# Patient Record
Sex: FEMALE | Race: WHITE | NOT HISPANIC OR LATINO | Employment: OTHER | URBAN - METROPOLITAN AREA
[De-identification: names, ages, dates, MRNs, and addresses within clinical notes are randomized per-mention and may not be internally consistent; named-entity substitution may affect disease eponyms.]

---

## 2017-01-24 DIAGNOSIS — E78.5 DYSLIPIDEMIA: ICD-10-CM

## 2017-01-24 DIAGNOSIS — I10 ESSENTIAL HYPERTENSION: ICD-10-CM

## 2017-01-24 RX ORDER — CLOPIDOGREL BISULFATE 75 MG/1
75 TABLET ORAL DAILY
Qty: 90 TABLET | Refills: 4 | Status: SHIPPED | OUTPATIENT
Start: 2017-01-24 | End: 2018-02-05 | Stop reason: SDUPTHER

## 2017-01-24 RX ORDER — AMLODIPINE BESYLATE 10 MG/1
10 TABLET ORAL DAILY
Qty: 90 TABLET | Refills: 4 | Status: SHIPPED | OUTPATIENT
Start: 2017-01-24 | End: 2017-03-10

## 2017-03-07 DIAGNOSIS — I73.9 PAD (PERIPHERAL ARTERY DISEASE): Primary | ICD-10-CM

## 2017-03-10 ENCOUNTER — OFFICE VISIT (OUTPATIENT)
Dept: CARDIOLOGY | Facility: CLINIC | Age: 82
End: 2017-03-10
Payer: MEDICARE

## 2017-03-10 ENCOUNTER — CLINICAL SUPPORT (OUTPATIENT)
Dept: CARDIOLOGY | Facility: CLINIC | Age: 82
End: 2017-03-10
Payer: MEDICARE

## 2017-03-10 VITALS
OXYGEN SATURATION: 94 % | HEIGHT: 63 IN | DIASTOLIC BLOOD PRESSURE: 80 MMHG | WEIGHT: 161.81 LBS | HEART RATE: 58 BPM | SYSTOLIC BLOOD PRESSURE: 142 MMHG | BODY MASS INDEX: 28.67 KG/M2

## 2017-03-10 DIAGNOSIS — I70.219 ATHEROSCLEROTIC PERIPHERAL VASCULAR DISEASE WITH INTERMITTENT CLAUDICATION: ICD-10-CM

## 2017-03-10 DIAGNOSIS — I25.10 CORONARY ARTERY DISEASE INVOLVING NATIVE CORONARY ARTERY OF NATIVE HEART WITHOUT ANGINA PECTORIS: Primary | ICD-10-CM

## 2017-03-10 DIAGNOSIS — I27.0 PRIMARY PULMONARY HYPERTENSION: ICD-10-CM

## 2017-03-10 DIAGNOSIS — N18.30 CKD (CHRONIC KIDNEY DISEASE) STAGE 3, GFR 30-59 ML/MIN: ICD-10-CM

## 2017-03-10 DIAGNOSIS — E78.5 DYSLIPIDEMIA: ICD-10-CM

## 2017-03-10 DIAGNOSIS — I73.9 PAD (PERIPHERAL ARTERY DISEASE): ICD-10-CM

## 2017-03-10 DIAGNOSIS — I10 ESSENTIAL HYPERTENSION: ICD-10-CM

## 2017-03-10 DIAGNOSIS — I21.4 NSTEMI (NON-ST ELEVATED MYOCARDIAL INFARCTION): ICD-10-CM

## 2017-03-10 DIAGNOSIS — I38 HEART VALVE DISEASE: ICD-10-CM

## 2017-03-10 DIAGNOSIS — I25.2 OLD MI (MYOCARDIAL INFARCTION): ICD-10-CM

## 2017-03-10 DIAGNOSIS — E78.5 DYSLIPIDEMIA: Primary | ICD-10-CM

## 2017-03-10 PROCEDURE — 99499 UNLISTED E&M SERVICE: CPT | Mod: S$GLB,,, | Performed by: INTERNAL MEDICINE

## 2017-03-10 PROCEDURE — 93926 LOWER EXTREMITY STUDY: CPT | Mod: S$GLB,,, | Performed by: INTERNAL MEDICINE

## 2017-03-10 PROCEDURE — 99999 PR PBB SHADOW E&M-EST. PATIENT-LVL IV: CPT | Mod: PBBFAC,,, | Performed by: INTERNAL MEDICINE

## 2017-03-10 PROCEDURE — 1126F AMNT PAIN NOTED NONE PRSNT: CPT | Mod: S$GLB,,, | Performed by: INTERNAL MEDICINE

## 2017-03-10 PROCEDURE — 99213 OFFICE O/P EST LOW 20 MIN: CPT | Mod: S$GLB,,, | Performed by: INTERNAL MEDICINE

## 2017-03-10 PROCEDURE — 1157F ADVNC CARE PLAN IN RCRD: CPT | Mod: S$GLB,,, | Performed by: INTERNAL MEDICINE

## 2017-03-10 PROCEDURE — 1160F RVW MEDS BY RX/DR IN RCRD: CPT | Mod: S$GLB,,, | Performed by: INTERNAL MEDICINE

## 2017-03-10 PROCEDURE — 1159F MED LIST DOCD IN RCRD: CPT | Mod: S$GLB,,, | Performed by: INTERNAL MEDICINE

## 2017-03-10 RX ORDER — ATORVASTATIN CALCIUM 20 MG/1
20 TABLET, FILM COATED ORAL DAILY
COMMUNITY
End: 2017-03-10 | Stop reason: CLARIF

## 2017-03-10 RX ORDER — CAPTOPRIL 50 MG/1
50 TABLET ORAL DAILY
COMMUNITY
End: 2018-04-06

## 2017-03-10 RX ORDER — ATORVASTATIN CALCIUM 20 MG/1
80 TABLET, FILM COATED ORAL DAILY
COMMUNITY
End: 2017-03-10 | Stop reason: SDUPTHER

## 2017-03-10 RX ORDER — CARVEDILOL 12.5 MG/1
12.5 TABLET ORAL 2 TIMES DAILY WITH MEALS
COMMUNITY
End: 2017-07-07 | Stop reason: DRUGHIGH

## 2017-03-10 RX ORDER — SPIRONOLACTONE 25 MG/1
25 TABLET ORAL DAILY
COMMUNITY
End: 2017-11-10

## 2017-03-10 RX ORDER — ATORVASTATIN CALCIUM 20 MG/1
80 TABLET, FILM COATED ORAL DAILY
Qty: 360 TABLET | Refills: 4 | Status: SHIPPED | OUTPATIENT
Start: 2017-03-10

## 2017-03-10 RX ORDER — ESCITALOPRAM OXALATE 10 MG/1
10 TABLET ORAL DAILY
COMMUNITY
End: 2017-06-21 | Stop reason: SDUPTHER

## 2017-03-10 NOTE — PROGRESS NOTES
Subjective:   Patient ID:  Symone Lloyd is a 87 y.o. female who presents for follow up of Coronary Artery Disease (s/p stents); Peripheral Arterial Disease (s/p L. SFA DCB); Chronic Kidney Disease; Hyperlipidemia; and Hypertension      Assessment:     1. Coronary artery disease involving native coronary artery of native heart without angina pectoris: no angina or chf symptoms.    2. Atherosclerotic peripheral vascular disease with intermittent claudication : : s/p PTA-DCB to L.SFA : nl LAURIE today with patent SFA.   3. Dyslipidemia : not taking statins.   4. CKD (chronic kidney disease) stage 3, GFR 30-59 ml/min    5. Essential hypertension : controlled at home.   6. Primary pulmonary hypertension    7. 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 NSTEMI (non-ST elevated myocardial infarction)    9. Old MI (myocardial infarction)        Plan:     Restart Lipitor: 20mg qd x 2 months, 40 mg qd x 2 months, then 80 mg qd x 2 months and recheck FLP.  Psychology consult for grief management.  RTC 6 months.          HPI: Reports no angina or chf symptoms.  No claudication symptoms.  Her  of many years passed away in December and she is grieving.  Not taking statins.           Review of Systems   Constitution: Negative for diaphoresis.   Cardiovascular: Negative for chest pain, claudication, cyanosis, dyspnea on exertion, irregular heartbeat, leg swelling, near-syncope, orthopnea, palpitations, paroxysmal nocturnal dyspnea and syncope.   Respiratory: Negative for shortness of breath and wheezing.    Neurological: Negative for brief paralysis, dizziness and focal weakness.   Psychiatric/Behavioral: Positive for depression.   All other systems reviewed and are negative.      Past Medical History:   Diagnosis Date    Aortic valve disorders     Arthritis     Benign neoplasm of breast     Chest pain, unspecified     CHF (congestive heart failure)     Coronary artery disease     Diaphragmatic hernia without  mention of obstruction or gangrene     Duodenal ulcer, unspecified as acute or chronic, without hemorrhage, perforation, or obstruction     Gastric ulcer, unspecified as acute or chronic, without mention of hemorrhage, perforation, or obstruction     GERD (gastroesophageal reflux disease)     Heart attack     HLD (hyperlipidemia)     HTN (hypertension)     Memory loss     Microscopic hematuria     Old myocardial infarction     Osteoporosis, unspecified     Personal history of colonic polyps     Primary pulmonary hypertension     Rosacea     Stricture and stenosis of esophagus     Unspecified hypothyroidism     Volvulus        Past Surgical History:   Procedure Laterality Date    ANGIOPLASTY      APPENDECTOMY      Open    CATARACT EXTRACTION, BILATERAL      COLONOSCOPY W/ POLYPECTOMY      CORONARY ANGIOPLASTY      s/p stents    ESOPHAGOGASTRODUODENOSCOPY      TONSILLECTOMY, ADENOIDECTOMY      TOTAL ABDOMINAL HYSTERECTOMY      rso, endometriosis       Social History   Substance Use Topics    Smoking status: Never Smoker    Smokeless tobacco: Never Used    Alcohol use 0.6 oz/week     1 Glasses of wine per week      Comment: wine - nightly       Family History   Problem Relation Age of Onset    Heart failure Mother      d. 81    Heart attack Father      d. 76    Hypertension Father     Heart failure Father     Heart disease Father     Cancer Sister      breast    No Known Problems Maternal Grandmother     No Known Problems Maternal Grandfather     No Known Problems Paternal Grandmother     No Known Problems Paternal Grandfather     No Known Problems Brother     No Known Problems Maternal Aunt     No Known Problems Maternal Uncle     No Known Problems Paternal Aunt     No Known Problems Paternal Uncle     Anemia Neg Hx     Arrhythmia Neg Hx     Asthma Neg Hx     Clotting disorder Neg Hx     Fainting Neg Hx     Hyperlipidemia Neg Hx     Melanoma Neg Hx     Colon cancer Neg  "Hx     Cirrhosis Neg Hx     Celiac disease Neg Hx     Esophageal cancer Neg Hx     Stomach cancer Neg Hx     Ulcerative colitis Neg Hx     Crohn's disease Neg Hx     Irritable bowel syndrome Neg Hx     Liver cancer Neg Hx     Rectal cancer Neg Hx        Current Outpatient Prescriptions   Medication Sig    acetaminophen (TYLENOL) 80 MG Chew Take 500 mg by mouth as needed.    aspirin (ECOTRIN) 81 MG EC tablet Take 81 mg by mouth once daily.      captopril (CAPOTEN) 50 MG tablet Take 50 mg by mouth once daily.    carvedilol (COREG) 12.5 MG tablet Take 12.5 mg by mouth 2 (two) times daily with meals.    clopidogrel (PLAVIX) 75 mg tablet Take 1 tablet (75 mg total) by mouth once daily.    escitalopram oxalate (LEXAPRO) 10 MG tablet Take 10 mg by mouth once daily.    estrogens, conjugated, (PREMARIN) 0.3 MG tablet Take 1 tablet (0.3 mg total) by mouth every evening.    levothyroxine (SYNTHROID) 100 MCG tablet TAKE 1 TABLET BY MOUTH EVERY DAY    omeprazole (PRILOSEC) 20 MG capsule Take 1 capsule (20 mg total) by mouth 2 (two) times daily.    spironolactone (ALDACTONE) 25 MG tablet Take 25 mg by mouth once daily.    atorvastatin (LIPITOR) 20 MG tablet Take 4 tablets (80 mg total) by mouth once daily.    nitroGLYCERIN (NITROSTAT) 0.4 MG SL tablet Place 1 tablet (0.4 mg total) under the tongue every 5 (five) minutes as needed for Chest pain.     No current facility-administered medications for this visit.        Review of patient's allergies indicates:   Allergen Reactions    Sulfa (sulfonamide antibiotics) Anaphylaxis       Objective:     BP (!) 142/80 (BP Location: Left arm, Patient Position: Sitting, BP Method: Manual)  Pulse (!) 58  Ht 5' 3" (1.6 m)  Wt 73.4 kg (161 lb 13.1 oz)  SpO2 (!) 94%  BMI 28.66 kg/m2    Physical Exam   Constitutional: She is oriented to person, place, and time. She appears well-developed and well-nourished.   HENT:   Head: Normocephalic and atraumatic.   Eyes: Pupils are " equal, round, and reactive to light.   Neck: Normal range of motion. Neck supple. No thyromegaly present.   Cardiovascular: Normal rate, regular rhythm, S1 normal, S2 normal and intact distal pulses.    Murmur heard.   Early systolic murmur is present with a grade of 2/6   Pulses:       Carotid pulses are 2+ on the right side, and 2+ on the left side.       Radial pulses are 2+ on the right side, and 2+ on the left side.        Femoral pulses are 2+ on the right side, and 2+ on the left side.       Popliteal pulses are 2+ on the right side, and 2+ on the left side.        Dorsalis pedis pulses are 2+ on the right side, and 2+ on the left side.        Posterior tibial pulses are 2+ on the right side, and 2+ on the left side.   Pulmonary/Chest: Effort normal and breath sounds normal. No respiratory distress. She has no wheezes. She has no rales.   Abdominal: Soft. Bowel sounds are normal. She exhibits no distension. There is no hepatosplenomegaly. There is no tenderness.   Musculoskeletal: Normal range of motion. She exhibits no edema or tenderness.   Neurological: She is alert and oriented to person, place, and time. No cranial nerve deficit.   Skin: Skin is warm.   Psychiatric: She has a normal mood and affect. Her behavior is normal. Judgment and thought content normal.   Nursing note and vitals reviewed.        Chemistry        Component Value Date/Time     03/10/2017 0845    K 3.9 03/10/2017 0845     03/10/2017 0845    CO2 26 03/10/2017 0845    BUN 23 03/10/2017 0845    CREATININE 1.0 03/10/2017 0845    GLU 88 03/10/2017 0845        Component Value Date/Time    CALCIUM 9.2 03/10/2017 0845    ALKPHOS 78 12/15/2015 1538    AST 20 12/15/2015 1538    ALT 17 12/15/2015 1538    BILITOT 0.3 12/15/2015 1538            Lab Results   Component Value Date    CHOL 272 (H) 03/10/2017    CHOL 245 (H) 08/06/2015    CHOL 267 (H) 01/12/2015     Lab Results   Component Value Date    HDL 57 03/10/2017    HDL 59  08/06/2015    HDL 53 01/12/2015     Lab Results   Component Value Date    LDLCALC 194.0 (H) 03/10/2017    LDLCALC 158.4 08/06/2015    LDLCALC 185.8 (H) 01/12/2015     Lab Results   Component Value Date    TRIG 105 03/10/2017    TRIG 138 08/06/2015    TRIG 141 01/12/2015     Lab Results   Component Value Date    CHOLHDL 21.0 03/10/2017    CHOLHDL 24.1 08/06/2015    CHOLHDL 19.9 (L) 01/12/2015         Lab Results   Component Value Date     03/10/2017    K 3.9 03/10/2017     03/10/2017    CO2 26 03/10/2017    BUN 23 03/10/2017    CREATININE 1.0 03/10/2017    GLU 88 03/10/2017    HGBA1C 5.6 08/07/2015    MG 1.9 10/26/2015    AST 20 12/15/2015    ALT 17 12/15/2015    ALBUMIN 3.5 12/15/2015    PROT 6.7 12/15/2015    BILITOT 0.3 12/15/2015    WBC 8.59 12/15/2015    HGB 10.1 (L) 12/15/2015    HCT 32.6 (L) 12/15/2015    MCV 79 (L) 12/15/2015     (H) 12/15/2015    INR 0.9 10/09/2015    TSH 2.611 11/09/2015    CHOL 272 (H) 03/10/2017    HDL 57 03/10/2017    LDLCALC 194.0 (H) 03/10/2017    TRIG 105 03/10/2017

## 2017-03-13 ENCOUNTER — TELEPHONE (OUTPATIENT)
Dept: PSYCHIATRY | Facility: CLINIC | Age: 82
End: 2017-03-13

## 2017-06-07 ENCOUNTER — HOSPITAL ENCOUNTER (EMERGENCY)
Facility: HOSPITAL | Age: 82
Discharge: HOME | End: 2017-06-07
Attending: EMERGENCY MEDICINE
Payer: MEDICARE

## 2017-06-07 VITALS
BODY MASS INDEX: 28.35 KG/M2 | OXYGEN SATURATION: 95 % | HEIGHT: 63 IN | RESPIRATION RATE: 16 BRPM | TEMPERATURE: 98 F | DIASTOLIC BLOOD PRESSURE: 67 MMHG | SYSTOLIC BLOOD PRESSURE: 149 MMHG | HEART RATE: 53 BPM | WEIGHT: 160 LBS

## 2017-06-07 DIAGNOSIS — H53.2 DIPLOPIA: ICD-10-CM

## 2017-06-07 DIAGNOSIS — M54.2 CERVICALGIA OF OCCIPITO-ATLANTO-AXIAL REGION: Primary | ICD-10-CM

## 2017-06-07 DIAGNOSIS — R42 DIZZINESS: ICD-10-CM

## 2017-06-07 LAB
BUN SERPL-MCNC: 28 MG/DL (ref 6–30)
CHLORIDE SERPL-SCNC: 105 MMOL/L (ref 95–110)
CREAT SERPL-MCNC: 0.9 MG/DL (ref 0.5–1.4)
GLUCOSE SERPL-MCNC: 106 MG/DL (ref 70–110)
HCT VFR BLD CALC: 40 %PCV (ref 36–54)
POC IONIZED CALCIUM: 1.16 MMOL/L (ref 1.06–1.42)
POC TCO2 (MEASURED): 25 MMOL/L (ref 23–29)
POTASSIUM BLD-SCNC: 3.7 MMOL/L (ref 3.5–5.1)
SAMPLE: NORMAL
SODIUM BLD-SCNC: 140 MMOL/L (ref 136–145)

## 2017-06-07 PROCEDURE — 99284 EMERGENCY DEPT VISIT MOD MDM: CPT | Mod: 25

## 2017-06-07 PROCEDURE — 93010 ELECTROCARDIOGRAM REPORT: CPT | Mod: S$GLB,,, | Performed by: INTERNAL MEDICINE

## 2017-06-07 PROCEDURE — 25000003 PHARM REV CODE 250: Performed by: EMERGENCY MEDICINE

## 2017-06-07 PROCEDURE — 93005 ELECTROCARDIOGRAM TRACING: CPT

## 2017-06-07 PROCEDURE — 99285 EMERGENCY DEPT VISIT HI MDM: CPT | Mod: ,,, | Performed by: EMERGENCY MEDICINE

## 2017-06-07 RX ORDER — ACETAMINOPHEN 500 MG
1000 TABLET ORAL
Status: COMPLETED | OUTPATIENT
Start: 2017-06-07 | End: 2017-06-07

## 2017-06-07 RX ORDER — PANTOPRAZOLE SODIUM 40 MG/1
40 TABLET, DELAYED RELEASE ORAL
Status: COMPLETED | OUTPATIENT
Start: 2017-06-07 | End: 2017-06-07

## 2017-06-07 RX ADMIN — ACETAMINOPHEN 1000 MG: 500 TABLET ORAL at 02:06

## 2017-06-07 RX ADMIN — PANTOPRAZOLE SODIUM 40 MG: 40 TABLET, DELAYED RELEASE ORAL at 02:06

## 2017-06-07 NOTE — ED NOTES
MRI called stating pt is scheduled to go for test at 6pm - pt informed - pt and family member requests to have MRI outpatient another day - Dr. Cash notified and states he will come talk to pt and family.

## 2017-06-07 NOTE — ED NOTES
Two patient identifiers checked and confirmed.    APPEARANCE: Resting comfortably in no acute distress. Patient has clean hair, skin and nails. Clothing is appropriate and properly fastened.  NEURO: Awake, alert, appropriate for age, and cooperative with a calm affect; pupils equal and round.  HEENT: Patient reports double vision and neck stiffness.   CARDIAC: Regular rate and rhythm. Trace edema noted to bilateral lower extremities.   RESPIRATORY: Airway is open and patent. Respirations are spontaneous on room air. Normal respiratory effort and rate noted.  GI/: Abdomen soft and non-distended. Patient is reported to void and stool appropriately for age.  NEUROVASCULAR: All extremities are warm and pink with +2 pulses and capillary refill less than 3 seconds.  MUSCULOSKELETAL: Moves all extremities well; no obvious deformities noted.  SKIN: Warm and dry, adequate turgor, mucus membranes moist and pink

## 2017-06-07 NOTE — ED TRIAGE NOTES
Patient presents to ER with complaints of double vision and neck pain that has been persistent for several months.

## 2017-06-07 NOTE — ED PROVIDER NOTES
Encounter Date: 6/7/2017    SCRIBE #1 NOTE: I, Sybil Yu, am scribing for, and in the presence of, Dr. Cash.       History     Chief Complaint   Patient presents with    Headache     neck pain x several days, radiates into head/head pain-headache began last night, dizziness (intermittent) today, denies chest pain, denies shortness of breath, alert and oriented x 3, denies weakness    Dizziness     Review of patient's allergies indicates:   Allergen Reactions    Sulfa (sulfonamide antibiotics) Anaphylaxis     Time seen by provider: 2:15 PM    This is a 87 y.o. female with a PMHx of pulmonary HTN, CAD, CHF, and MI and a PSHx of bilateral cataract extraction and angioplasty who presents with complaint of intermittent posterior neck pain radiating to the scalp, as well as diplopia when looking laterally x 1 month. Associated symptoms include neck stiffness with movement in the lateral direction. She denies difficulty ambulating, weakness in arms/hands, and fever. Symptoms have not worsened. Patient's son explains he was concerned so brought her in.       The history is provided by the patient, a relative and medical records.     Past Medical History:   Diagnosis Date    Aortic valve disorders     Arthritis     Benign neoplasm of breast     Chest pain, unspecified     CHF (congestive heart failure)     Coronary artery disease     Diaphragmatic hernia without mention of obstruction or gangrene     Duodenal ulcer, unspecified as acute or chronic, without hemorrhage, perforation, or obstruction     Gastric ulcer, unspecified as acute or chronic, without mention of hemorrhage, perforation, or obstruction     GERD (gastroesophageal reflux disease)     Heart attack     HLD (hyperlipidemia)     HTN (hypertension)     Memory loss     Microscopic hematuria     Old myocardial infarction     Osteoporosis, unspecified     Personal history of colonic polyps     Primary pulmonary hypertension     Rosacea      Stricture and stenosis of esophagus     Unspecified hypothyroidism     Volvulus      Past Surgical History:   Procedure Laterality Date    ANGIOPLASTY      APPENDECTOMY      Open    CATARACT EXTRACTION, BILATERAL      COLONOSCOPY W/ POLYPECTOMY      CORONARY ANGIOPLASTY      s/p stents    ESOPHAGOGASTRODUODENOSCOPY      TONSILLECTOMY, ADENOIDECTOMY      TOTAL ABDOMINAL HYSTERECTOMY      rso, endometriosis     Family History   Problem Relation Age of Onset    Heart failure Mother      d. 81    Heart attack Father      d. 76    Hypertension Father     Heart failure Father     Heart disease Father     Cancer Sister      breast    No Known Problems Maternal Grandmother     No Known Problems Maternal Grandfather     No Known Problems Paternal Grandmother     No Known Problems Paternal Grandfather     No Known Problems Brother     No Known Problems Maternal Aunt     No Known Problems Maternal Uncle     No Known Problems Paternal Aunt     No Known Problems Paternal Uncle     Anemia Neg Hx     Arrhythmia Neg Hx     Asthma Neg Hx     Clotting disorder Neg Hx     Fainting Neg Hx     Hyperlipidemia Neg Hx     Melanoma Neg Hx     Colon cancer Neg Hx     Cirrhosis Neg Hx     Celiac disease Neg Hx     Esophageal cancer Neg Hx     Stomach cancer Neg Hx     Ulcerative colitis Neg Hx     Crohn's disease Neg Hx     Irritable bowel syndrome Neg Hx     Liver cancer Neg Hx     Rectal cancer Neg Hx      Social History   Substance Use Topics    Smoking status: Never Smoker    Smokeless tobacco: Never Used    Alcohol use 0.6 oz/week     1 Glasses of wine per week      Comment: wine - nightly     Review of Systems   Constitutional: Negative for fever.   HENT: Negative for nosebleeds.    Eyes: Positive for visual disturbance (diplopia).   Respiratory: Negative for shortness of breath.    Cardiovascular: Negative for chest pain.   Gastrointestinal: Negative for vomiting.   Genitourinary:  Negative for hematuria.   Musculoskeletal: Positive for neck pain and neck stiffness.   Skin: Negative for rash.   Neurological: Negative for speech difficulty and weakness.       Physical Exam     Initial Vitals [06/07/17 1328]   BP Pulse Resp Temp SpO2   (!) 128/98 (!) 56 17 98.2 °F (36.8 °C) 98 %     Physical Exam    Nursing note and vitals reviewed.  Constitutional: She appears well-developed and well-nourished. No distress.   HENT:   Head: Normocephalic and atraumatic.   Mouth/Throat: Oropharynx is clear and moist.   Eyes:   Diplopia with leftward gaze   Neck:   Bilateral occipital and upper cervical muscular tenderness. No bony tenderness. Pain when I move her head passively to the left. No pain with flexion and extension of the neck.   Cardiovascular: Normal rate, regular rhythm and normal heart sounds.   No murmur heard.  Pulmonary/Chest: Breath sounds normal. She has no wheezes. She has no rhonchi. She has no rales.   Neurological: She is alert and oriented to person, place, and time. She has normal strength. Coordination normal.   Skin: Skin is warm and dry.         ED Course   Procedures  Labs Reviewed   ISTAT PROCEDURE     EKG Readings: (Independently Interpreted)   Sinus bradycardia with PAC's. No acute findings. Similar to 2015          Medical Decision Making:   History:   Old Medical Records: I decided to obtain old medical records.  Initial Assessment:   Patient is a 87 year old female with neck pain with history of same, going on for a few weeks. She describes also some dizziness, by which she means looking to the left and seeing two things. She denies spinning sensation or feeling unsteady. On exam, there is diplopia with leftward gaze. I am concerned for possible small stroke near the 6th nerve nucleus. I will MRI the brain to definitively answer.  Independently Interpreted Test(s):   I have ordered and independently interpreted EKG Reading(s) - see prior notes  Clinical Tests:   Lab Tests:  Ordered and Reviewed  Medical Tests: Ordered and Reviewed  ED Management:  Updates:  4:53 PM - Patient does not want to wait for her MRI. Diplopia has been present at least a month and has not worsened, so if it is a small stroke, no emergent/urgent intervention is required. She can follow this with her PCP. Her neck pain can be treated with OTC medication. Patient is discharged.            Scribe Attestation:   Scribe #1: I performed the above scribed service and the documentation accurately describes the services I performed. I attest to the accuracy of the note.    Attending Attestation:           Physician Attestation for Scribe:  Physician Attestation Statement for Scribe #1: I, Dr. Cash, reviewed documentation, as scribed by Sybil Yu in my presence, and it is both accurate and complete.                 ED Course     Clinical Impression:   The primary encounter diagnosis was Cervicalgia of [id number] region. Diagnoses of Dizziness and Diplopia were also pertinent to this visit.    Disposition:   Disposition: Discharged  Condition: Stable       Juan Jose Cash MD  06/11/17 [id number]

## 2017-06-07 NOTE — PROVIDER PROGRESS NOTES - EMERGENCY DEPT.
Encounter Date: 6/7/2017    ED Physician Progress Notes       SCRIBE NOTE: I, Karen Montes, am scribing for, and in the presence of,  Dr. Olvera .  Physician Statement: I, Dr. Olvera , personally performed the services described in this documentation as scribed by Karen Montes in my presence, and it is both accurate and complete.      EKG - STEMI Decision  Initial Reading: No STEMI present.

## 2017-06-21 ENCOUNTER — OFFICE VISIT (OUTPATIENT)
Dept: PSYCHIATRY | Facility: CLINIC | Age: 82
End: 2017-06-21
Payer: COMMERCIAL

## 2017-06-21 VITALS
SYSTOLIC BLOOD PRESSURE: 182 MMHG | HEIGHT: 63 IN | WEIGHT: 161 LBS | DIASTOLIC BLOOD PRESSURE: 85 MMHG | BODY MASS INDEX: 28.53 KG/M2 | HEART RATE: 63 BPM

## 2017-06-21 DIAGNOSIS — F43.21 GRIEF: ICD-10-CM

## 2017-06-21 DIAGNOSIS — F33.2 MAJOR DEPRESSIVE DISORDER, RECURRENT, SEVERE WITHOUT PSYCHOTIC FEATURES: Primary | ICD-10-CM

## 2017-06-21 PROCEDURE — 1157F ADVNC CARE PLAN IN RCRD: CPT | Mod: S$GLB,,, | Performed by: PSYCHIATRY & NEUROLOGY

## 2017-06-21 PROCEDURE — 1159F MED LIST DOCD IN RCRD: CPT | Mod: S$GLB,,, | Performed by: PSYCHIATRY & NEUROLOGY

## 2017-06-21 PROCEDURE — 99205 OFFICE O/P NEW HI 60 MIN: CPT | Mod: S$GLB,,, | Performed by: PSYCHIATRY & NEUROLOGY

## 2017-06-21 PROCEDURE — 99999 PR PBB SHADOW E&M-EST. PATIENT-LVL III: CPT | Mod: PBBFAC,,, | Performed by: PSYCHIATRY & NEUROLOGY

## 2017-06-21 PROCEDURE — 99499 UNLISTED E&M SERVICE: CPT | Mod: S$GLB,,, | Performed by: PSYCHIATRY & NEUROLOGY

## 2017-06-21 RX ORDER — ESCITALOPRAM OXALATE 10 MG/1
10 TABLET ORAL DAILY
Qty: 90 TABLET | Refills: 0 | Status: SHIPPED | OUTPATIENT
Start: 2017-06-21 | End: 2017-08-07 | Stop reason: SDUPTHER

## 2017-07-07 ENCOUNTER — LAB VISIT (OUTPATIENT)
Dept: LAB | Facility: HOSPITAL | Age: 82
End: 2017-07-07
Attending: INTERNAL MEDICINE
Payer: MEDICARE

## 2017-07-07 ENCOUNTER — OFFICE VISIT (OUTPATIENT)
Dept: CARDIOLOGY | Facility: CLINIC | Age: 82
End: 2017-07-07
Payer: MEDICARE

## 2017-07-07 VITALS
SYSTOLIC BLOOD PRESSURE: 158 MMHG | HEART RATE: 61 BPM | BODY MASS INDEX: 26.8 KG/M2 | WEIGHT: 151.25 LBS | OXYGEN SATURATION: 93 % | DIASTOLIC BLOOD PRESSURE: 60 MMHG | HEIGHT: 63 IN

## 2017-07-07 DIAGNOSIS — I10 ESSENTIAL HYPERTENSION: Primary | ICD-10-CM

## 2017-07-07 DIAGNOSIS — I27.0 PRIMARY PULMONARY HYPERTENSION: ICD-10-CM

## 2017-07-07 DIAGNOSIS — E78.5 DYSLIPIDEMIA: ICD-10-CM

## 2017-07-07 DIAGNOSIS — E78.5 DYSLIPIDEMIA: Primary | ICD-10-CM

## 2017-07-07 DIAGNOSIS — I70.219 ATHEROSCLEROTIC PERIPHERAL VASCULAR DISEASE WITH INTERMITTENT CLAUDICATION: ICD-10-CM

## 2017-07-07 DIAGNOSIS — I25.10 CORONARY ARTERY DISEASE INVOLVING NATIVE CORONARY ARTERY OF NATIVE HEART WITHOUT ANGINA PECTORIS: Primary | ICD-10-CM

## 2017-07-07 DIAGNOSIS — F32.A DEPRESSION, UNSPECIFIED DEPRESSION TYPE: ICD-10-CM

## 2017-07-07 DIAGNOSIS — G31.84 MCI (MILD COGNITIVE IMPAIRMENT): ICD-10-CM

## 2017-07-07 DIAGNOSIS — N18.30 CKD (CHRONIC KIDNEY DISEASE) STAGE 3, GFR 30-59 ML/MIN: ICD-10-CM

## 2017-07-07 DIAGNOSIS — I10 ESSENTIAL HYPERTENSION: ICD-10-CM

## 2017-07-07 DIAGNOSIS — I38 HEART VALVE DISEASE: ICD-10-CM

## 2017-07-07 LAB
ANION GAP SERPL CALC-SCNC: 10 MMOL/L
BUN SERPL-MCNC: 30 MG/DL
CALCIUM SERPL-MCNC: 9.5 MG/DL
CHLORIDE SERPL-SCNC: 108 MMOL/L
CHOLEST/HDLC SERPL: 3.3 {RATIO}
CO2 SERPL-SCNC: 23 MMOL/L
CREAT SERPL-MCNC: 1 MG/DL
EST. GFR  (AFRICAN AMERICAN): 58.1 ML/MIN/1.73 M^2
EST. GFR  (NON AFRICAN AMERICAN): 50.4 ML/MIN/1.73 M^2
GLUCOSE SERPL-MCNC: 99 MG/DL
HDL/CHOLESTEROL RATIO: 30.7 %
HDLC SERPL-MCNC: 192 MG/DL
HDLC SERPL-MCNC: 59 MG/DL
LDLC SERPL CALC-MCNC: 110 MG/DL
NONHDLC SERPL-MCNC: 133 MG/DL
POTASSIUM SERPL-SCNC: 4.4 MMOL/L
SODIUM SERPL-SCNC: 141 MMOL/L
TRIGL SERPL-MCNC: 115 MG/DL

## 2017-07-07 PROCEDURE — 1159F MED LIST DOCD IN RCRD: CPT | Mod: S$GLB,,, | Performed by: INTERNAL MEDICINE

## 2017-07-07 PROCEDURE — 99213 OFFICE O/P EST LOW 20 MIN: CPT | Mod: S$GLB,,, | Performed by: INTERNAL MEDICINE

## 2017-07-07 PROCEDURE — 1157F ADVNC CARE PLAN IN RCRD: CPT | Mod: S$GLB,,, | Performed by: INTERNAL MEDICINE

## 2017-07-07 PROCEDURE — 99999 PR PBB SHADOW E&M-EST. PATIENT-LVL V: CPT | Mod: PBBFAC,,, | Performed by: INTERNAL MEDICINE

## 2017-07-07 PROCEDURE — 1126F AMNT PAIN NOTED NONE PRSNT: CPT | Mod: S$GLB,,, | Performed by: INTERNAL MEDICINE

## 2017-07-07 PROCEDURE — 99499 UNLISTED E&M SERVICE: CPT | Mod: S$GLB,,, | Performed by: INTERNAL MEDICINE

## 2017-07-07 RX ORDER — CARVEDILOL 25 MG/1
25 TABLET ORAL 2 TIMES DAILY WITH MEALS
Status: ON HOLD | COMMUNITY
End: 2018-02-25

## 2017-07-07 RX ORDER — CARVEDILOL 25 MG/1
25 TABLET ORAL DAILY
COMMUNITY
End: 2017-07-07 | Stop reason: SDUPTHER

## 2017-07-07 RX ORDER — CARVEDILOL 25 MG/1
25 TABLET ORAL DAILY
Qty: 90 TABLET | Refills: 4 | Status: SHIPPED | OUTPATIENT
Start: 2017-07-07 | End: 2017-07-07 | Stop reason: ALTCHOICE

## 2017-07-07 NOTE — PROGRESS NOTES
Subjective:   Patient ID:  Symone Lloyd is a 88 y.o. female who presents for follow up of Coronary Artery Disease; Hyperlipidemia; Hypertension; and Peripheral Arterial Disease      Assessment:     1. Coronary artery disease involving native coronary artery of native heart without angina pectoris : no angina   2. Depression, unspecified depression type    3. MCI (mild cognitive impairment)    4. Primary pulmonary hypertension    5. Atherosclerotic peripheral vascular disease with intermittent claudication : : s/p PTA-DCB to L.SFA    6. Essential hypertension: poor control    7. 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 CKD (chronic kidney disease) stage 3, GFR 30-59 ml/min    9. Dyslipidemia : marked improvement in LDL with statin       Plan:   Increase carvedilol to 25 mg bid for htn control  Discussed using SL NTG for severe htn.  Discussed increasing activity.  Continue diet and lipitor for LDL.  RTC 6 months with FLP        HPI: Has noted increase in BP episodically (> 200's).  She denies angina or heart failure and has no claudication.       Review of Systems   Constitution: Negative for diaphoresis.   Cardiovascular: Negative for chest pain, claudication, cyanosis, dyspnea on exertion, irregular heartbeat, leg swelling, near-syncope, orthopnea and palpitations.   Respiratory: Negative for shortness of breath and wheezing.    Psychiatric/Behavioral: Positive for depression.   All other systems reviewed and are negative.      Past Medical History:   Diagnosis Date    Aortic valve disorders     Arthritis     Benign neoplasm of breast     Chest pain, unspecified     CHF (congestive heart failure)     Coronary artery disease     Depression     Diaphragmatic hernia without mention of obstruction or gangrene     Duodenal ulcer, unspecified as acute or chronic, without hemorrhage, perforation, or obstruction     Gastric ulcer, unspecified as acute or chronic, without mention of hemorrhage,  perforation, or obstruction     GERD (gastroesophageal reflux disease)     Headache     Heart attack     History of migraine     HLD (hyperlipidemia)     HTN (hypertension)     Memory loss     Microscopic hematuria     Old myocardial infarction     Osteoporosis, unspecified     Personal history of colonic polyps     Primary pulmonary hypertension     Psychiatric problem     PTSD (post-traumatic stress disorder)     Rosacea     Stricture and stenosis of esophagus     Therapy     Unspecified hypothyroidism     Volvulus        Past Surgical History:   Procedure Laterality Date    ANGIOPLASTY      APPENDECTOMY      Open    CATARACT EXTRACTION, BILATERAL      COLONOSCOPY W/ POLYPECTOMY      CORONARY ANGIOPLASTY      s/p stents    ESOPHAGOGASTRODUODENOSCOPY      TONSILLECTOMY, ADENOIDECTOMY      TOTAL ABDOMINAL HYSTERECTOMY      rso, endometriosis       Social History   Substance Use Topics    Smoking status: Never Smoker    Smokeless tobacco: Never Used    Alcohol use 0.6 oz/week     1 Glasses of wine per week      Comment: wine - nightly       Family History   Problem Relation Age of Onset    Heart failure Mother      d. 81    Heart attack Father      d. 76    Hypertension Father     Heart failure Father     Heart disease Father     Cancer Sister      breast    No Known Problems Maternal Grandmother     No Known Problems Maternal Grandfather     No Known Problems Paternal Grandmother     No Known Problems Paternal Grandfather     No Known Problems Brother     No Known Problems Maternal Aunt     No Known Problems Maternal Uncle     No Known Problems Paternal Aunt     No Known Problems Paternal Uncle     Anemia Neg Hx     Arrhythmia Neg Hx     Asthma Neg Hx     Clotting disorder Neg Hx     Fainting Neg Hx     Hyperlipidemia Neg Hx     Melanoma Neg Hx     Colon cancer Neg Hx     Cirrhosis Neg Hx     Celiac disease Neg Hx     Esophageal cancer Neg Hx     Stomach  "cancer Neg Hx     Ulcerative colitis Neg Hx     Crohn's disease Neg Hx     Irritable bowel syndrome Neg Hx     Liver cancer Neg Hx     Rectal cancer Neg Hx     Dementia Neg Hx     Depression Neg Hx     Schizophrenia Neg Hx     Alcohol abuse Neg Hx     Drug abuse Neg Hx     Suicide Neg Hx        Current Outpatient Prescriptions   Medication Sig    atorvastatin (LIPITOR) 20 MG tablet Take 4 tablets (80 mg total) by mouth once daily.    captopril (CAPOTEN) 50 MG tablet Take 50 mg by mouth once daily.    clopidogrel (PLAVIX) 75 mg tablet Take 1 tablet (75 mg total) by mouth once daily.    escitalopram oxalate (LEXAPRO) 10 MG tablet Take 1 tablet (10 mg total) by mouth once daily.    estrogens, conjugated, (PREMARIN) 0.3 MG tablet Take 1 tablet (0.3 mg total) by mouth every evening.    folic acid-vit B6-vit B12 2.5-25-2 mg (FOLBIC OR EQUIV) 2.5-25-2 mg Tab Take 1 tablet by mouth once daily.    levothyroxine (SYNTHROID) 100 MCG tablet TAKE 1 TABLET BY MOUTH EVERY DAY    omeprazole (PRILOSEC) 20 MG capsule Take 1 capsule (20 mg total) by mouth 2 (two) times daily.    spironolactone (ALDACTONE) 25 MG tablet Take 25 mg by mouth once daily.    acetaminophen (TYLENOL) 80 MG Chew Take 500 mg by mouth as needed.    aspirin (ECOTRIN) 81 MG EC tablet Take 81 mg by mouth once daily.      carvedilol (COREG) 25 MG tablet Take 1 tablet (25 mg total) by mouth once daily at 6am.    nitroGLYCERIN (NITROSTAT) 0.4 MG SL tablet Place 1 tablet (0.4 mg total) under the tongue every 5 (five) minutes as needed for Chest pain.     No current facility-administered medications for this visit.        Review of patient's allergies indicates:   Allergen Reactions    Sulfa (sulfonamide antibiotics) Anaphylaxis       Objective:     BP (!) 158/60 (BP Location: Right arm, Patient Position: Sitting, BP Method: Automatic)   Pulse 61   Ht 5' 3" (1.6 m)   Wt 68.6 kg (151 lb 3.8 oz)   SpO2 (!) 93%   BMI 26.79 kg/m²     Physical " Exam   Constitutional: She is oriented to person, place, and time. She appears well-developed and well-nourished.   HENT:   Head: Normocephalic and atraumatic.   Eyes: Pupils are equal, round, and reactive to light.   Neck: Normal range of motion. Neck supple. No thyromegaly present.   Cardiovascular: Normal rate, regular rhythm, S1 normal, S2 normal, normal heart sounds and intact distal pulses.    No murmur heard.  Pulses:       Carotid pulses are 2+ on the right side, and 2+ on the left side.       Radial pulses are 2+ on the right side, and 2+ on the left side.        Femoral pulses are 2+ on the right side, and 2+ on the left side.       Popliteal pulses are 2+ on the right side, and 2+ on the left side.        Dorsalis pedis pulses are 2+ on the right side, and 2+ on the left side.        Posterior tibial pulses are 2+ on the right side, and 2+ on the left side.   Pulmonary/Chest: Effort normal and breath sounds normal. No respiratory distress. She has no wheezes. She has no rales.   Abdominal: Soft. Bowel sounds are normal. She exhibits no distension. There is no hepatosplenomegaly. There is no tenderness.   Musculoskeletal: Normal range of motion. She exhibits no edema or tenderness.   Neurological: She is alert and oriented to person, place, and time. No cranial nerve deficit.   Skin: Skin is warm.   Psychiatric: She has a normal mood and affect. Her behavior is normal. Judgment and thought content normal.   Nursing note and vitals reviewed.        Chemistry        Component Value Date/Time     07/07/2017 0753    K 4.4 07/07/2017 0753     07/07/2017 0753    CO2 23 07/07/2017 0753    BUN 30 (H) 07/07/2017 0753    CREATININE 1.0 07/07/2017 0753    GLU 99 07/07/2017 0753        Component Value Date/Time    CALCIUM 9.5 07/07/2017 0753    ALKPHOS 78 12/15/2015 1538    AST 20 12/15/2015 1538    ALT 17 12/15/2015 1538    BILITOT 0.3 12/15/2015 1538    ESTGFRAFRICA 58.1 (A) 07/07/2017 0753    EGFRNONAA  50.4 (A) 07/07/2017 0753            Lab Results   Component Value Date    CHOL 192 07/07/2017    CHOL 272 (H) 03/10/2017    CHOL 245 (H) 08/06/2015     Lab Results   Component Value Date    HDL 59 07/07/2017    HDL 57 03/10/2017    HDL 59 08/06/2015     Lab Results   Component Value Date    LDLCALC 110.0 07/07/2017    LDLCALC 194.0 (H) 03/10/2017    LDLCALC 158.4 08/06/2015     Lab Results   Component Value Date    TRIG 115 07/07/2017    TRIG 105 03/10/2017    TRIG 138 08/06/2015     Lab Results   Component Value Date    CHOLHDL 30.7 07/07/2017    CHOLHDL 21.0 03/10/2017    CHOLHDL 24.1 08/06/2015         Lab Results   Component Value Date     07/07/2017    K 4.4 07/07/2017     07/07/2017    CO2 23 07/07/2017    BUN 30 (H) 07/07/2017    CREATININE 1.0 07/07/2017    GLU 99 07/07/2017    HGBA1C 5.6 08/07/2015    MG 1.9 10/26/2015    AST 20 12/15/2015    ALT 17 12/15/2015    ALBUMIN 3.5 12/15/2015    PROT 6.7 12/15/2015    BILITOT 0.3 12/15/2015    WBC 8.59 12/15/2015    HGB 10.1 (L) 12/15/2015    HCT 40 06/07/2017    MCV 79 (L) 12/15/2015     (H) 12/15/2015    INR 0.9 10/09/2015    TSH 2.611 11/09/2015    CHOL 192 07/07/2017    HDL 59 07/07/2017    LDLCALC 110.0 07/07/2017    TRIG 115 07/07/2017

## 2017-08-07 ENCOUNTER — OFFICE VISIT (OUTPATIENT)
Dept: PSYCHIATRY | Facility: CLINIC | Age: 82
End: 2017-08-07
Payer: MEDICARE

## 2017-08-07 VITALS
DIASTOLIC BLOOD PRESSURE: 75 MMHG | HEART RATE: 65 BPM | SYSTOLIC BLOOD PRESSURE: 185 MMHG | BODY MASS INDEX: 28.53 KG/M2 | WEIGHT: 161 LBS | HEIGHT: 63 IN

## 2017-08-07 DIAGNOSIS — F33.41 MAJOR DEPRESSIVE DISORDER, RECURRENT EPISODE, IN PARTIAL REMISSION: ICD-10-CM

## 2017-08-07 DIAGNOSIS — F43.21 GRIEF: Primary | ICD-10-CM

## 2017-08-07 PROCEDURE — 99213 OFFICE O/P EST LOW 20 MIN: CPT | Mod: S$GLB,,, | Performed by: PSYCHIATRY & NEUROLOGY

## 2017-08-07 PROCEDURE — 1157F ADVNC CARE PLAN IN RCRD: CPT | Mod: S$GLB,,, | Performed by: PSYCHIATRY & NEUROLOGY

## 2017-08-07 PROCEDURE — 90833 PSYTX W PT W E/M 30 MIN: CPT | Mod: S$GLB,,, | Performed by: PSYCHIATRY & NEUROLOGY

## 2017-08-07 PROCEDURE — 99499 UNLISTED E&M SERVICE: CPT | Mod: S$GLB,,, | Performed by: PSYCHIATRY & NEUROLOGY

## 2017-08-07 PROCEDURE — 3008F BODY MASS INDEX DOCD: CPT | Mod: S$GLB,,, | Performed by: PSYCHIATRY & NEUROLOGY

## 2017-08-07 PROCEDURE — 1159F MED LIST DOCD IN RCRD: CPT | Mod: S$GLB,,, | Performed by: PSYCHIATRY & NEUROLOGY

## 2017-08-07 PROCEDURE — 99999 PR PBB SHADOW E&M-EST. PATIENT-LVL III: CPT | Mod: PBBFAC,,, | Performed by: PSYCHIATRY & NEUROLOGY

## 2017-08-07 RX ORDER — ESCITALOPRAM OXALATE 20 MG/1
20 TABLET ORAL DAILY
Qty: 90 TABLET | Refills: 1 | Status: SHIPPED | OUTPATIENT
Start: 2017-08-07 | End: 2017-11-10 | Stop reason: DRUGHIGH

## 2017-08-08 DIAGNOSIS — E03.9 HYPOTHYROIDISM: ICD-10-CM

## 2017-08-08 RX ORDER — LEVOTHYROXINE SODIUM 100 UG/1
100 TABLET ORAL DAILY
Qty: 90 TABLET | Refills: 0 | Status: SHIPPED | OUTPATIENT
Start: 2017-08-08 | End: 2017-10-30 | Stop reason: SDUPTHER
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
## 2017-08-21 ENCOUNTER — TELEPHONE (OUTPATIENT)
Dept: CARDIOLOGY | Facility: CLINIC | Age: 82
End: 2017-08-21

## 2017-08-21 DIAGNOSIS — I10 ESSENTIAL HYPERTENSION: Primary | ICD-10-CM

## 2017-08-21 RX ORDER — AMLODIPINE BESYLATE 5 MG/1
5 TABLET ORAL DAILY
Qty: 30 TABLET | Refills: 12 | Status: SHIPPED | OUTPATIENT
Start: 2017-08-21 | End: 2018-04-06

## 2017-08-21 RX ORDER — AMLODIPINE BESYLATE 5 MG/1
5 TABLET ORAL DAILY
COMMUNITY
End: 2017-08-21 | Stop reason: SDUPTHER

## 2017-08-21 NOTE — TELEPHONE ENCOUNTER
Patient's son Dr. Lloyd called stating that his mother's blood pressure remains 170's/70's with HR 49-55. He started his mother on Lasix 20mg PO Qday. Dr. Sung reviewed and orders given for patient to start Amlodipine 5mg PO Qday. I called the son and gave him these instructions. Also told him to keep a blood pressure log and to call me next week with the readings. Verbalized understanding.

## 2017-10-30 DIAGNOSIS — E03.9 HYPOTHYROIDISM: ICD-10-CM

## 2017-10-30 RX ORDER — LEVOTHYROXINE SODIUM 100 UG/1
TABLET ORAL
Qty: 90 TABLET | Refills: 0 | Status: SHIPPED | OUTPATIENT
Start: 2017-10-30 | End: 2017-10-31 | Stop reason: SDUPTHER

## 2017-10-31 ENCOUNTER — LAB VISIT (OUTPATIENT)
Dept: LAB | Facility: HOSPITAL | Age: 82
End: 2017-10-31
Attending: INTERNAL MEDICINE
Payer: MEDICARE

## 2017-10-31 ENCOUNTER — OFFICE VISIT (OUTPATIENT)
Dept: INTERNAL MEDICINE | Facility: CLINIC | Age: 82
End: 2017-10-31
Payer: MEDICARE

## 2017-10-31 VITALS
HEIGHT: 61 IN | BODY MASS INDEX: 31.72 KG/M2 | RESPIRATION RATE: 16 BRPM | OXYGEN SATURATION: 90 % | TEMPERATURE: 98 F | HEART RATE: 67 BPM | SYSTOLIC BLOOD PRESSURE: 144 MMHG | DIASTOLIC BLOOD PRESSURE: 70 MMHG | WEIGHT: 168 LBS

## 2017-10-31 DIAGNOSIS — Z12.31 ENCOUNTER FOR SCREENING MAMMOGRAM FOR BREAST CANCER: ICD-10-CM

## 2017-10-31 DIAGNOSIS — I10 ESSENTIAL HYPERTENSION: ICD-10-CM

## 2017-10-31 DIAGNOSIS — Z00.00 ANNUAL PHYSICAL EXAM: ICD-10-CM

## 2017-10-31 DIAGNOSIS — Z00.00 ANNUAL PHYSICAL EXAM: Primary | ICD-10-CM

## 2017-10-31 DIAGNOSIS — E78.5 DYSLIPIDEMIA: ICD-10-CM

## 2017-10-31 DIAGNOSIS — N18.30 CKD (CHRONIC KIDNEY DISEASE) STAGE 3, GFR 30-59 ML/MIN: ICD-10-CM

## 2017-10-31 DIAGNOSIS — Z23 NEED FOR 23-POLYVALENT PNEUMOCOCCAL POLYSACCHARIDE VACCINE: ICD-10-CM

## 2017-10-31 DIAGNOSIS — E03.9 HYPOTHYROIDISM, UNSPECIFIED TYPE: ICD-10-CM

## 2017-10-31 DIAGNOSIS — I70.219 ATHEROSCLEROTIC PERIPHERAL VASCULAR DISEASE WITH INTERMITTENT CLAUDICATION: ICD-10-CM

## 2017-10-31 DIAGNOSIS — I27.0 PRIMARY PULMONARY HYPERTENSION: ICD-10-CM

## 2017-10-31 DIAGNOSIS — Z78.0 POSTMENOPAUSAL ESTROGEN DEFICIENCY: ICD-10-CM

## 2017-10-31 LAB
BASOPHILS # BLD AUTO: 0.05 K/UL
BASOPHILS NFR BLD: 0.4 %
DIFFERENTIAL METHOD: ABNORMAL
EOSINOPHIL # BLD AUTO: 0.2 K/UL
EOSINOPHIL NFR BLD: 1.3 %
ERYTHROCYTE [DISTWIDTH] IN BLOOD BY AUTOMATED COUNT: 17.2 %
ESTIMATED AVG GLUCOSE: 111 MG/DL
HBA1C MFR BLD HPLC: 5.5 %
HCT VFR BLD AUTO: 38.9 %
HGB BLD-MCNC: 12.6 G/DL
IMM GRANULOCYTES # BLD AUTO: 0.05 K/UL
IMM GRANULOCYTES NFR BLD AUTO: 0.4 %
IRON SERPL-MCNC: 56 UG/DL
LYMPHOCYTES # BLD AUTO: 6.2 K/UL
LYMPHOCYTES NFR BLD: 43.7 %
MCH RBC QN AUTO: 27.6 PG
MCHC RBC AUTO-ENTMCNC: 32.4 G/DL
MCV RBC AUTO: 85 FL
MONOCYTES # BLD AUTO: 0.9 K/UL
MONOCYTES NFR BLD: 6.7 %
NEUTROPHILS # BLD AUTO: 6.7 K/UL
NEUTROPHILS NFR BLD: 47.5 %
NRBC BLD-RTO: 0 /100 WBC
PLATELET # BLD AUTO: 345 K/UL
PMV BLD AUTO: 10.2 FL
RBC # BLD AUTO: 4.56 M/UL
SATURATED IRON: 12 %
TOTAL IRON BINDING CAPACITY: 485 UG/DL
TRANSFERRIN SERPL-MCNC: 328 MG/DL
TSH SERPL DL<=0.005 MIU/L-ACNC: 2.89 UIU/ML
WBC # BLD AUTO: 14.11 K/UL

## 2017-10-31 PROCEDURE — 36415 COLL VENOUS BLD VENIPUNCTURE: CPT | Mod: PO

## 2017-10-31 PROCEDURE — 84443 ASSAY THYROID STIM HORMONE: CPT

## 2017-10-31 PROCEDURE — 99397 PER PM REEVAL EST PAT 65+ YR: CPT | Mod: S$GLB,,, | Performed by: INTERNAL MEDICINE

## 2017-10-31 PROCEDURE — 99999 PR PBB SHADOW E&M-EST. PATIENT-LVL IV: CPT | Mod: PBBFAC,,, | Performed by: INTERNAL MEDICINE

## 2017-10-31 PROCEDURE — 83036 HEMOGLOBIN GLYCOSYLATED A1C: CPT

## 2017-10-31 PROCEDURE — 85025 COMPLETE CBC W/AUTO DIFF WBC: CPT

## 2017-10-31 PROCEDURE — 99499 UNLISTED E&M SERVICE: CPT | Mod: S$GLB,,, | Performed by: INTERNAL MEDICINE

## 2017-10-31 PROCEDURE — 90732 PPSV23 VACC 2 YRS+ SUBQ/IM: CPT | Mod: S$GLB,,, | Performed by: INTERNAL MEDICINE

## 2017-10-31 PROCEDURE — 83540 ASSAY OF IRON: CPT

## 2017-10-31 PROCEDURE — G0009 ADMIN PNEUMOCOCCAL VACCINE: HCPCS | Mod: S$GLB,,, | Performed by: INTERNAL MEDICINE

## 2017-10-31 RX ORDER — LEVOTHYROXINE SODIUM 100 UG/1
TABLET ORAL
Qty: 90 TABLET | Refills: 2 | Status: SHIPPED | OUTPATIENT
Start: 2017-10-31 | End: 2018-11-05 | Stop reason: SDUPTHER

## 2017-10-31 RX ORDER — ESCITALOPRAM OXALATE 10 MG/1
TABLET ORAL
Refills: 0 | COMMUNITY
Start: 2017-09-09

## 2017-10-31 NOTE — PROGRESS NOTES
"CC: Annual PE    89 yo female presents for PE  Nonsmoker  Social ETOH , wine only  FHX: + CAD and HTN    MEDS:  reviewed.     SCREENING TESTS:   Chol/lab:pending  C-scope:12/2012, Dr. Mcgarry, + hemorrhoids   denies blood in stool  EGD:4/2013, Dr. Toney    Benign-appearing esophageal stricture. Dilated.                            - Normal stomach.                                                            - Normal examined duodenum.   WWE: TAHRSO, has gyn  Mammo: pending  DEXA: 7/2014======UPDATE PENDING  Osteopenia of the femoral neck. Adult scoliosis associated with increased lumbar spine BMD.  FRAX calculation does not support treatment for osteoporosis.  Recommendations:  1) Adequate calcium and Vitamin D therapy  2) Appropriate exercise  3) Consider repeat BMD in 2- 4 years    Eye exam: UTD  DDS exam: UTD    VACCINATIONS:   FLU, yearly  Tetanus 06/2006.   Pneumovax 11/2007., 2017   Prevnar, 1/2015  Zostavax, 1/2014    REVIEW OF SYSTEMS:   Constitutional: No fever, chills, or night sweats.   Very, very tired; ++ cold intolerance- improved  Hx insomnia- sleeping very well  EYES: No double vision or d/c.   ENT: No ear pain or pressure, no sinus pain or pressure.    CV: No chest pain, shortness of breath, PND, orthopnea.  + claudication left leg w/ walking, several blocks  RESP: No cough or wheezing.   GI: no nausea, vomiting, or diarrhea    no constipation   : Not having any difficulty urinating, dysuria, or hematuria.   + nocturia x 2-3   MS: no stiffness, or weakness; joint swelling hands    + hip pain s/p left hip injection w/ relief- last o/v, thinks hip doing better, maybe 2/2 to decreased activity  NEURO: No HA or dizziness    No focal deficits or stroke like sx.   SKIN: No rashes or lesions.   ADL"S: no longer drives,   does all cooking, has maid, washes clothes and cleans her house   pays own bills w/ son and takes care of meds  Memory: Mild loss, delayed recall  Mental Health: spouse d. prefers to live " in  instead of [place]  AD's: + will, + living will, and + POA  Nutrition: Good  Gait: no falls  Safety: Intact  Urinary incontinence: +wears a pad, day and night, night worse  ROR negative except as previously noted      PAST MEDICAL HISTORY:   Hypertension of 30+ years,   Pulmonary heart disease,   Thyroid disease 20+ years,   MI in 2000, status post 2 stents   Visual disturbance, cataracts.   Osteoporosis,   Joint pain, s/p inj July 2009, injected w/ Supartz.   Breast lesion that was benign    PHYSICAL EXAM:    VSS:   GENERAL: She is alert and oriented, in no acute distress. She is well   developed, well nourished, conversant, and cooperative.   Pleasant as always  EYES: Conjunctivae and lids unremarkable. Sclerae anicteric.  Rims of the lid pink. Pupils reactive.   ENT: Hearing intact. Canals with slight cerumen.   TM visualized unremarkable.   Nasal mucosa, turbinates, and oropharynx   Unremarkable. Frenulum pink. Sinuses nontender.   NECK: Supple. No thyromegaly or lymphadenopathy noted.   RESPIRATORY: Efforts unlabored.   LUNGS: Clear to auscultation.   HEART: Regular rate and rhythm. No carotid bruits noted,   1+ pedal pulse. No edema.   ABDOMEN: Bowel sounds present, soft, nontender.  No hepatosplenomegaly noted.   MUSCULOSKELETAL: Gait normal. No clubbing, cyanosis, or edema.   NEURO: JAIME. No tremor noted.   SKIN:Warm and dry    IMPRESSION:   Annual PE  FHx: MI  FHx: Breast cancer  Old MI, asx  HTN, mildly elevated- pt son MD will check BP @ home  Primary pulmonary HTN, asx  HLD, asx  CKD III, asx  Hypothyroidism, stable by hx  Heart valve disorders-mod TR and mild MR and mild AR, stable by hx  PVD, s/p stent  Anemia, asx    PLAN:  Lab-\Urine  Pneumovax  Mammo  DEXA  Continue present meds  Call prn  RTC 3 mos BP check

## 2017-11-03 ENCOUNTER — HOSPITAL ENCOUNTER (OUTPATIENT)
Dept: RADIOLOGY | Facility: HOSPITAL | Age: 82
Discharge: HOME | End: 2017-11-03
Attending: INTERNAL MEDICINE
Payer: MEDICARE

## 2017-11-03 DIAGNOSIS — Z12.31 ENCOUNTER FOR SCREENING MAMMOGRAM FOR BREAST CANCER: ICD-10-CM

## 2017-11-03 PROCEDURE — 77067 SCR MAMMO BI INCL CAD: CPT | Mod: 26,,, | Performed by: RADIOLOGY

## 2017-11-03 PROCEDURE — 77067 SCR MAMMO BI INCL CAD: CPT | Mod: TC

## 2017-11-05 DIAGNOSIS — F33.41 MAJOR DEPRESSIVE DISORDER, RECURRENT EPISODE, IN PARTIAL REMISSION: ICD-10-CM

## 2017-11-05 DIAGNOSIS — F43.21 GRIEF: ICD-10-CM

## 2017-11-06 ENCOUNTER — TELEPHONE (OUTPATIENT)
Dept: INTERNAL MEDICINE | Facility: CLINIC | Age: 82
End: 2017-11-06

## 2017-11-06 NOTE — TELEPHONE ENCOUNTER
----- Message from Shadia Mcdonald MD sent at 11/4/2017  4:51 PM CDT -----  Ms. Lloyd, please note that your thyroid test was in the normal range, as was your 3 month average blood sugar test.  Your anemia has improved, although your iron studies still reveal the need to keep iron rich foods and supplements in your daily diet. Your white blood cell count was elevated, with an increased count of lymphocytes that can be seen with colds or viruses.  Your urine reported some blood on the chemical dip stick but when counted the RBC's ( red blood cells) were in the normal range.  Please let me know if you develop a fever or chills, burning when you urinate, chest congestion, or any new infectious symptom.  Shadia Woodard

## 2017-11-07 DIAGNOSIS — R06.02 SOB (SHORTNESS OF BREATH): Primary | ICD-10-CM

## 2017-11-09 NOTE — PROGRESS NOTES
Subjective:   Patient ID:  Symone Lloyd is a 88 y.o. female who presents for follow up of Coronary Artery Disease; Peripheral Arterial Disease (L SFA DCB); Hyperlipidemia; and Hypertension      Assessment:     1. Coronary artery disease involving native coronary artery of native heart without angina pectoris: s/p BMS to D1 and LCx 2001; 2.25 SKYLAR to mid LAD 2015. Nl EF by echo today   2. MCI (mild cognitive impairment)    3. Primary pulmonary hypertension : 2015 PA systolic 48mmHg, now 66 mmHg with TR   4. Atherosclerotic peripheral vascular disease with intermittent claudication : : s/p PTA-DCB to L.SFA: patent L. SFA by US     5. Dyslipidemia : nl HDL, LDL 94 on 40mg Lipitor   6. Essential hypertension : well controlled   7. 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 CKD (chronic kidney disease) stage 3, GFR 30-59 ml/min : stable       Plan:   Cont meds.  Reassurance.  RTC 1 yr.         HPI: Feels well.  Denies chest discomfort or claudication.  Is able to perform her normal daily activities without LOYOLA.  Denies ankle swelling.  She feels well and enjoys her independence.      2D ECHO: CONCLUSIONS     1 - Normal left ventricular systolic function (EF 55-60%). The inferior septum is mildly hypokinetic    2 - Impaired LV relaxation, elevated LAP (grade 2 diastolic dysfunction).     3 - Normal right ventricular systolic function .     4 - Pulmonary hypertension. The estimated PA systolic pressure is 66 mmHg.     5 - Mild mitral regurgitation.     6 - Moderate to severe tricuspid regurgitation. Tricuspid annulus is dilated.     7 - Biatrial enlargement.       Review of Systems   Constitution: Negative for diaphoresis.   Cardiovascular: Negative for chest pain, claudication, cyanosis, dyspnea on exertion, irregular heartbeat, leg swelling, near-syncope, orthopnea and palpitations.   Respiratory: Negative for shortness of breath and wheezing.    Musculoskeletal: Positive for arthritis and  stiffness.   Psychiatric/Behavioral: Positive for depression.   All other systems reviewed and are negative.      Past Medical History:   Diagnosis Date    Aortic valve disorders     Arthritis     Benign neoplasm of breast     Chest pain, unspecified     CHF (congestive heart failure)     Coronary artery disease     Depression     Diaphragmatic hernia without mention of obstruction or gangrene     Duodenal ulcer, unspecified as acute or chronic, without hemorrhage, perforation, or obstruction     Gastric ulcer, unspecified as acute or chronic, without mention of hemorrhage, perforation, or obstruction     GERD (gastroesophageal reflux disease)     Headache     Heart attack     History of migraine     HLD (hyperlipidemia)     HTN (hypertension)     Memory loss     Microscopic hematuria     Old myocardial infarction     Osteoporosis, unspecified     Personal history of colonic polyps     Primary pulmonary hypertension     Psychiatric problem     PTSD (post-traumatic stress disorder)     Rosacea     Stricture and stenosis of esophagus     Therapy     Unspecified hypothyroidism     Volvulus        Past Surgical History:   Procedure Laterality Date    ANGIOPLASTY      APPENDECTOMY      Open    BREAST BIOPSY      CATARACT EXTRACTION, BILATERAL      COLONOSCOPY W/ POLYPECTOMY      CORONARY ANGIOPLASTY      s/p stents    ESOPHAGOGASTRODUODENOSCOPY      TONSILLECTOMY, ADENOIDECTOMY      TOTAL ABDOMINAL HYSTERECTOMY      rso, endometriosis       Social History   Substance Use Topics    Smoking status: Never Smoker    Smokeless tobacco: Never Used    Alcohol use 0.6 oz/week     1 Glasses of wine per week      Comment: wine - nightly       Family History   Problem Relation Age of Onset    Heart failure Mother      d. 81    Heart attack Father      d. 76    Hypertension Father     Heart failure Father     Heart disease Father     No Known Problems Maternal Grandfather     No Known  "Problems Paternal Grandmother     No Known Problems Paternal Grandfather     No Known Problems Maternal Aunt     No Known Problems Maternal Uncle     No Known Problems Paternal Aunt     No Known Problems Paternal Uncle     Breast cancer Sister        Current Outpatient Prescriptions   Medication Sig    amlodipine (NORVASC) 5 MG tablet Take 1 tablet (5 mg total) by mouth once daily.    atorvastatin (LIPITOR) 20 MG tablet Take 4 tablets (80 mg total) by mouth once daily.    captopril (CAPOTEN) 50 MG tablet Take 50 mg by mouth once daily.    carvedilol (COREG) 25 MG tablet Take 25 mg by mouth 2 (two) times daily with meals.    clopidogrel (PLAVIX) 75 mg tablet Take 1 tablet (75 mg total) by mouth once daily.    escitalopram oxalate (LEXAPRO) 10 MG tablet     estrogens, conjugated, (PREMARIN) 0.3 MG tablet Take 1 tablet (0.3 mg total) by mouth every evening.    FLUZONE HIGH-DOSE 2017-18, PF, 180 mcg/0.5 mL vaccine ADMINISTER 0.5ML IN THE MUSCLE AS DIRECTED    levothyroxine (SYNTHROID) 100 MCG tablet TAKE 1 TABLET(100 MCG) BY MOUTH EVERY DAY    omeprazole (PRILOSEC) 20 MG capsule Take 1 capsule (20 mg total) by mouth 2 (two) times daily.    VIRT-MICHAEL FORTE 2.5-25-2 mg Tab TAKE 1 TABLET BY MOUTH EVERY DAY    acetaminophen (TYLENOL) 80 MG Chew Take 500 mg by mouth as needed.    nitroGLYCERIN (NITROSTAT) 0.4 MG SL tablet Place 1 tablet (0.4 mg total) under the tongue every 5 (five) minutes as needed for Chest pain.     No current facility-administered medications for this visit.        Review of patient's allergies indicates:   Allergen Reactions    Sulfa (sulfonamide antibiotics) Anaphylaxis       Objective:     BP (!) 108/53 (BP Location: Right arm, Patient Position: Sitting, BP Method: Large (Automatic))   Pulse (!) 58   Ht 5' 2" (1.575 m)   Wt 75.5 kg (166 lb 7.2 oz)   SpO2 95%   BMI 30.44 kg/m²     Physical Exam   Constitutional: She is oriented to person, place, and time. She appears " well-developed and well-nourished.   HENT:   Head: Normocephalic and atraumatic.   Eyes: Pupils are equal, round, and reactive to light.   Neck: Normal range of motion. Neck supple. No thyromegaly present.   Cardiovascular: Normal rate, regular rhythm, S1 normal, S2 normal, normal heart sounds and intact distal pulses.    No murmur heard.  Pulses:       Carotid pulses are 2+ on the right side, and 2+ on the left side.       Radial pulses are 2+ on the right side, and 2+ on the left side.        Femoral pulses are 2+ on the right side, and 2+ on the left side.       Popliteal pulses are 2+ on the right side, and 2+ on the left side.        Dorsalis pedis pulses are 2+ on the right side, and 2+ on the left side.        Posterior tibial pulses are 2+ on the right side, and 2+ on the left side.   Pulmonary/Chest: Effort normal and breath sounds normal. No respiratory distress. She has no wheezes. She has no rales.   Abdominal: Soft. Bowel sounds are normal. She exhibits no distension. There is no hepatosplenomegaly. There is no tenderness.   Musculoskeletal: Normal range of motion. She exhibits no edema or tenderness.   Neurological: She is alert and oriented to person, place, and time. No cranial nerve deficit.   Skin: Skin is warm.   Psychiatric: She has a normal mood and affect. Her behavior is normal. Judgment and thought content normal.   Nursing note and vitals reviewed.        Chemistry        Component Value Date/Time     11/10/2017 0740    K 4.4 11/10/2017 0740     11/10/2017 0740    CO2 24 11/10/2017 0740    BUN 37 (H) 11/10/2017 0740    CREATININE 1.1 11/10/2017 0740     11/10/2017 0740        Component Value Date/Time    CALCIUM 9.3 11/10/2017 0740    ALKPHOS 78 12/15/2015 1538    AST 20 12/15/2015 1538    ALT 17 12/15/2015 1538    BILITOT 0.3 12/15/2015 1538    ESTGFRAFRICA 51.8 (A) 11/10/2017 0740    EGFRNONAA 44.9 (A) 11/10/2017 0740            Lab Results   Component Value Date    CHOL  183 11/10/2017    CHOL 192 07/07/2017    CHOL 272 (H) 03/10/2017     Lab Results   Component Value Date    HDL 72 11/10/2017    HDL 59 07/07/2017    HDL 57 03/10/2017     Lab Results   Component Value Date    LDLCALC 94.2 11/10/2017    LDLCALC 110.0 07/07/2017    LDLCALC 194.0 (H) 03/10/2017     Lab Results   Component Value Date    TRIG 84 11/10/2017    TRIG 115 07/07/2017    TRIG 105 03/10/2017     Lab Results   Component Value Date    CHOLHDL 39.3 11/10/2017    CHOLHDL 30.7 07/07/2017    CHOLHDL 21.0 03/10/2017         Lab Results   Component Value Date     11/10/2017    K 4.4 11/10/2017     11/10/2017    CO2 24 11/10/2017    BUN 37 (H) 11/10/2017    CREATININE 1.1 11/10/2017     11/10/2017    HGBA1C 5.5 10/31/2017    MG 1.9 10/26/2015    AST 20 12/15/2015    ALT 17 12/15/2015    ALBUMIN 3.5 12/15/2015    PROT 6.7 12/15/2015    BILITOT 0.3 12/15/2015    WBC 12.46 11/10/2017    HGB 11.9 (L) 11/10/2017    HCT 36.4 (L) 11/10/2017    HCT 40 06/07/2017    MCV 83 11/10/2017     11/10/2017    INR 0.9 10/09/2015    TSH 2.886 10/31/2017    CHOL 183 11/10/2017    HDL 72 11/10/2017    LDLCALC 94.2 11/10/2017    TRIG 84 11/10/2017

## 2017-11-10 ENCOUNTER — HOSPITAL ENCOUNTER (OUTPATIENT)
Dept: CARDIOLOGY | Facility: CLINIC | Age: 82
Discharge: HOME | End: 2017-11-10
Attending: INTERNAL MEDICINE
Payer: MEDICARE

## 2017-11-10 ENCOUNTER — OFFICE VISIT (OUTPATIENT)
Dept: CARDIOLOGY | Facility: CLINIC | Age: 82
End: 2017-11-10
Payer: MEDICARE

## 2017-11-10 VITALS
OXYGEN SATURATION: 95 % | BODY MASS INDEX: 30.63 KG/M2 | DIASTOLIC BLOOD PRESSURE: 53 MMHG | SYSTOLIC BLOOD PRESSURE: 108 MMHG | WEIGHT: 166.44 LBS | HEIGHT: 62 IN | HEART RATE: 58 BPM

## 2017-11-10 DIAGNOSIS — N18.30 CKD (CHRONIC KIDNEY DISEASE) STAGE 3, GFR 30-59 ML/MIN: ICD-10-CM

## 2017-11-10 DIAGNOSIS — E78.5 DYSLIPIDEMIA: Primary | ICD-10-CM

## 2017-11-10 DIAGNOSIS — I10 ESSENTIAL HYPERTENSION: ICD-10-CM

## 2017-11-10 DIAGNOSIS — I70.219 ATHEROSCLEROTIC PERIPHERAL VASCULAR DISEASE WITH INTERMITTENT CLAUDICATION: ICD-10-CM

## 2017-11-10 DIAGNOSIS — I25.10 CORONARY ARTERY DISEASE, ANGINA PRESENCE UNSPECIFIED, UNSPECIFIED VESSEL OR LESION TYPE, UNSPECIFIED WHETHER NATIVE OR TRANSPLANTED HEART: ICD-10-CM

## 2017-11-10 DIAGNOSIS — E78.5 DYSLIPIDEMIA: ICD-10-CM

## 2017-11-10 DIAGNOSIS — I25.10 CORONARY ARTERY DISEASE INVOLVING NATIVE CORONARY ARTERY OF NATIVE HEART WITHOUT ANGINA PECTORIS: Primary | ICD-10-CM

## 2017-11-10 DIAGNOSIS — I38 HEART VALVE DISEASE: ICD-10-CM

## 2017-11-10 DIAGNOSIS — G31.84 MCI (MILD COGNITIVE IMPAIRMENT): ICD-10-CM

## 2017-11-10 DIAGNOSIS — R06.02 SOB (SHORTNESS OF BREATH): ICD-10-CM

## 2017-11-10 DIAGNOSIS — I27.0 PRIMARY PULMONARY HYPERTENSION: ICD-10-CM

## 2017-11-10 LAB
DIASTOLIC DYSFUNCTION: YES
ESTIMATED PA SYSTOLIC PRESSURE: 66.04
MITRAL VALVE REGURGITATION: ABNORMAL
RETIRED EF AND QEF - SEE NOTES: 55 (ref 55–65)
TRICUSPID VALVE REGURGITATION: ABNORMAL

## 2017-11-10 PROCEDURE — 93306 TTE W/DOPPLER COMPLETE: CPT | Mod: S$GLB,,, | Performed by: INTERNAL MEDICINE

## 2017-11-10 PROCEDURE — 99213 OFFICE O/P EST LOW 20 MIN: CPT | Mod: S$GLB,,, | Performed by: INTERNAL MEDICINE

## 2017-11-10 PROCEDURE — 99999 PR PBB SHADOW E&M-EST. PATIENT-LVL V: CPT | Mod: PBBFAC,,, | Performed by: INTERNAL MEDICINE

## 2017-11-10 PROCEDURE — 99499 UNLISTED E&M SERVICE: CPT | Mod: S$GLB,,, | Performed by: INTERNAL MEDICINE

## 2017-11-14 ENCOUNTER — TELEPHONE (OUTPATIENT)
Dept: INTERNAL MEDICINE | Facility: CLINIC | Age: 82
End: 2017-11-14

## 2017-11-14 NOTE — TELEPHONE ENCOUNTER
----- Message from Shadia Mcdonald MD sent at 11/13/2017  7:53 PM CST -----  Please note that your bone density revealed thinning of your bone, but not to a degree that prescriptive medication is recommended. If you are interested in a session with the physical therapist to review the best exercises to keep your bones  Strong, please let me know.  Shadia Woodard

## 2017-12-18 RX ORDER — ESCITALOPRAM OXALATE 10 MG/1
TABLET ORAL
Qty: 90 TABLET | Refills: 0 | OUTPATIENT
Start: 2017-12-18

## 2018-01-26 ENCOUNTER — OFFICE VISIT (OUTPATIENT)
Dept: CARDIOLOGY | Facility: CLINIC | Age: 83
End: 2018-01-26
Payer: MEDICARE

## 2018-01-26 VITALS
HEIGHT: 62 IN | OXYGEN SATURATION: 93 % | HEART RATE: 57 BPM | BODY MASS INDEX: 31.11 KG/M2 | SYSTOLIC BLOOD PRESSURE: 173 MMHG | DIASTOLIC BLOOD PRESSURE: 73 MMHG | WEIGHT: 169.06 LBS

## 2018-01-26 DIAGNOSIS — G31.84 MCI (MILD COGNITIVE IMPAIRMENT): ICD-10-CM

## 2018-01-26 DIAGNOSIS — R06.82 TACHYPNEA: ICD-10-CM

## 2018-01-26 DIAGNOSIS — N18.30 CKD (CHRONIC KIDNEY DISEASE) STAGE 3, GFR 30-59 ML/MIN: ICD-10-CM

## 2018-01-26 DIAGNOSIS — E78.5 DYSLIPIDEMIA: ICD-10-CM

## 2018-01-26 DIAGNOSIS — I70.219 ATHEROSCLEROTIC PERIPHERAL VASCULAR DISEASE WITH INTERMITTENT CLAUDICATION: ICD-10-CM

## 2018-01-26 DIAGNOSIS — I27.0 PRIMARY PULMONARY HYPERTENSION: Primary | ICD-10-CM

## 2018-01-26 DIAGNOSIS — I25.10 CORONARY ARTERY DISEASE INVOLVING NATIVE CORONARY ARTERY OF NATIVE HEART WITHOUT ANGINA PECTORIS: ICD-10-CM

## 2018-01-26 DIAGNOSIS — I27.9 CHRONIC PULMONARY HEART DISEASE: ICD-10-CM

## 2018-01-26 DIAGNOSIS — Z79.899 POLYPHARMACY: Primary | ICD-10-CM

## 2018-01-26 DIAGNOSIS — D50.0 IRON DEFICIENCY ANEMIA DUE TO CHRONIC BLOOD LOSS: ICD-10-CM

## 2018-01-26 DIAGNOSIS — I38 HEART VALVE DISEASE: ICD-10-CM

## 2018-01-26 DIAGNOSIS — I10 ESSENTIAL HYPERTENSION: ICD-10-CM

## 2018-01-26 PROCEDURE — 99999 PR PBB SHADOW E&M-EST. PATIENT-LVL V: CPT | Mod: PBBFAC,,, | Performed by: INTERNAL MEDICINE

## 2018-01-26 PROCEDURE — 99213 OFFICE O/P EST LOW 20 MIN: CPT | Mod: S$GLB,,, | Performed by: INTERNAL MEDICINE

## 2018-01-26 RX ORDER — SPIRONOLACTONE 25 MG/1
25 TABLET ORAL DAILY
COMMUNITY
Start: 2017-12-18

## 2018-01-26 NOTE — PROGRESS NOTES
Subjective:   Patient ID:  Symone Lloyd is a 88 y.o. female who presents for follow up of Coronary Artery Disease (PCI 2001 and 2015); Peripheral Arterial Disease (s/p PTA DCB 2015); Chronic Kidney Disease (CKD 3); Hypertension; Hyperlipidemia; and Pulmonary Hypertension      Assessment:     1. Primary pulmonary hypertension    2. Coronary artery disease involving native coronary artery of native heart without angina pectoris    3. Atherosclerotic peripheral vascular disease with intermittent claudication : : s/p PTA-DCB to L.SFA    4. Dyslipidemia    5. Essential hypertension    6. Heart valve disease: mod TR, Mild AR and MR    7. MCI (mild cognitive impairment)    8. Iron deficiency anemia due to chronic blood loss    9. CKD (chronic kidney disease) stage 3, GFR 30-59 ml/min        Plan:     Referral to Dr. Christianson for Pulmonary Htn management and assessment of increased LOYOLA.  Cont to monitor BP at home.  Cont meds for now.  Encourage activity.           HPI: Mrs. Lloyd reports increasing LOYOLA so that now she gets shortness of breath at home walking from the couch to the bathroom and back.  She feels it is increasing gradually.  No sudden onset.  She notes she has a longer expiratory phase when she is winded. She does not exercise and gets no sustained activity.      11/2018 2D ECHO:CONCLUSIONS     1 - Normal left ventricular systolic function (EF 55-60%). The inferior septum is mildly hypokinetic    2 - Impaired LV relaxation, elevated LAP (grade 2 diastolic dysfunction).     3 - Normal right ventricular systolic function .     4 - Pulmonary hypertension. The estimated PA systolic pressure is 66 mmHg.     5 - Mild mitral regurgitation.     6 - Moderate to severe tricuspid regurgitation. Tricuspid annulus is dilated.     7 - Biatrial enlargement.           Review of Systems   Constitution: Negative for diaphoresis.   Cardiovascular: Negative for chest pain, claudication, cyanosis, dyspnea on exertion,  irregular heartbeat, leg swelling, near-syncope, orthopnea and palpitations.   Respiratory: Positive for shortness of breath. Negative for wheezing.    Psychiatric/Behavioral: Positive for depression.   All other systems reviewed and are negative.      Past Medical History:   Diagnosis Date    Aortic valve disorders     Arthritis     Benign neoplasm of breast     Chest pain, unspecified     CHF (congestive heart failure)     Coronary artery disease     Depression     Diaphragmatic hernia without mention of obstruction or gangrene     Duodenal ulcer, unspecified as acute or chronic, without hemorrhage, perforation, or obstruction     Gastric ulcer, unspecified as acute or chronic, without mention of hemorrhage, perforation, or obstruction     GERD (gastroesophageal reflux disease)     Headache     Heart attack     History of migraine     HLD (hyperlipidemia)     HTN (hypertension)     Memory loss     Microscopic hematuria     Old myocardial infarction     Osteoporosis, unspecified     Personal history of colonic polyps     Primary pulmonary hypertension     Psychiatric problem     PTSD (post-traumatic stress disorder)     Rosacea     Stricture and stenosis of esophagus     Therapy     Unspecified hypothyroidism     Volvulus        Past Surgical History:   Procedure Laterality Date    ANGIOPLASTY      APPENDECTOMY      Open    BREAST BIOPSY      CATARACT EXTRACTION, BILATERAL      COLONOSCOPY W/ POLYPECTOMY      CORONARY ANGIOPLASTY      s/p stents    ESOPHAGOGASTRODUODENOSCOPY      TONSILLECTOMY, ADENOIDECTOMY      TOTAL ABDOMINAL HYSTERECTOMY      rso, endometriosis       Social History   Substance Use Topics    Smoking status: Never Smoker    Smokeless tobacco: Never Used    Alcohol use 0.6 oz/week     1 Glasses of wine per week      Comment: wine - nightly       Family History   Problem Relation Age of Onset    Heart failure Mother      d. 81    Heart attack Father       d. 76    Hypertension Father     Heart failure Father     Heart disease Father     No Known Problems Maternal Grandfather     No Known Problems Paternal Grandmother     No Known Problems Paternal Grandfather     No Known Problems Maternal Aunt     No Known Problems Maternal Uncle     No Known Problems Paternal Aunt     No Known Problems Paternal Uncle     Breast cancer Sister        Current Outpatient Prescriptions   Medication Sig    acetaminophen (TYLENOL) 80 MG Chew Take 500 mg by mouth as needed.    amlodipine (NORVASC) 5 MG tablet Take 1 tablet (5 mg total) by mouth once daily.    atorvastatin (LIPITOR) 20 MG tablet Take 4 tablets (80 mg total) by mouth once daily.    captopril (CAPOTEN) 50 MG tablet Take 50 mg by mouth once daily.    carvedilol (COREG) 25 MG tablet Take 25 mg by mouth 2 (two) times daily with meals.    clopidogrel (PLAVIX) 75 mg tablet Take 1 tablet (75 mg total) by mouth once daily.    escitalopram oxalate (LEXAPRO) 10 MG tablet     estrogens, conjugated, (PREMARIN) 0.3 MG tablet Take 1 tablet (0.3 mg total) by mouth every evening.    FLUZONE HIGH-DOSE 2017-18, PF, 180 mcg/0.5 mL vaccine ADMINISTER 0.5ML IN THE MUSCLE AS DIRECTED    levothyroxine (SYNTHROID) 100 MCG tablet TAKE 1 TABLET(100 MCG) BY MOUTH EVERY DAY    omeprazole (PRILOSEC) 20 MG capsule Take 1 capsule (20 mg total) by mouth 2 (two) times daily. (Patient taking differently: Take 20 mg by mouth once daily. )    spironolactone (ALDACTONE) 25 MG tablet     VIRT-MICHAEL FORTE 2.5-25-2 mg Tab TAKE 1 TABLET BY MOUTH EVERY DAY    nitroGLYCERIN (NITROSTAT) 0.4 MG SL tablet Place 1 tablet (0.4 mg total) under the tongue every 5 (five) minutes as needed for Chest pain.     No current facility-administered medications for this visit.        Review of patient's allergies indicates:   Allergen Reactions    Sulfa (sulfonamide antibiotics) Anaphylaxis       Objective:     BP (!) 173/73 (BP Location: Right arm, Patient  "Position: Sitting, BP Method: Large (Automatic))   Pulse (!) 57   Ht 5' 2" (1.575 m)   Wt 76.7 kg (169 lb 1.5 oz)   SpO2 (!) 93%   BMI 30.93 kg/m²     Physical Exam   Constitutional: She is oriented to person, place, and time. She appears well-developed and well-nourished.   HENT:   Head: Normocephalic and atraumatic.   Eyes: Pupils are equal, round, and reactive to light.   Neck: Normal range of motion. Neck supple. No thyromegaly present.   Cardiovascular: Normal rate, regular rhythm, S1 normal, S2 normal, normal heart sounds and intact distal pulses.    No murmur heard.  Pulses:       Carotid pulses are 2+ on the right side, and 2+ on the left side.       Radial pulses are 2+ on the right side, and 2+ on the left side.        Femoral pulses are 2+ on the right side, and 2+ on the left side.       Popliteal pulses are 2+ on the right side, and 2+ on the left side.        Dorsalis pedis pulses are 2+ on the right side, and 2+ on the left side.        Posterior tibial pulses are 2+ on the right side, and 2+ on the left side.   Pulmonary/Chest: Effort normal and breath sounds normal. No respiratory distress. She has no wheezes. She has no rales.   Abdominal: Soft. Bowel sounds are normal. She exhibits no distension. There is no hepatosplenomegaly. There is no tenderness.   Musculoskeletal: Normal range of motion. She exhibits no edema or tenderness.   Neurological: She is alert and oriented to person, place, and time. No cranial nerve deficit.   Skin: Skin is warm.   Psychiatric: She has a normal mood and affect. Her behavior is normal. Judgment and thought content normal.   Nursing note and vitals reviewed.        Chemistry        Component Value Date/Time     11/10/2017 0740    K 4.4 11/10/2017 0740     11/10/2017 0740    CO2 24 11/10/2017 0740    BUN 37 (H) 11/10/2017 0740    CREATININE 1.1 11/10/2017 0740     11/10/2017 0740        Component Value Date/Time    CALCIUM 9.3 11/10/2017 0740 "    ALKPHOS 78 12/15/2015 1538    AST 20 12/15/2015 1538    ALT 17 12/15/2015 1538    BILITOT 0.3 12/15/2015 1538    ESTGFRAFRICA 51.8 (A) 11/10/2017 0740    EGFRNONAA 44.9 (A) 11/10/2017 0740            Lab Results   Component Value Date    CHOL 183 11/10/2017    CHOL 192 07/07/2017    CHOL 272 (H) 03/10/2017     Lab Results   Component Value Date    HDL 72 11/10/2017    HDL 59 07/07/2017    HDL 57 03/10/2017     Lab Results   Component Value Date    LDLCALC 94.2 11/10/2017    LDLCALC 110.0 07/07/2017    LDLCALC 194.0 (H) 03/10/2017     Lab Results   Component Value Date    TRIG 84 11/10/2017    TRIG 115 07/07/2017    TRIG 105 03/10/2017     Lab Results   Component Value Date    CHOLHDL 39.3 11/10/2017    CHOLHDL 30.7 07/07/2017    CHOLHDL 21.0 03/10/2017         Lab Results   Component Value Date     11/10/2017    K 4.4 11/10/2017     11/10/2017    CO2 24 11/10/2017    BUN 37 (H) 11/10/2017    CREATININE 1.1 11/10/2017     11/10/2017    HGBA1C 5.5 10/31/2017    MG 1.9 10/26/2015    AST 20 12/15/2015    ALT 17 12/15/2015    ALBUMIN 3.5 12/15/2015    PROT 6.7 12/15/2015    BILITOT 0.3 12/15/2015    WBC 12.46 11/10/2017    HGB 11.9 (L) 11/10/2017    HCT 36.4 (L) 11/10/2017    HCT 40 06/07/2017    MCV 83 11/10/2017     11/10/2017    INR 0.9 10/09/2015    TSH 2.886 10/31/2017    CHOL 183 11/10/2017    HDL 72 11/10/2017    LDLCALC 94.2 11/10/2017    TRIG 84 11/10/2017

## 2018-01-29 ENCOUNTER — INITIAL CONSULT (OUTPATIENT)
Dept: TRANSPLANT | Facility: CLINIC | Age: 83
End: 2018-01-29
Payer: MEDICARE

## 2018-01-29 VITALS
DIASTOLIC BLOOD PRESSURE: 69 MMHG | OXYGEN SATURATION: 94 % | HEART RATE: 46 BPM | SYSTOLIC BLOOD PRESSURE: 156 MMHG | BODY MASS INDEX: 30.59 KG/M2 | WEIGHT: 166.25 LBS | HEIGHT: 62 IN

## 2018-01-29 DIAGNOSIS — I38 HEART VALVE DISEASE: ICD-10-CM

## 2018-01-29 DIAGNOSIS — E78.5 DYSLIPIDEMIA: ICD-10-CM

## 2018-01-29 DIAGNOSIS — I10 ESSENTIAL HYPERTENSION: ICD-10-CM

## 2018-01-29 DIAGNOSIS — I27.0 PRIMARY PULMONARY HYPERTENSION: Primary | ICD-10-CM

## 2018-01-29 DIAGNOSIS — I25.10 CORONARY ARTERY DISEASE INVOLVING NATIVE CORONARY ARTERY OF NATIVE HEART WITHOUT ANGINA PECTORIS: ICD-10-CM

## 2018-01-29 DIAGNOSIS — N18.30 CKD (CHRONIC KIDNEY DISEASE) STAGE 3, GFR 30-59 ML/MIN: ICD-10-CM

## 2018-01-29 DIAGNOSIS — G31.84 MCI (MILD COGNITIVE IMPAIRMENT): ICD-10-CM

## 2018-01-29 PROCEDURE — 99204 OFFICE O/P NEW MOD 45 MIN: CPT | Mod: S$GLB,,, | Performed by: INTERNAL MEDICINE

## 2018-01-29 PROCEDURE — 99999 PR PBB SHADOW E&M-EST. PATIENT-LVL III: CPT | Mod: PBBFAC,,, | Performed by: INTERNAL MEDICINE

## 2018-01-29 NOTE — PROGRESS NOTES
Subjective:   Patient ID:  Symone Lloyd is a 88 y.o. female who presents for follow up of Pulmonary Hypertension (New Consult visit from Dr. Charan Sung)      Assessment:     1. Primary pulmonary hypertension    2. MCI (mild cognitive impairment)    3. Coronary artery disease involving native coronary artery of native heart without angina pectoris    4. Heart valve disease: mod TR, Mild AR and MR    5. Essential hypertension    6. CKD (chronic kidney disease) stage 3, GFR 30-59 ml/min    7. Dyslipidemia      Several reasons for shortness of breath including diastolic dysfunction and HTN as well as PHTN. Agree with aldactone (for diastolic dysfunction). Her heart rate is low on carvedilol (exercise intolerance of the drug).    I will recommend decrease coreg to 12.5 mg to twice a day  Amlodipine 10 mg daily     Saleem will call me and see if this helps    Plan:     HPI: She is here for dyspnea on exertion and pulmonary hypertension. LOYOLA is not knew but it has been worse for the last 6 months. She has diastolic dysfunction    11/2018 2D ECHO:CONCLUSIONS     1 - Normal left ventricular systolic function (EF 55-60%). The inferior septum is mildly hypokinetic    2 - Impaired LV relaxation, elevated LAP (grade 2 diastolic dysfunction).     3 - Normal right ventricular systolic function .     4 - Pulmonary hypertension. The estimated PA systolic pressure is 66 mmHg.     5 - Mild mitral regurgitation.     6 - Moderate to severe tricuspid regurgitation. Tricuspid annulus is dilated.     7 - Biatrial enlargement.       Review of Systems   Constitution: Negative for diaphoresis.   Cardiovascular: Negative for chest pain, claudication, cyanosis, dyspnea on exertion, irregular heartbeat, leg swelling, near-syncope, orthopnea and palpitations.   Respiratory: Positive for shortness of breath. Negative for wheezing.    Psychiatric/Behavioral: Negative for depression.   All other systems reviewed and are negative.      Past  Medical History:   Diagnosis Date    Aortic valve disorders     Arthritis     Benign neoplasm of breast     Chest pain, unspecified     CHF (congestive heart failure)     Coronary artery disease     Depression     Diaphragmatic hernia without mention of obstruction or gangrene     Duodenal ulcer, unspecified as acute or chronic, without hemorrhage, perforation, or obstruction     Gastric ulcer, unspecified as acute or chronic, without mention of hemorrhage, perforation, or obstruction     GERD (gastroesophageal reflux disease)     Headache     Heart attack     History of migraine     HLD (hyperlipidemia)     HTN (hypertension)     Memory loss     Microscopic hematuria     Old myocardial infarction     Osteoporosis, unspecified     Personal history of colonic polyps     Primary pulmonary hypertension     Psychiatric problem     PTSD (post-traumatic stress disorder)     Rosacea     Stricture and stenosis of esophagus     Therapy     Unspecified hypothyroidism     Volvulus        Past Surgical History:   Procedure Laterality Date    ANGIOPLASTY      APPENDECTOMY      Open    BREAST BIOPSY      CATARACT EXTRACTION, BILATERAL      COLONOSCOPY W/ POLYPECTOMY      CORONARY ANGIOPLASTY      s/p stents    ESOPHAGOGASTRODUODENOSCOPY      TONSILLECTOMY, ADENOIDECTOMY      TOTAL ABDOMINAL HYSTERECTOMY      rso, endometriosis       Social History   Substance Use Topics    Smoking status: Never Smoker    Smokeless tobacco: Never Used    Alcohol use 0.6 oz/week     1 Glasses of wine per week      Comment: wine - nightly       Family History   Problem Relation Age of Onset    Heart failure Mother      d. 81    Heart attack Father      d. 76    Hypertension Father     Heart failure Father     Heart disease Father     No Known Problems Maternal Grandfather     No Known Problems Paternal Grandmother     No Known Problems Paternal Grandfather     No Known Problems Maternal Aunt     No  "Known Problems Maternal Uncle     No Known Problems Paternal Aunt     No Known Problems Paternal Uncle     Breast cancer Sister        Current Outpatient Prescriptions   Medication Sig    acetaminophen (TYLENOL) 80 MG Chew Take 500 mg by mouth as needed.    amlodipine (NORVASC) 5 MG tablet Take 1 tablet (5 mg total) by mouth once daily.    atorvastatin (LIPITOR) 20 MG tablet Take 4 tablets (80 mg total) by mouth once daily.    captopril (CAPOTEN) 50 MG tablet Take 50 mg by mouth once daily.    carvedilol (COREG) 25 MG tablet Take 25 mg by mouth 2 (two) times daily with meals.    clopidogrel (PLAVIX) 75 mg tablet Take 1 tablet (75 mg total) by mouth once daily.    escitalopram oxalate (LEXAPRO) 10 MG tablet     estrogens, conjugated, (PREMARIN) 0.3 MG tablet Take 1 tablet (0.3 mg total) by mouth every evening.    FLUZONE HIGH-DOSE 2017-18, PF, 180 mcg/0.5 mL vaccine ADMINISTER 0.5ML IN THE MUSCLE AS DIRECTED    levothyroxine (SYNTHROID) 100 MCG tablet TAKE 1 TABLET(100 MCG) BY MOUTH EVERY DAY    omeprazole (PRILOSEC) 20 MG capsule Take 1 capsule (20 mg total) by mouth 2 (two) times daily. (Patient taking differently: Take 20 mg by mouth once daily. )    spironolactone (ALDACTONE) 25 MG tablet     VIRT-MICHAEL FORTE 2.5-25-2 mg Tab TAKE 1 TABLET BY MOUTH EVERY DAY    nitroGLYCERIN (NITROSTAT) 0.4 MG SL tablet Place 1 tablet (0.4 mg total) under the tongue every 5 (five) minutes as needed for Chest pain.     No current facility-administered medications for this visit.        Review of patient's allergies indicates:   Allergen Reactions    Sulfa (sulfonamide antibiotics) Anaphylaxis       Objective:     BP (!) 156/69   Pulse (!) 46   Ht 5' 2" (1.575 m)   Wt 75.4 kg (166 lb 3.6 oz)   SpO2 (!) 94%   BMI 30.40 kg/m²     Physical Exam   Constitutional: She is oriented to person, place, and time. She appears well-developed and well-nourished.   HENT:   Head: Normocephalic and atraumatic.   Eyes: Pupils " are equal, round, and reactive to light.   Neck: Normal range of motion. Neck supple. No thyromegaly present.   Cardiovascular: Normal rate, regular rhythm, S1 normal, S2 normal, normal heart sounds and intact distal pulses.    No murmur heard.  Pulses:       Carotid pulses are 2+ on the right side, and 2+ on the left side.       Radial pulses are 2+ on the right side, and 2+ on the left side.        Femoral pulses are 2+ on the right side, and 2+ on the left side.       Popliteal pulses are 2+ on the right side, and 2+ on the left side.        Dorsalis pedis pulses are 2+ on the right side, and 2+ on the left side.        Posterior tibial pulses are 2+ on the right side, and 2+ on the left side.   Pulmonary/Chest: Effort normal and breath sounds normal. No respiratory distress. She has no wheezes. She has no rales.   Abdominal: Soft. Bowel sounds are normal. She exhibits no distension. There is no hepatosplenomegaly. There is no tenderness.   Musculoskeletal: Normal range of motion. She exhibits no edema or tenderness.   Neurological: She is alert and oriented to person, place, and time. No cranial nerve deficit.   Skin: Skin is warm.   Psychiatric: She has a normal mood and affect. Her behavior is normal. Judgment and thought content normal.   Nursing note and vitals reviewed.        Chemistry        Component Value Date/Time     11/10/2017 0740    K 4.4 11/10/2017 0740     11/10/2017 0740    CO2 24 11/10/2017 0740    BUN 37 (H) 11/10/2017 0740    CREATININE 1.1 11/10/2017 0740     11/10/2017 0740        Component Value Date/Time    CALCIUM 9.3 11/10/2017 0740    ALKPHOS 78 12/15/2015 1538    AST 20 12/15/2015 1538    ALT 17 12/15/2015 1538    BILITOT 0.3 12/15/2015 1538    ESTGFRAFRICA 51.8 (A) 11/10/2017 0740    EGFRNONAA 44.9 (A) 11/10/2017 0740            Lab Results   Component Value Date    CHOL 183 11/10/2017    CHOL 192 07/07/2017    CHOL 272 (H) 03/10/2017     Lab Results   Component  Value Date    HDL 72 11/10/2017    HDL 59 07/07/2017    HDL 57 03/10/2017     Lab Results   Component Value Date    LDLCALC 94.2 11/10/2017    LDLCALC 110.0 07/07/2017    LDLCALC 194.0 (H) 03/10/2017     Lab Results   Component Value Date    TRIG 84 11/10/2017    TRIG 115 07/07/2017    TRIG 105 03/10/2017     Lab Results   Component Value Date    CHOLHDL 39.3 11/10/2017    CHOLHDL 30.7 07/07/2017    CHOLHDL 21.0 03/10/2017         Lab Results   Component Value Date     11/10/2017    K 4.4 11/10/2017     11/10/2017    CO2 24 11/10/2017    BUN 37 (H) 11/10/2017    CREATININE 1.1 11/10/2017     11/10/2017    HGBA1C 5.5 10/31/2017    MG 1.9 10/26/2015    AST 20 12/15/2015    ALT 17 12/15/2015    ALBUMIN 3.5 12/15/2015    PROT 6.7 12/15/2015    BILITOT 0.3 12/15/2015    WBC 12.46 11/10/2017    HGB 11.9 (L) 11/10/2017    HCT 36.4 (L) 11/10/2017    HCT 40 06/07/2017    MCV 83 11/10/2017     11/10/2017    INR 0.9 10/09/2015    TSH 2.886 10/31/2017    CHOL 183 11/10/2017    HDL 72 11/10/2017    LDLCALC 94.2 11/10/2017    TRIG 84 11/10/2017

## 2018-01-30 ENCOUNTER — TELEPHONE (OUTPATIENT)
Dept: TRANSPLANT | Facility: CLINIC | Age: 83
End: 2018-01-30

## 2018-01-30 NOTE — TELEPHONE ENCOUNTER
"1000 am: Noted message below.    ----- Message from Zee Kramer RN sent at 1/30/2018  9:18 AM CST -----  Hello,    Per Dr. Christianson, this patient can move to HF, "but she won't come back."   He did start her on new meds. He did not want lab follow up, but his note does say the son is supposed to call back.    Thanks,  Taylor    "

## 2018-02-03 DIAGNOSIS — F43.21 GRIEF: ICD-10-CM

## 2018-02-03 DIAGNOSIS — F33.41 MAJOR DEPRESSIVE DISORDER, RECURRENT EPISODE, IN PARTIAL REMISSION: ICD-10-CM

## 2018-02-05 DIAGNOSIS — I10 ESSENTIAL HYPERTENSION: ICD-10-CM

## 2018-02-05 DIAGNOSIS — E78.5 DYSLIPIDEMIA: ICD-10-CM

## 2018-02-05 RX ORDER — CLOPIDOGREL BISULFATE 75 MG/1
75 TABLET ORAL DAILY
Qty: 90 TABLET | Refills: 4 | Status: SHIPPED | OUTPATIENT
Start: 2018-02-05

## 2018-02-20 DIAGNOSIS — R60.9 SWELLING: Primary | ICD-10-CM

## 2018-02-22 ENCOUNTER — HOSPITAL ENCOUNTER (INPATIENT)
Facility: HOSPITAL | Age: 83
LOS: 3 days | Discharge: HOME | DRG: 308 | End: 2018-02-25
Attending: EMERGENCY MEDICINE | Admitting: HOSPITALIST
Payer: MEDICARE

## 2018-02-22 ENCOUNTER — TELEPHONE (OUTPATIENT)
Dept: CARDIOLOGY | Facility: HOSPITAL | Age: 83
End: 2018-02-22

## 2018-02-22 DIAGNOSIS — I48.91 NEW ONSET ATRIAL FIBRILLATION: Primary | ICD-10-CM

## 2018-02-22 DIAGNOSIS — E87.70 HYPERVOLEMIA, UNSPECIFIED HYPERVOLEMIA TYPE: ICD-10-CM

## 2018-02-22 DIAGNOSIS — I48.91 ATRIAL FIBRILLATION: ICD-10-CM

## 2018-02-22 DIAGNOSIS — R00.1 SINUS BRADYCARDIA: ICD-10-CM

## 2018-02-22 PROBLEM — I50.33 ACUTE ON CHRONIC DIASTOLIC (CONGESTIVE) HEART FAILURE: Status: ACTIVE | Noted: 2018-02-22

## 2018-02-22 LAB
ALBUMIN SERPL BCP-MCNC: 3.2 G/DL
ALP SERPL-CCNC: 118 U/L
ALT SERPL W/O P-5'-P-CCNC: 63 U/L
ANION GAP SERPL CALC-SCNC: 10 MMOL/L
AST SERPL-CCNC: 52 U/L
BACTERIA #/AREA URNS AUTO: ABNORMAL /HPF
BASOPHILS # BLD AUTO: 0.04 K/UL
BASOPHILS NFR BLD: 0.4 %
BILIRUB SERPL-MCNC: 0.6 MG/DL
BILIRUB UR QL STRIP: NEGATIVE
BNP SERPL-MCNC: 850 PG/ML
BUN SERPL-MCNC: 33 MG/DL
CALCIUM SERPL-MCNC: 8.9 MG/DL
CHLORIDE SERPL-SCNC: 107 MMOL/L
CLARITY UR REFRACT.AUTO: CLEAR
CO2 SERPL-SCNC: 18 MMOL/L
COLOR UR AUTO: ABNORMAL
CREAT SERPL-MCNC: 1 MG/DL
DIFFERENTIAL METHOD: ABNORMAL
EOSINOPHIL # BLD AUTO: 0.1 K/UL
EOSINOPHIL NFR BLD: 0.7 %
ERYTHROCYTE [DISTWIDTH] IN BLOOD BY AUTOMATED COUNT: 17.6 %
EST. GFR  (AFRICAN AMERICAN): 58.1 ML/MIN/1.73 M^2
EST. GFR  (NON AFRICAN AMERICAN): 50.4 ML/MIN/1.73 M^2
FLUAV AG SPEC QL IA: NEGATIVE
FLUBV AG SPEC QL IA: NEGATIVE
GLUCOSE SERPL-MCNC: 121 MG/DL
GLUCOSE UR QL STRIP: NEGATIVE
HCT VFR BLD AUTO: 34.5 %
HGB BLD-MCNC: 11.2 G/DL
HGB UR QL STRIP: ABNORMAL
HYALINE CASTS UR QL AUTO: 18 /LPF
IMM GRANULOCYTES # BLD AUTO: 0.06 K/UL
IMM GRANULOCYTES NFR BLD AUTO: 0.5 %
INR PPP: 1.1
KETONES UR QL STRIP: NEGATIVE
LEUKOCYTE ESTERASE UR QL STRIP: NEGATIVE
LYMPHOCYTES # BLD AUTO: 3.8 K/UL
LYMPHOCYTES NFR BLD: 33.6 %
MCH RBC QN AUTO: 27.1 PG
MCHC RBC AUTO-ENTMCNC: 32.5 G/DL
MCV RBC AUTO: 83 FL
MICROSCOPIC COMMENT: ABNORMAL
MONOCYTES # BLD AUTO: 0.8 K/UL
MONOCYTES NFR BLD: 6.7 %
NEUTROPHILS # BLD AUTO: 6.6 K/UL
NEUTROPHILS NFR BLD: 58.1 %
NITRITE UR QL STRIP: NEGATIVE
NRBC BLD-RTO: 0 /100 WBC
PH UR STRIP: 5 [PH] (ref 5–8)
PLATELET # BLD AUTO: 320 K/UL
PMV BLD AUTO: 9.5 FL
POTASSIUM SERPL-SCNC: 4 MMOL/L
PROT SERPL-MCNC: 6.4 G/DL
PROT UR QL STRIP: NEGATIVE
PROTHROMBIN TIME: 11.1 SEC
RBC # BLD AUTO: 4.14 M/UL
RBC #/AREA URNS AUTO: 1 /HPF (ref 0–4)
SODIUM SERPL-SCNC: 135 MMOL/L
SP GR UR STRIP: 1 (ref 1–1.03)
SPECIMEN SOURCE: NORMAL
SQUAMOUS #/AREA URNS AUTO: 0 /HPF
TROPONIN I SERPL DL<=0.01 NG/ML-MCNC: 0.01 NG/ML
TSH SERPL DL<=0.005 MIU/L-ACNC: 3.94 UIU/ML
URN SPEC COLLECT METH UR: ABNORMAL
UROBILINOGEN UR STRIP-ACNC: NEGATIVE EU/DL
WBC # BLD AUTO: 11.3 K/UL
WBC #/AREA URNS AUTO: 0 /HPF (ref 0–5)

## 2018-02-22 PROCEDURE — 93010 ELECTROCARDIOGRAM REPORT: CPT | Mod: ,,, | Performed by: INTERNAL MEDICINE

## 2018-02-22 PROCEDURE — 84484 ASSAY OF TROPONIN QUANT: CPT

## 2018-02-22 PROCEDURE — 25000242 PHARM REV CODE 250 ALT 637 W/ HCPCS: Performed by: EMERGENCY MEDICINE

## 2018-02-22 PROCEDURE — 11000001 HC ACUTE MED/SURG PRIVATE ROOM

## 2018-02-22 PROCEDURE — 63600175 PHARM REV CODE 636 W HCPCS: Performed by: EMERGENCY MEDICINE

## 2018-02-22 PROCEDURE — 96374 THER/PROPH/DIAG INJ IV PUSH: CPT

## 2018-02-22 PROCEDURE — 93005 ELECTROCARDIOGRAM TRACING: CPT

## 2018-02-22 PROCEDURE — 99233 SBSQ HOSP IP/OBS HIGH 50: CPT | Mod: GC,,, | Performed by: INTERNAL MEDICINE

## 2018-02-22 PROCEDURE — 80053 COMPREHEN METABOLIC PANEL: CPT

## 2018-02-22 PROCEDURE — 85610 PROTHROMBIN TIME: CPT

## 2018-02-22 PROCEDURE — 25000003 PHARM REV CODE 250: Performed by: HOSPITALIST

## 2018-02-22 PROCEDURE — 99222 1ST HOSP IP/OBS MODERATE 55: CPT | Mod: AI,,, | Performed by: HOSPITALIST

## 2018-02-22 PROCEDURE — 85025 COMPLETE CBC W/AUTO DIFF WBC: CPT

## 2018-02-22 PROCEDURE — 84443 ASSAY THYROID STIM HORMONE: CPT

## 2018-02-22 PROCEDURE — 99285 EMERGENCY DEPT VISIT HI MDM: CPT | Mod: ,,, | Performed by: EMERGENCY MEDICINE

## 2018-02-22 PROCEDURE — 81001 URINALYSIS AUTO W/SCOPE: CPT

## 2018-02-22 PROCEDURE — 25000242 PHARM REV CODE 250 ALT 637 W/ HCPCS: Performed by: HOSPITALIST

## 2018-02-22 PROCEDURE — 94640 AIRWAY INHALATION TREATMENT: CPT

## 2018-02-22 PROCEDURE — 27000221 HC OXYGEN, UP TO 24 HOURS

## 2018-02-22 PROCEDURE — 99285 EMERGENCY DEPT VISIT HI MDM: CPT | Mod: 25

## 2018-02-22 PROCEDURE — 87400 INFLUENZA A/B EACH AG IA: CPT

## 2018-02-22 PROCEDURE — 94761 N-INVAS EAR/PLS OXIMETRY MLT: CPT

## 2018-02-22 PROCEDURE — 63600175 PHARM REV CODE 636 W HCPCS: Performed by: HOSPITALIST

## 2018-02-22 PROCEDURE — 83880 ASSAY OF NATRIURETIC PEPTIDE: CPT

## 2018-02-22 RX ORDER — IPRATROPIUM BROMIDE AND ALBUTEROL SULFATE 2.5; .5 MG/3ML; MG/3ML
3 SOLUTION RESPIRATORY (INHALATION)
Status: COMPLETED | OUTPATIENT
Start: 2018-02-22 | End: 2018-02-22

## 2018-02-22 RX ORDER — AMLODIPINE BESYLATE 5 MG/1
5 TABLET ORAL DAILY
Status: DISCONTINUED | OUTPATIENT
Start: 2018-02-23 | End: 2018-02-25 | Stop reason: HOSPADM

## 2018-02-22 RX ORDER — CAPTOPRIL 50 MG/1
50 TABLET ORAL DAILY
Status: DISCONTINUED | OUTPATIENT
Start: 2018-02-23 | End: 2018-02-25 | Stop reason: HOSPADM

## 2018-02-22 RX ORDER — IPRATROPIUM BROMIDE AND ALBUTEROL SULFATE 2.5; .5 MG/3ML; MG/3ML
3 SOLUTION RESPIRATORY (INHALATION)
Status: DISCONTINUED | OUTPATIENT
Start: 2018-02-22 | End: 2018-02-25 | Stop reason: HOSPADM

## 2018-02-22 RX ORDER — IBUPROFEN 200 MG
24 TABLET ORAL
Status: DISCONTINUED | OUTPATIENT
Start: 2018-02-22 | End: 2018-02-25 | Stop reason: HOSPADM

## 2018-02-22 RX ORDER — CLOPIDOGREL BISULFATE 75 MG/1
75 TABLET ORAL DAILY
Status: DISCONTINUED | OUTPATIENT
Start: 2018-02-23 | End: 2018-02-25 | Stop reason: HOSPADM

## 2018-02-22 RX ORDER — SPIRONOLACTONE 25 MG/1
25 TABLET ORAL DAILY
Status: DISCONTINUED | OUTPATIENT
Start: 2018-02-23 | End: 2018-02-25 | Stop reason: HOSPADM

## 2018-02-22 RX ORDER — LEVOTHYROXINE SODIUM 100 UG/1
100 TABLET ORAL
Status: DISCONTINUED | OUTPATIENT
Start: 2018-02-23 | End: 2018-02-25 | Stop reason: HOSPADM

## 2018-02-22 RX ORDER — GLUCAGON 1 MG
1 KIT INJECTION
Status: DISCONTINUED | OUTPATIENT
Start: 2018-02-22 | End: 2018-02-25 | Stop reason: HOSPADM

## 2018-02-22 RX ORDER — CARVEDILOL 25 MG/1
25 TABLET ORAL 2 TIMES DAILY WITH MEALS
Status: DISCONTINUED | OUTPATIENT
Start: 2018-02-22 | End: 2018-02-24

## 2018-02-22 RX ORDER — SODIUM CHLORIDE 0.9 % (FLUSH) 0.9 %
5 SYRINGE (ML) INJECTION
Status: DISCONTINUED | OUTPATIENT
Start: 2018-02-22 | End: 2018-02-25 | Stop reason: HOSPADM

## 2018-02-22 RX ORDER — PANTOPRAZOLE SODIUM 40 MG/1
40 TABLET, DELAYED RELEASE ORAL DAILY
Status: DISCONTINUED | OUTPATIENT
Start: 2018-02-23 | End: 2018-02-25 | Stop reason: HOSPADM

## 2018-02-22 RX ORDER — FUROSEMIDE 10 MG/ML
20 INJECTION INTRAMUSCULAR; INTRAVENOUS 2 TIMES DAILY
Status: DISCONTINUED | OUTPATIENT
Start: 2018-02-22 | End: 2018-02-24

## 2018-02-22 RX ORDER — ESCITALOPRAM OXALATE 5 MG/1
10 TABLET ORAL DAILY
Status: DISCONTINUED | OUTPATIENT
Start: 2018-02-23 | End: 2018-02-25 | Stop reason: HOSPADM

## 2018-02-22 RX ORDER — RAMELTEON 8 MG/1
8 TABLET ORAL NIGHTLY PRN
Status: DISCONTINUED | OUTPATIENT
Start: 2018-02-22 | End: 2018-02-25 | Stop reason: HOSPADM

## 2018-02-22 RX ORDER — AMOXICILLIN 250 MG
1 CAPSULE ORAL 2 TIMES DAILY
Status: DISCONTINUED | OUTPATIENT
Start: 2018-02-22 | End: 2018-02-25 | Stop reason: HOSPADM

## 2018-02-22 RX ORDER — FUROSEMIDE 10 MG/ML
20 INJECTION INTRAMUSCULAR; INTRAVENOUS
Status: COMPLETED | OUTPATIENT
Start: 2018-02-22 | End: 2018-02-22

## 2018-02-22 RX ORDER — NITROGLYCERIN 0.4 MG/1
0.4 TABLET SUBLINGUAL EVERY 5 MIN PRN
Status: DISCONTINUED | OUTPATIENT
Start: 2018-02-22 | End: 2018-02-25 | Stop reason: HOSPADM

## 2018-02-22 RX ORDER — ACETAMINOPHEN 500 MG
1000 TABLET ORAL EVERY 8 HOURS PRN
Status: DISCONTINUED | OUTPATIENT
Start: 2018-02-22 | End: 2018-02-25 | Stop reason: HOSPADM

## 2018-02-22 RX ORDER — ATORVASTATIN CALCIUM 20 MG/1
80 TABLET, FILM COATED ORAL DAILY
Status: DISCONTINUED | OUTPATIENT
Start: 2018-02-23 | End: 2018-02-23

## 2018-02-22 RX ORDER — IBUPROFEN 200 MG
16 TABLET ORAL
Status: DISCONTINUED | OUTPATIENT
Start: 2018-02-22 | End: 2018-02-25 | Stop reason: HOSPADM

## 2018-02-22 RX ADMIN — APIXABAN 5 MG: 5 TABLET, FILM COATED ORAL at 08:02

## 2018-02-22 RX ADMIN — CARVEDILOL 25 MG: 25 TABLET, FILM COATED ORAL at 08:02

## 2018-02-22 RX ADMIN — FUROSEMIDE 20 MG: 10 INJECTION, SOLUTION INTRAMUSCULAR; INTRAVENOUS at 02:02

## 2018-02-22 RX ADMIN — STANDARDIZED SENNA CONCENTRATE AND DOCUSATE SODIUM 1 TABLET: 8.6; 5 TABLET, FILM COATED ORAL at 08:02

## 2018-02-22 RX ADMIN — FUROSEMIDE 20 MG: 10 INJECTION, SOLUTION INTRAMUSCULAR; INTRAVENOUS at 08:02

## 2018-02-22 RX ADMIN — IPRATROPIUM BROMIDE AND ALBUTEROL SULFATE 3 ML: .5; 3 SOLUTION RESPIRATORY (INHALATION) at 12:02

## 2018-02-22 RX ADMIN — IPRATROPIUM BROMIDE AND ALBUTEROL SULFATE 3 ML: .5; 3 SOLUTION RESPIRATORY (INHALATION) at 07:02

## 2018-02-22 NOTE — PROVIDER PROGRESS NOTES - EMERGENCY DEPT.
Encounter Date: 2/22/2018    ED Physician Progress Notes         EKG - STEMI Decision  Initial Reading: No STEMI present.

## 2018-02-22 NOTE — ASSESSMENT & PLAN NOTE
- Not previously on any lasix and has respodned well to just 20mg IV in ED  - Would cont Lasix 20mg IV BID and follow response as she is signigianlty overloaded and requirng supplemental O2  - Likely precipitated by dietary indiscretions, recent URI, along with new onset Afib  - Cont Aldactone and Norvasc for control of BP  - Educate on salt and fluid restricion

## 2018-02-22 NOTE — HPI
89 y/o Female with hx of HTN, HLD, HFpEF, CAD s/p PCI with SKYLAR to mLAD 8/2015, PCI LCX and D1 2001, PAD s/p PTA with DCB L SFA 2015 here for worsening SOB over last 2 days. Pt notes SOB with just getting dressed today. Over last few months has noticed more LOYOLA with walking around house and ADL's. She lives alone and has aids that help with ADL's. She was found to be in Afib with HR in 50-60's today. Cardiology consults would like to perform a NISHI/DCCV. She is currently on eliquis for A/C.    Dysphagia or odynophagia:  NO  Liver Disease, esophageal disease, or known varices:  YES  +1. (History of esophageal stricture about 30cm from the incisors that was last dilated 2015. No issues swallowing since. Reported tortuous lower 1/3 of the esophagus. Her son states that she has had her stomach stapled to the anterior abdominal wall(?) although no records support this.)  Upper GI Bleeding: NO (history of gastric ulcer that was clipped in 2015. No bleeding recently)  Snoring:  NO  Sleep Apnea:  NO  Prior neck surgery or radiation:  NO  Able to move neck in all directions:  YES  +1  History of anesthetic difficulties:  NO  Family history of anesthetic difficulties:  NO  Last oral intake:  02/23/2018 at 00:01    Mallampati Class:  3  ASA Score:  2

## 2018-02-22 NOTE — ED TRIAGE NOTES
Pt presents with c/o np cough over the last 4-5 days. Shortness of breath and audible wheezing noted by son this am. Pt started on tamiflu 2 days ago and z-lisa yesterday. Pt denies pain.

## 2018-02-22 NOTE — SUBJECTIVE & OBJECTIVE
Past Medical History:   Diagnosis Date    Aortic valve disorders     Arthritis     Benign neoplasm of breast     Chest pain, unspecified     CHF (congestive heart failure)     Coronary artery disease     Depression     Diaphragmatic hernia without mention of obstruction or gangrene     Duodenal ulcer, unspecified as acute or chronic, without hemorrhage, perforation, or obstruction     Gastric ulcer, unspecified as acute or chronic, without mention of hemorrhage, perforation, or obstruction     GERD (gastroesophageal reflux disease)     Headache     Heart attack     History of migraine     HLD (hyperlipidemia)     HTN (hypertension)     Memory loss     Microscopic hematuria     Old myocardial infarction     Osteoporosis, unspecified     Personal history of colonic polyps     Primary pulmonary hypertension     Psychiatric problem     PTSD (post-traumatic stress disorder)     Rosacea     Stricture and stenosis of esophagus     Therapy     Unspecified hypothyroidism     Volvulus        Past Surgical History:   Procedure Laterality Date    ANGIOPLASTY      APPENDECTOMY      Open    BREAST BIOPSY      CATARACT EXTRACTION, BILATERAL      COLONOSCOPY W/ POLYPECTOMY      CORONARY ANGIOPLASTY      s/p stents    ESOPHAGOGASTRODUODENOSCOPY      TONSILLECTOMY, ADENOIDECTOMY      TOTAL ABDOMINAL HYSTERECTOMY      rso, endometriosis       Review of patient's allergies indicates:   Allergen Reactions    Sulfa (sulfonamide antibiotics) Anaphylaxis       No current facility-administered medications on file prior to encounter.      Current Outpatient Prescriptions on File Prior to Encounter   Medication Sig    acetaminophen (TYLENOL) 80 MG Chew Take 500 mg by mouth as needed.    amlodipine (NORVASC) 5 MG tablet Take 1 tablet (5 mg total) by mouth once daily.    atorvastatin (LIPITOR) 20 MG tablet Take 4 tablets (80 mg total) by mouth once daily.    captopril (CAPOTEN) 50 MG tablet Take 50 mg  by mouth once daily.    carvedilol (COREG) 25 MG tablet Take 25 mg by mouth 2 (two) times daily with meals.    clopidogrel (PLAVIX) 75 mg tablet Take 1 tablet (75 mg total) by mouth once daily.    escitalopram oxalate (LEXAPRO) 10 MG tablet     estrogens, conjugated, (PREMARIN) 0.3 MG tablet Take 1 tablet (0.3 mg total) by mouth every evening.    levothyroxine (SYNTHROID) 100 MCG tablet TAKE 1 TABLET(100 MCG) BY MOUTH EVERY DAY    nitroGLYCERIN (NITROSTAT) 0.4 MG SL tablet Place 1 tablet (0.4 mg total) under the tongue every 5 (five) minutes as needed for Chest pain.    spironolactone (ALDACTONE) 25 MG tablet     VIRT-MICHAEL FORTE 2.5-25-2 mg Tab TAKE 1 TABLET BY MOUTH EVERY DAY    FLUZONE HIGH-DOSE 2017-18, PF, 180 mcg/0.5 mL vaccine ADMINISTER 0.5ML IN THE MUSCLE AS DIRECTED    omeprazole (PRILOSEC) 20 MG capsule Take 1 capsule (20 mg total) by mouth 2 (two) times daily. (Patient taking differently: Take 20 mg by mouth once daily. )     Family History     Problem Relation (Age of Onset)    Breast cancer Sister    Heart attack Father    Heart disease Father    Heart failure Mother, Father    Hypertension Father    No Known Problems Maternal Grandfather, Paternal Grandmother, Paternal Grandfather, Maternal Aunt, Maternal Uncle, Paternal Aunt, Paternal Uncle        Social History Main Topics    Smoking status: Never Smoker    Smokeless tobacco: Never Used    Alcohol use 0.6 oz/week     1 Glasses of wine per week      Comment: wine - nightly    Drug use: No    Sexual activity: Not Currently     Review of Systems   Constitution: Negative for chills and fever.   HENT: Negative for hoarse voice and sore throat.    Eyes: Negative for pain and redness.   Cardiovascular: Positive for dyspnea on exertion and leg swelling. Negative for chest pain and orthopnea.   Respiratory: Positive for shortness of breath. Negative for cough.    Endocrine: Negative for polydipsia and polyuria.   Hematologic/Lymphatic: Negative  for bleeding problem. Does not bruise/bleed easily.   Skin: Negative for flushing and itching.   Musculoskeletal: Negative for joint pain and joint swelling.   Gastrointestinal: Negative for hematemesis, hematochezia and melena.   Genitourinary: Negative for dysuria and hematuria.   Neurological: Negative for light-headedness and loss of balance.     Objective:     Vital Signs (Most Recent):  Temp: 96.8 °F (36 °C) (02/22/18 1438)  Pulse: 60 (02/22/18 1438)  Resp: 18 (02/22/18 1438)  BP: (!) 154/66 (02/22/18 1438)  SpO2: (!) 92 % (02/22/18 1438) Vital Signs (24h Range):  Temp:  [96.8 °F (36 °C)-97.7 °F (36.5 °C)] 96.8 °F (36 °C)  Pulse:  [56-68] 60  Resp:  [18-20] 18  SpO2:  [90 %-95 %] 92 %  BP: (100-154)/(57-66) 154/66     Weight: 72 kg (158 lb 11.7 oz)  Body mass index is 27.25 kg/m².    SpO2: (!) 92 %  O2 Device (Oxygen Therapy): nasal cannula      Intake/Output Summary (Last 24 hours) at 02/22/18 1508  Last data filed at 02/22/18 1502   Gross per 24 hour   Intake                0 ml   Output              125 ml   Net             -125 ml       Lines/Drains/Airways     Peripheral Intravenous Line                 Peripheral IV - Single Lumen 02/22/18 1245 Forearm less than 1 day                Physical Exam   Constitutional: She is oriented to person, place, and time. She appears well-developed and well-nourished.   HENT:   Head: Normocephalic and atraumatic.   Mouth/Throat: No oropharyngeal exudate.   Eyes: EOM are normal. Pupils are equal, round, and reactive to light.   Neck: Normal range of motion. Neck supple. JVD present.   Cardiovascular: Normal rate and regular rhythm.  Exam reveals no friction rub.    Murmur heard.   Midsystolic murmur is present with a grade of 2/6  at the lower left sternal border  Pulmonary/Chest: Effort normal and breath sounds normal. No respiratory distress.   Abdominal: Soft. Bowel sounds are normal. She exhibits no distension.   Musculoskeletal: Normal range of motion. She exhibits  edema.   Neurological: She is alert and oriented to person, place, and time.   Skin: Skin is warm. No rash noted.       Significant Labs: All pertinent lab results from the last 24 hours have been reviewed.    Significant Imaging: EKG: Afib with slow ventricular response and RAD with IVCD

## 2018-02-22 NOTE — CONSULTS
[facility]  Cardiology  Consult Note    Patient Name: Symone Lloyd  MRN: [id number]  Admission Date: 2/22/2018  Hospital Length of Stay: 0 days  Code Status: Prior   Attending Provider: Tita Fisher MD   Consulting Provider: Satnam Mijares MD  Primary Care Physician: Amanda Mcdonald MD  Principal Problem:<principal problem not specified>    Patient information was obtained from patient and ER records.     Inpatient consult to Cardiology  Consult performed by: SATNAM MIJARES.  Consult ordered by: TITA FISHER        Subjective:     Chief Complaint:  SOB     HPI:   87 y/o Female with hx of HTN, HLD, HFpEF, CAD s/p PCI with SKYLAR to mLAD 8/2015, PCI LCX and D1 2001, PAD s/p PTA with DCB L SFA 2015 here for worsening SOB over last 2 days. Pt notes SOB with just getting dressed today. Over last few months has noticed more LOYOLA with walking around house and ADL's. She lives alone and has aids that help with ADL's. She was found to be in Afib with HR in 50-60's today. spO2 92% on 2L. BNP elevated up to 850 and AST/ALT mildly elevated. Pt with LE and JVP on exam. CXR with pulmonary edema. Not on diuretic at home and has responded well to 20mg IV lasix in ED. She is followed by Dr. Sung and was referred to Dr. Christianson on 1/29 for worsening SOB, HFpEF and elevated PAP. He increased Norvasc and decreased coreg as pt had HR in 50's and 's. Son is a physician and he monitored symptoms but did not note improvement with changes and mild increase in HR but significant rise in BP so went back to original regimen. She has no hx of Afib. She is not feeling palps, dizziness or syncope. No bleeding issues and just taking Plavix not ASA.    Past Medical History:   Diagnosis Date    Aortic valve disorders     Arthritis     Benign neoplasm of breast     Chest pain, unspecified     CHF (congestive heart failure)     Coronary artery disease     Depression     Diaphragmatic hernia without mention of  obstruction or gangrene     Duodenal ulcer, unspecified as acute or chronic, without hemorrhage, perforation, or obstruction     Gastric ulcer, unspecified as acute or chronic, without mention of hemorrhage, perforation, or obstruction     GERD (gastroesophageal reflux disease)     Headache     Heart attack     History of migraine     HLD (hyperlipidemia)     HTN (hypertension)     Memory loss     Microscopic hematuria     Old myocardial infarction     Osteoporosis, unspecified     Personal history of colonic polyps     Primary pulmonary hypertension     Psychiatric problem     PTSD (post-traumatic stress disorder)     Rosacea     Stricture and stenosis of esophagus     Therapy     Unspecified hypothyroidism     Volvulus        Past Surgical History:   Procedure Laterality Date    ANGIOPLASTY      APPENDECTOMY      Open    BREAST BIOPSY      CATARACT EXTRACTION, BILATERAL      COLONOSCOPY W/ POLYPECTOMY      CORONARY ANGIOPLASTY      s/p stents    ESOPHAGOGASTRODUODENOSCOPY      TONSILLECTOMY, ADENOIDECTOMY      TOTAL ABDOMINAL HYSTERECTOMY      rso, endometriosis       Review of patient's allergies indicates:   Allergen Reactions    Sulfa (sulfonamide antibiotics) Anaphylaxis       No current facility-administered medications on file prior to encounter.      Current Outpatient Prescriptions on File Prior to Encounter   Medication Sig    acetaminophen (TYLENOL) 80 MG Chew Take 500 mg by mouth as needed.    amlodipine (NORVASC) 5 MG tablet Take 1 tablet (5 mg total) by mouth once daily.    atorvastatin (LIPITOR) 20 MG tablet Take 4 tablets (80 mg total) by mouth once daily.    captopril (CAPOTEN) 50 MG tablet Take 50 mg by mouth once daily.    carvedilol (COREG) 25 MG tablet Take 25 mg by mouth 2 (two) times daily with meals.    clopidogrel (PLAVIX) 75 mg tablet Take 1 tablet (75 mg total) by mouth once daily.    escitalopram oxalate (LEXAPRO) 10 MG tablet     estrogens,  conjugated, (PREMARIN) 0.3 MG tablet Take 1 tablet (0.3 mg total) by mouth every evening.    levothyroxine (SYNTHROID) 100 MCG tablet TAKE 1 TABLET(100 MCG) BY MOUTH EVERY DAY    nitroGLYCERIN (NITROSTAT) 0.4 MG SL tablet Place 1 tablet (0.4 mg total) under the tongue every 5 (five) minutes as needed for Chest pain.    spironolactone (ALDACTONE) 25 MG tablet     VIRT-MICHAEL FORTE 2.5-25-2 mg Tab TAKE 1 TABLET BY MOUTH EVERY DAY    FLUZONE HIGH-DOSE 2017-18, PF, 180 mcg/0.5 mL vaccine ADMINISTER 0.5ML IN THE MUSCLE AS DIRECTED    omeprazole (PRILOSEC) 20 MG capsule Take 1 capsule (20 mg total) by mouth 2 (two) times daily. (Patient taking differently: Take 20 mg by mouth once daily. )     Family History     Problem Relation (Age of Onset)    Breast cancer Sister    Heart attack Father    Heart disease Father    Heart failure Mother, Father    Hypertension Father    No Known Problems Maternal Grandfather, Paternal Grandmother, Paternal Grandfather, Maternal Aunt, Maternal Uncle, Paternal Aunt, Paternal Uncle        Social History Main Topics    Smoking status: Never Smoker    Smokeless tobacco: Never Used    Alcohol use 0.6 oz/week     1 Glasses of wine per week      Comment: wine - nightly    Drug use: No    Sexual activity: Not Currently     Review of Systems   Constitution: Negative for chills and fever.   HENT: Negative for hoarse voice and sore throat.    Eyes: Negative for pain and redness.   Cardiovascular: Positive for dyspnea on exertion and leg swelling. Negative for chest pain and orthopnea.   Respiratory: Positive for shortness of breath. Negative for cough.    Endocrine: Negative for polydipsia and polyuria.   Hematologic/Lymphatic: Negative for bleeding problem. Does not bruise/bleed easily.   Skin: Negative for flushing and itching.   Musculoskeletal: Negative for joint pain and joint swelling.   Gastrointestinal: Negative for hematemesis, hematochezia and melena.   Genitourinary: Negative for  dysuria and hematuria.   Neurological: Negative for light-headedness and loss of balance.     Objective:     Vital Signs (Most Recent):  Temp: 96.8 °F (36 °C) (02/22/18 1438)  Pulse: 60 (02/22/18 1438)  Resp: 18 (02/22/18 1438)  BP: (!) 154/66 (02/22/18 1438)  SpO2: (!) 92 % (02/22/18 1438) Vital Signs (24h Range):  Temp:  [96.8 °F (36 °C)-97.7 °F (36.5 °C)] 96.8 °F (36 °C)  Pulse:  [56-68] 60  Resp:  [18-20] 18  SpO2:  [90 %-95 %] 92 %  BP: (100-154)/(57-66) 154/66     Weight: 72 kg (158 lb 11.7 oz)  Body mass index is 27.25 kg/m².    SpO2: (!) 92 %  O2 Device (Oxygen Therapy): nasal cannula      Intake/Output Summary (Last 24 hours) at 02/22/18 1508  Last data filed at 02/22/18 1502   Gross per 24 hour   Intake                0 ml   Output              125 ml   Net             -125 ml       Lines/Drains/Airways     Peripheral Intravenous Line                 Peripheral IV - Single Lumen 02/22/18 1245 Forearm less than 1 day                Physical Exam   Constitutional: She is oriented to person, place, and time. She appears well-developed and well-nourished.   HENT:   Head: Normocephalic and atraumatic.   Mouth/Throat: No oropharyngeal exudate.   Eyes: EOM are normal. Pupils are equal, round, and reactive to light.   Neck: Normal range of motion. Neck supple. JVD present.   Cardiovascular: Normal rate and regular rhythm.  Exam reveals no friction rub.    Murmur heard.   Midsystolic murmur is present with a grade of 2/6  at the lower left sternal border  Pulmonary/Chest: Effort normal and breath sounds normal. No respiratory distress.   Abdominal: Soft. Bowel sounds are normal. She exhibits no distension.   Musculoskeletal: Normal range of motion. She exhibits edema.   Neurological: She is alert and oriented to person, place, and time.   Skin: Skin is warm. No rash noted.       Significant Labs: All pertinent lab results from the last 24 hours have been reviewed.    Significant Imaging: EKG: Afib with slow  ventricular response and RAD with IVCD    Assessment and Plan:     A-fib    - New Onset per review of EKG's and pt denies any previous hx of Afib  - She is well rate controlled on current dose of Coreg  - Discussed rate vs rhythm control strategy with pt and son and pt would prefer rate control at this time  - If she doesn't self convert with IV diuresis or is still symptomatic after she is euvolemic and remains in Afib can discuss further rhythm control strategies  - Her [id number]. Discussed risk vs benefits of stroke prevention with pt and would start eliquis 5mg BID. Would cont Plavix 75mg QD given hx of CAD and multiple stents. She is not on ASA and this is fine as we do not want her on triple therapy given bleeding risk.        Acute on chronic diastolic (congestive) heart failure    - Not previously on any lasix and has respodned well to just 20mg IV in ED  - Would cont Lasix 20mg IV BID and follow response as she is signigianlty overloaded and requirng supplemental O2  - Likely precipitated by dietary indiscretions, recent URI, along with new onset Afib. She also has mod-sev TR  - Cont Aldactone and Norvasc for control of BP  - Educate on salt and fluid restricion          Admit to Internal Medicine and Cardiology Consult Service will follow along.    Thank you for your consult.     Satnam Mijares MD  Cardiology   [facility]

## 2018-02-22 NOTE — ASSESSMENT & PLAN NOTE
- New Onset per review of EKG's and pt denies any previous hx of Afib  - She is well rate controlled on current dose of Coreg  - Discussed rate vs rhythm control strategy with pt and son and pt would prefer rate control at this time  - If she doesn't self convert with IV diuresis or is still symptomatic after she is euvolemic and in Afib can discuss further rhythm control strategies  - Her [id number]. Discussed risk vs benefits of stroke prevention with pt and will start eliquis 5mg BID. Would cont Plavix 75mg QD given hx of CAD and multiple stents. She is not on ASA and this is fine as we do not want her on triple therapy given bleeding risk

## 2018-02-22 NOTE — ED NOTES
.  Patient identifiers for Symone Lloyd 88 y.o. female checked and correct.  Chief Complaint   Patient presents with    Shortness of Breath     Pt c/o SOB & dizziness.  Onset last night.  Pt denies chest pain.      Past Medical History:   Diagnosis Date    Aortic valve disorders     Arthritis     Benign neoplasm of breast     Chest pain, unspecified     CHF (congestive heart failure)     Coronary artery disease     Depression     Diaphragmatic hernia without mention of obstruction or gangrene     Duodenal ulcer, unspecified as acute or chronic, without hemorrhage, perforation, or obstruction     Gastric ulcer, unspecified as acute or chronic, without mention of hemorrhage, perforation, or obstruction     GERD (gastroesophageal reflux disease)     Headache     Heart attack     History of migraine     HLD (hyperlipidemia)     HTN (hypertension)     Memory loss     Microscopic hematuria     Old myocardial infarction     Osteoporosis, unspecified     Personal history of colonic polyps     Primary pulmonary hypertension     Psychiatric problem     PTSD (post-traumatic stress disorder)     Rosacea     Stricture and stenosis of esophagus     Therapy     Unspecified hypothyroidism     Volvulus      Allergies reported:   Review of patient's allergies indicates:   Allergen Reactions    Sulfa (sulfonamide antibiotics) Anaphylaxis         LOC: Patient is awake, alert, and aware of environment with an appropriate affect. Patient is oriented x 3 and speaking appropriately.  APPEARANCE: Patient resting comfortably and in no acute distress. Patient is clean and well groomed, patient's clothing is properly fastened.  SKIN: The skin is warm and dry. Patient has normal skin turgor and moist mucus membranes. Skin is intact; no bruising or breakdown noted.  MUSKULOSKELETAL: Patient is moving all extremities well, no obvious deformities noted. Pulses intact.   RESPIRATORY: Airway is open and patent.  Respirations are spontaneous and non-labored with normal effort and rate, BBS=clear  CARDIAC: Patient has a normal rate and rhythm. ** on cardiac monitor,No peripheral edema noted.   ABDOMEN: No distention noted. Bowel sounds active in all 4 quadrants. Soft and non-tender upon palpation.  NEUROLOGICAL: pupils **mm, PERRL. Facial expression is symmetrical. Hand grasps are equal bilaterally. Normal sensation in all extremities when touched with finger.

## 2018-02-23 ENCOUNTER — ANESTHESIA EVENT (OUTPATIENT)
Dept: MEDSURG UNIT | Facility: HOSPITAL | Age: 83
DRG: 308 | End: 2018-02-23
Payer: MEDICARE

## 2018-02-23 ENCOUNTER — ANESTHESIA (OUTPATIENT)
Dept: MEDSURG UNIT | Facility: HOSPITAL | Age: 83
End: 2018-02-23

## 2018-02-23 ENCOUNTER — ANESTHESIA EVENT (OUTPATIENT)
Dept: MEDSURG UNIT | Facility: HOSPITAL | Age: 83
End: 2018-02-23

## 2018-02-23 ENCOUNTER — ANESTHESIA (OUTPATIENT)
Dept: MEDSURG UNIT | Facility: HOSPITAL | Age: 83
DRG: 308 | End: 2018-02-23
Payer: MEDICARE

## 2018-02-23 LAB
ALBUMIN SERPL BCP-MCNC: 2.8 G/DL
ALP SERPL-CCNC: 99 U/L
ALT SERPL W/O P-5'-P-CCNC: 56 U/L
ANION GAP SERPL CALC-SCNC: 10 MMOL/L
ANION GAP SERPL CALC-SCNC: 11 MMOL/L
AST SERPL-CCNC: 40 U/L
BASOPHILS # BLD AUTO: 0.03 K/UL
BASOPHILS NFR BLD: 0.3 %
BILIRUB SERPL-MCNC: 0.7 MG/DL
BUN SERPL-MCNC: 29 MG/DL
BUN SERPL-MCNC: 32 MG/DL
CALCIUM SERPL-MCNC: 8.6 MG/DL
CALCIUM SERPL-MCNC: 9.1 MG/DL
CHLORIDE SERPL-SCNC: 103 MMOL/L
CHLORIDE SERPL-SCNC: 104 MMOL/L
CO2 SERPL-SCNC: 23 MMOL/L
CO2 SERPL-SCNC: 24 MMOL/L
CREAT SERPL-MCNC: 1.1 MG/DL
CREAT SERPL-MCNC: 1.1 MG/DL
DIFFERENTIAL METHOD: ABNORMAL
EOSINOPHIL # BLD AUTO: 0.2 K/UL
EOSINOPHIL NFR BLD: 1.7 %
ERYTHROCYTE [DISTWIDTH] IN BLOOD BY AUTOMATED COUNT: 17.6 %
EST. GFR  (AFRICAN AMERICAN): 51.8 ML/MIN/1.73 M^2
EST. GFR  (AFRICAN AMERICAN): 51.8 ML/MIN/1.73 M^2
EST. GFR  (NON AFRICAN AMERICAN): 44.9 ML/MIN/1.73 M^2
EST. GFR  (NON AFRICAN AMERICAN): 44.9 ML/MIN/1.73 M^2
GLUCOSE SERPL-MCNC: 100 MG/DL
GLUCOSE SERPL-MCNC: 112 MG/DL
HCT VFR BLD AUTO: 31.2 %
HGB BLD-MCNC: 10.3 G/DL
IMM GRANULOCYTES # BLD AUTO: 0.03 K/UL
IMM GRANULOCYTES NFR BLD AUTO: 0.3 %
LYMPHOCYTES # BLD AUTO: 2.7 K/UL
LYMPHOCYTES NFR BLD: 31.6 %
MAGNESIUM SERPL-MCNC: 1.4 MG/DL
MCH RBC QN AUTO: 27.2 PG
MCHC RBC AUTO-ENTMCNC: 33 G/DL
MCV RBC AUTO: 83 FL
MITRAL VALVE MOBILITY: NORMAL
MITRAL VALVE REGURGITATION: ABNORMAL
MONOCYTES # BLD AUTO: 0.8 K/UL
MONOCYTES NFR BLD: 9.6 %
NEUTROPHILS # BLD AUTO: 4.9 K/UL
NEUTROPHILS NFR BLD: 56.5 %
NRBC BLD-RTO: 0 /100 WBC
PLATELET # BLD AUTO: 275 K/UL
PMV BLD AUTO: 10 FL
POCT GLUCOSE: 112 MG/DL (ref 70–110)
POTASSIUM SERPL-SCNC: 3.6 MMOL/L
POTASSIUM SERPL-SCNC: 3.8 MMOL/L
PROT SERPL-MCNC: 5.6 G/DL
RBC # BLD AUTO: 3.78 M/UL
RETIRED EF AND QEF - SEE NOTES: 55 (ref 55–65)
SODIUM SERPL-SCNC: 137 MMOL/L
SODIUM SERPL-SCNC: 138 MMOL/L
TRICUSPID VALVE REGURGITATION: ABNORMAL
WBC # BLD AUTO: 8.63 K/UL

## 2018-02-23 PROCEDURE — D9220A PRA ANESTHESIA: Mod: CRNA,,, | Performed by: NURSE ANESTHETIST, CERTIFIED REGISTERED

## 2018-02-23 PROCEDURE — 25000003 PHARM REV CODE 250: Performed by: HOSPITALIST

## 2018-02-23 PROCEDURE — 92960 CARDIOVERSION ELECTRIC EXT: CPT | Mod: ,,, | Performed by: INTERNAL MEDICINE

## 2018-02-23 PROCEDURE — 80048 BASIC METABOLIC PNL TOTAL CA: CPT

## 2018-02-23 PROCEDURE — 36415 COLL VENOUS BLD VENIPUNCTURE: CPT

## 2018-02-23 PROCEDURE — 94640 AIRWAY INHALATION TREATMENT: CPT

## 2018-02-23 PROCEDURE — 5A2204Z RESTORATION OF CARDIAC RHYTHM, SINGLE: ICD-10-PCS | Performed by: INTERNAL MEDICINE

## 2018-02-23 PROCEDURE — 93005 ELECTROCARDIOGRAM TRACING: CPT

## 2018-02-23 PROCEDURE — 25000003 PHARM REV CODE 250: Performed by: NURSE ANESTHETIST, CERTIFIED REGISTERED

## 2018-02-23 PROCEDURE — 93312 ECHO TRANSESOPHAGEAL: CPT | Mod: 26,,, | Performed by: INTERNAL MEDICINE

## 2018-02-23 PROCEDURE — 63600175 PHARM REV CODE 636 W HCPCS: Performed by: HOSPITALIST

## 2018-02-23 PROCEDURE — 80053 COMPREHEN METABOLIC PANEL: CPT

## 2018-02-23 PROCEDURE — 93325 DOPPLER ECHO COLOR FLOW MAPG: CPT | Mod: 26,,, | Performed by: INTERNAL MEDICINE

## 2018-02-23 PROCEDURE — 82962 GLUCOSE BLOOD TEST: CPT | Performed by: INTERNAL MEDICINE

## 2018-02-23 PROCEDURE — 93010 ELECTROCARDIOGRAM REPORT: CPT | Mod: ,,, | Performed by: INTERNAL MEDICINE

## 2018-02-23 PROCEDURE — 11000001 HC ACUTE MED/SURG PRIVATE ROOM

## 2018-02-23 PROCEDURE — 25000242 PHARM REV CODE 250 ALT 637 W/ HCPCS: Performed by: HOSPITALIST

## 2018-02-23 PROCEDURE — 82962 GLUCOSE BLOOD TEST: CPT

## 2018-02-23 PROCEDURE — 93325 DOPPLER ECHO COLOR FLOW MAPG: CPT

## 2018-02-23 PROCEDURE — 83735 ASSAY OF MAGNESIUM: CPT

## 2018-02-23 PROCEDURE — 27100006 CARDIOVERSION (DCCV)

## 2018-02-23 PROCEDURE — D9220A PRA ANESTHESIA: Mod: ANES,,, | Performed by: ANESTHESIOLOGY

## 2018-02-23 PROCEDURE — 37000009 HC ANESTHESIA EA ADD 15 MINS

## 2018-02-23 PROCEDURE — 25000003 PHARM REV CODE 250: Performed by: STUDENT IN AN ORGANIZED HEALTH CARE EDUCATION/TRAINING PROGRAM

## 2018-02-23 PROCEDURE — 93320 DOPPLER ECHO COMPLETE: CPT | Mod: 26,,, | Performed by: INTERNAL MEDICINE

## 2018-02-23 PROCEDURE — 63600175 PHARM REV CODE 636 W HCPCS: Performed by: STUDENT IN AN ORGANIZED HEALTH CARE EDUCATION/TRAINING PROGRAM

## 2018-02-23 PROCEDURE — 99232 SBSQ HOSP IP/OBS MODERATE 35: CPT | Mod: ,,, | Performed by: HOSPITALIST

## 2018-02-23 PROCEDURE — 63600175 PHARM REV CODE 636 W HCPCS: Performed by: NURSE ANESTHETIST, CERTIFIED REGISTERED

## 2018-02-23 PROCEDURE — 85025 COMPLETE CBC W/AUTO DIFF WBC: CPT

## 2018-02-23 PROCEDURE — 37000008 HC ANESTHESIA 1ST 15 MINUTES

## 2018-02-23 RX ORDER — SODIUM CHLORIDE 0.9 % (FLUSH) 0.9 %
3 SYRINGE (ML) INJECTION
Status: DISCONTINUED | OUTPATIENT
Start: 2018-02-23 | End: 2018-02-25 | Stop reason: HOSPADM

## 2018-02-23 RX ORDER — PROPOFOL 10 MG/ML
VIAL (ML) INTRAVENOUS CONTINUOUS PRN
Status: DISCONTINUED | OUTPATIENT
Start: 2018-02-23 | End: 2018-02-23

## 2018-02-23 RX ORDER — ATROPINE SULFATE 0.4 MG/ML
INJECTION, SOLUTION ENDOTRACHEAL; INTRAMEDULLARY; INTRAMUSCULAR; INTRAVENOUS; SUBCUTANEOUS
Status: DISCONTINUED | OUTPATIENT
Start: 2018-02-23 | End: 2018-02-23

## 2018-02-23 RX ORDER — POTASSIUM CHLORIDE 20 MEQ/1
40 TABLET, EXTENDED RELEASE ORAL ONCE
Status: COMPLETED | OUTPATIENT
Start: 2018-02-23 | End: 2018-02-23

## 2018-02-23 RX ORDER — LANOLIN ALCOHOL/MO/W.PET/CERES
400 CREAM (GRAM) TOPICAL ONCE
Status: COMPLETED | OUTPATIENT
Start: 2018-02-23 | End: 2018-02-23

## 2018-02-23 RX ORDER — ATORVASTATIN CALCIUM 20 MG/1
20 TABLET, FILM COATED ORAL DAILY
Status: DISCONTINUED | OUTPATIENT
Start: 2018-02-23 | End: 2018-02-25 | Stop reason: HOSPADM

## 2018-02-23 RX ORDER — SODIUM CHLORIDE 9 MG/ML
INJECTION, SOLUTION INTRAVENOUS CONTINUOUS PRN
Status: DISCONTINUED | OUTPATIENT
Start: 2018-02-23 | End: 2018-02-23

## 2018-02-23 RX ORDER — LIDOCAINE HYDROCHLORIDE 20 MG/ML
SOLUTION OROPHARYNGEAL
Status: DISCONTINUED | OUTPATIENT
Start: 2018-02-23 | End: 2018-02-23

## 2018-02-23 RX ORDER — FENTANYL CITRATE 50 UG/ML
INJECTION, SOLUTION INTRAMUSCULAR; INTRAVENOUS
Status: DISCONTINUED | OUTPATIENT
Start: 2018-02-23 | End: 2018-02-23

## 2018-02-23 RX ORDER — FUROSEMIDE 10 MG/ML
20 INJECTION INTRAMUSCULAR; INTRAVENOUS ONCE
Status: COMPLETED | OUTPATIENT
Start: 2018-02-23 | End: 2018-02-23

## 2018-02-23 RX ORDER — PROPOFOL 10 MG/ML
VIAL (ML) INTRAVENOUS
Status: DISCONTINUED | OUTPATIENT
Start: 2018-02-23 | End: 2018-02-23

## 2018-02-23 RX ADMIN — SPIRONOLACTONE 25 MG: 25 TABLET, FILM COATED ORAL at 09:02

## 2018-02-23 RX ADMIN — POTASSIUM CHLORIDE 40 MEQ: 1500 TABLET, EXTENDED RELEASE ORAL at 08:02

## 2018-02-23 RX ADMIN — ATROPINE SULFATE 0.2 MG: 0.4 INJECTION, SOLUTION INTRAMUSCULAR; INTRAVENOUS; SUBCUTANEOUS at 03:02

## 2018-02-23 RX ADMIN — CARVEDILOL 25 MG: 25 TABLET, FILM COATED ORAL at 09:02

## 2018-02-23 RX ADMIN — ESCITALOPRAM 10 MG: 5 TABLET, FILM COATED ORAL at 09:02

## 2018-02-23 RX ADMIN — IPRATROPIUM BROMIDE AND ALBUTEROL SULFATE 3 ML: .5; 3 SOLUTION RESPIRATORY (INHALATION) at 09:02

## 2018-02-23 RX ADMIN — ACETAMINOPHEN 1000 MG: 500 TABLET ORAL at 04:02

## 2018-02-23 RX ADMIN — PROPOFOL 50 MG/KG/MIN: 10 INJECTION, EMULSION INTRAVENOUS at 03:02

## 2018-02-23 RX ADMIN — ATORVASTATIN CALCIUM 20 MG: 20 TABLET, FILM COATED ORAL at 09:02

## 2018-02-23 RX ADMIN — FUROSEMIDE 20 MG: 10 INJECTION, SOLUTION INTRAMUSCULAR; INTRAVENOUS at 09:02

## 2018-02-23 RX ADMIN — SODIUM CHLORIDE: 0.9 INJECTION, SOLUTION INTRAVENOUS at 03:02

## 2018-02-23 RX ADMIN — PANTOPRAZOLE SODIUM 40 MG: 40 TABLET, DELAYED RELEASE ORAL at 09:02

## 2018-02-23 RX ADMIN — CAPTOPRIL 50 MG: 50 TABLET ORAL at 10:02

## 2018-02-23 RX ADMIN — AMLODIPINE BESYLATE 5 MG: 5 TABLET ORAL at 09:02

## 2018-02-23 RX ADMIN — PROPOFOL 30 MG: 10 INJECTION, EMULSION INTRAVENOUS at 03:02

## 2018-02-23 RX ADMIN — CLOPIDOGREL 75 MG: 75 TABLET, FILM COATED ORAL at 09:02

## 2018-02-23 RX ADMIN — FUROSEMIDE 20 MG: 10 INJECTION, SOLUTION INTRAMUSCULAR; INTRAVENOUS at 10:02

## 2018-02-23 RX ADMIN — FENTANYL CITRATE 25 MCG: 50 INJECTION, SOLUTION INTRAMUSCULAR; INTRAVENOUS at 03:02

## 2018-02-23 RX ADMIN — APIXABAN 5 MG: 5 TABLET, FILM COATED ORAL at 09:02

## 2018-02-23 RX ADMIN — APIXABAN 5 MG: 5 TABLET, FILM COATED ORAL at 08:02

## 2018-02-23 RX ADMIN — LIDOCAINE HYDROCHLORIDE 1 ML: 20 SOLUTION OROPHARYNGEAL at 03:02

## 2018-02-23 RX ADMIN — MAGNESIUM OXIDE TAB 400 MG (241.3 MG ELEMENTAL MG) 400 MG: 400 (241.3 MG) TAB at 08:02

## 2018-02-23 RX ADMIN — LEVOTHYROXINE SODIUM 100 MCG: 100 TABLET ORAL at 06:02

## 2018-02-23 NOTE — ASSESSMENT & PLAN NOTE
[facility] of [facility]. Cardiology following. Possible cardioversion once euvolemic   - Will make NPO overnight just in case of cardioversion  - eloquis 5mg BID   - contiue coreg 25 BID   - telemetry

## 2018-02-23 NOTE — ASSESSMENT & PLAN NOTE
Pt with mild volume overload on exam. Echo from 11/2017 shows EF 55%, PAP 66, diastolic dysfunction.     Continue lasix 20 IV BID   Continue Coreg 25 BID   Cont aldactone 25mg   Continue captopril 50mg   Strict I/O   Daily weights

## 2018-02-23 NOTE — PROGRESS NOTES
Notified MD that patient's family was concerned about taking 80mg of Lipitor when she had been taking 20 at home. MD changed order to home dosage.

## 2018-02-23 NOTE — SUBJECTIVE & OBJECTIVE
Past Medical History:   Diagnosis Date    A-fib 2/22/2018    Anticoagulant long-term use     Aortic valve disorders     Arthritis     Benign neoplasm of breast     Chest pain, unspecified     CHF (congestive heart failure)     Coronary artery disease     Depression     Diaphragmatic hernia without mention of obstruction or gangrene     Duodenal ulcer, unspecified as acute or chronic, without hemorrhage, perforation, or obstruction     Gastric ulcer, unspecified as acute or chronic, without mention of hemorrhage, perforation, or obstruction     GERD (gastroesophageal reflux disease)     Headache     Heart attack     History of migraine     HLD (hyperlipidemia)     HTN (hypertension)     Memory loss     Microscopic hematuria     Old myocardial infarction     Osteoporosis, unspecified     Personal history of colonic polyps     Primary pulmonary hypertension     Psychiatric problem     PTSD (post-traumatic stress disorder)     Rosacea     Stricture and stenosis of esophagus     Therapy     Unspecified hypothyroidism     Volvulus        Past Surgical History:   Procedure Laterality Date    ANGIOPLASTY      APPENDECTOMY      Open    BREAST BIOPSY      CATARACT EXTRACTION, BILATERAL      COLONOSCOPY W/ POLYPECTOMY      CORONARY ANGIOPLASTY      s/p stents    ESOPHAGOGASTRODUODENOSCOPY      EYE SURGERY      TONSILLECTOMY, ADENOIDECTOMY      TOTAL ABDOMINAL HYSTERECTOMY      rso, endometriosis       Review of patient's allergies indicates:   Allergen Reactions    Sulfa (sulfonamide antibiotics) Anaphylaxis       No current facility-administered medications on file prior to encounter.      Current Outpatient Prescriptions on File Prior to Encounter   Medication Sig    acetaminophen (TYLENOL) 80 MG Chew Take 500 mg by mouth as needed.    amlodipine (NORVASC) 5 MG tablet Take 1 tablet (5 mg total) by mouth once daily.    atorvastatin (LIPITOR) 20 MG tablet Take 4 tablets (80 mg  total) by mouth once daily.    captopril (CAPOTEN) 50 MG tablet Take 50 mg by mouth once daily.    carvedilol (COREG) 25 MG tablet Take 25 mg by mouth 2 (two) times daily with meals.    clopidogrel (PLAVIX) 75 mg tablet Take 1 tablet (75 mg total) by mouth once daily.    escitalopram oxalate (LEXAPRO) 10 MG tablet     estrogens, conjugated, (PREMARIN) 0.3 MG tablet Take 1 tablet (0.3 mg total) by mouth every evening.    levothyroxine (SYNTHROID) 100 MCG tablet TAKE 1 TABLET(100 MCG) BY MOUTH EVERY DAY    omeprazole (PRILOSEC) 20 MG capsule Take 1 capsule (20 mg total) by mouth 2 (two) times daily. (Patient taking differently: Take 20 mg by mouth once daily. )    spironolactone (ALDACTONE) 25 MG tablet     VIRT-MICHAEL FORTE 2.5-25-2 mg Tab TAKE 1 TABLET BY MOUTH EVERY DAY    FLUZONE HIGH-DOSE 2017-18, PF, 180 mcg/0.5 mL vaccine ADMINISTER 0.5ML IN THE MUSCLE AS DIRECTED    nitroGLYCERIN (NITROSTAT) 0.4 MG SL tablet Place 1 tablet (0.4 mg total) under the tongue every 5 (five) minutes as needed for Chest pain.     Family History     Problem Relation (Age of Onset)    Breast cancer Sister    Heart attack Father    Heart disease Father    Heart failure Mother, Father    Hypertension Father    No Known Problems Maternal Grandfather, Paternal Grandmother, Paternal Grandfather, Maternal Aunt, Maternal Uncle, Paternal Aunt, Paternal Uncle        Social History Main Topics    Smoking status: Never Smoker    Smokeless tobacco: Never Used    Alcohol use 0.6 oz/week     1 Glasses of wine per week      Comment: wine - nightly    Drug use: No    Sexual activity: Not Currently     Review of Systems   Constitution: Negative for chills, fever and malaise/fatigue.   HENT: Negative.    Cardiovascular: Positive for dyspnea on exertion. Negative for chest pain, irregular heartbeat, leg swelling, orthopnea, palpitations and paroxysmal nocturnal dyspnea.   Respiratory: Negative for shortness of breath and wheezing.    Skin:  Negative for color change and rash.   Musculoskeletal: Negative for arthritis, back pain and myalgias.   Gastrointestinal: Negative for abdominal pain, constipation, diarrhea and nausea.   Neurological: Negative for dizziness, numbness and paresthesias.   Psychiatric/Behavioral: Negative.      Objective:     Vital Signs (Most Recent):  Temp: 97.9 °F (36.6 °C) (02/23/18 1100)  Pulse: 67 (02/23/18 1200)  Resp: 16 (02/23/18 1100)  BP: 136/63 (02/23/18 1100)  SpO2: (!) 90 % (02/23/18 1100) Vital Signs (24h Range):  Temp:  [96.8 °F (36 °C)-97.9 °F (36.6 °C)] 97.9 °F (36.6 °C)  Pulse:  [56-86] 67  Resp:  [15-19] 16  SpO2:  [90 %-96 %] 90 %  BP: (115-154)/(56-67) 136/63     Weight: 76.7 kg (169 lb 1.5 oz)  Body mass index is 30.93 kg/m².    SpO2: (!) 90 %  O2 Device (Oxygen Therapy): room air      Intake/Output Summary (Last 24 hours) at 02/23/18 1423  Last data filed at 02/23/18 1400   Gross per 24 hour   Intake              358 ml   Output             1525 ml   Net            -1167 ml       Lines/Drains/Airways     Peripheral Intravenous Line                 Peripheral IV - Single Lumen 02/23/18 1352 Left Forearm less than 1 day                Physical Exam   Constitutional: Vital signs are normal. She appears well-developed and well-nourished. She is cooperative.  Non-toxic appearance. No distress.   HENT:   Head: Normocephalic and atraumatic.   Neck: No hepatojugular reflux and no JVD present. Carotid bruit is not present.   Cardiovascular: Normal rate, S1 normal, S2 normal and normal heart sounds.  An irregularly irregular rhythm present. Exam reveals no gallop.    No murmur heard.  Pulses:       Carotid pulses are 2+ on the right side, and 2+ on the left side.       Radial pulses are 2+ on the right side, and 2+ on the left side.        Dorsalis pedis pulses are 2+ on the right side, and 2+ on the left side.        Posterior tibial pulses are 2+ on the right side, and 2+ on the left side.   No lower extremity edema    Pulmonary/Chest: Effort normal and breath sounds normal. No respiratory distress. She has no decreased breath sounds. She has no wheezes. She has no rhonchi. She has no rales.   Abdominal: Soft. Normal appearance and bowel sounds are normal. She exhibits no distension and no mass. There is no tenderness.   Neurological: She is alert.   Skin: Skin is warm, dry and intact.       Significant Labs:   CMP   Recent Labs  Lab 02/22/18  1237 02/23/18  0403   * 138   K 4.0 3.8    104   CO2 18* 23   * 100   BUN 33* 32*   CREATININE 1.0 1.1   CALCIUM 8.9 8.6*   PROT 6.4 5.6*   ALBUMIN 3.2* 2.8*   BILITOT 0.6 0.7   ALKPHOS 118 99   AST 52* 40   ALT 63* 56*   ANIONGAP 10 11   ESTGFRAFRICA 58.1* 51.8*   EGFRNONAA 50.4* 44.9*   , CBC   Recent Labs  Lab 02/22/18  1237 02/23/18  0403   WBC 11.30 8.63   HGB 11.2* 10.3*   HCT 34.5* 31.2*    275    and INR   Recent Labs  Lab 02/22/18  1237   INR 1.1       Significant Imaging: Echocardiogram:   2D echo with color flow doppler:   Results for orders placed or performed during the hospital encounter of 11/10/17   2D Echo w/ Color Flow Doppler   Result Value Ref Range    EF 55 55 - 65    Mitral Valve Regurgitation MILD     Diastolic Dysfunction Yes (A)     Est. PA Systolic Pressure 66.04 (A)     Tricuspid Valve Regurgitation MODERATE TO SEVERE (A)        EKG: AFib with HR of 60. Far right axis with intraventricular conduction delay.

## 2018-02-23 NOTE — ASSESSMENT & PLAN NOTE
NISHI for evaluation of EMIGDIO prior to DCCV    -The risks, benefits & alternatives of the procedure were explained to the patient.    -The risks of transesophageal echo include but are not limited to:  Dental trauma, esophageal trauma/perforation, bleeding, laryngospasm/brochospasm, aspiration, sore throat/hoarseness, & dislodgement of the endotracheal tube/nasogastric tube (where applicable).    -The risks of moderate sedation include hypotension, respiratory depression, arrhythmias, bronchospasm, & death.    -Informed consent was obtained & the patient is agreeable to proceed with the procedure.

## 2018-02-23 NOTE — SUBJECTIVE & OBJECTIVE
Past Medical History:   Diagnosis Date    A-fib 2/22/2018    Anticoagulant long-term use     Aortic valve disorders     Arthritis     Benign neoplasm of breast     Chest pain, unspecified     CHF (congestive heart failure)     Coronary artery disease     Depression     Diaphragmatic hernia without mention of obstruction or gangrene     Duodenal ulcer, unspecified as acute or chronic, without hemorrhage, perforation, or obstruction     Gastric ulcer, unspecified as acute or chronic, without mention of hemorrhage, perforation, or obstruction     GERD (gastroesophageal reflux disease)     Headache     Heart attack     History of migraine     HLD (hyperlipidemia)     HTN (hypertension)     Memory loss     Microscopic hematuria     Old myocardial infarction     Osteoporosis, unspecified     Personal history of colonic polyps     Primary pulmonary hypertension     Psychiatric problem     PTSD (post-traumatic stress disorder)     Rosacea     Stricture and stenosis of esophagus     Therapy     Unspecified hypothyroidism     Volvulus        Past Surgical History:   Procedure Laterality Date    ANGIOPLASTY      APPENDECTOMY      Open    BREAST BIOPSY      CATARACT EXTRACTION, BILATERAL      COLONOSCOPY W/ POLYPECTOMY      CORONARY ANGIOPLASTY      s/p stents    ESOPHAGOGASTRODUODENOSCOPY      EYE SURGERY      TONSILLECTOMY, ADENOIDECTOMY      TOTAL ABDOMINAL HYSTERECTOMY      rso, endometriosis       Review of patient's allergies indicates:   Allergen Reactions    Sulfa (sulfonamide antibiotics) Anaphylaxis       No current facility-administered medications on file prior to encounter.      Current Outpatient Prescriptions on File Prior to Encounter   Medication Sig    acetaminophen (TYLENOL) 80 MG Chew Take 500 mg by mouth as needed.    amlodipine (NORVASC) 5 MG tablet Take 1 tablet (5 mg total) by mouth once daily.    atorvastatin (LIPITOR) 20 MG tablet Take 4 tablets (80 mg  total) by mouth once daily.    captopril (CAPOTEN) 50 MG tablet Take 50 mg by mouth once daily.    carvedilol (COREG) 25 MG tablet Take 25 mg by mouth 2 (two) times daily with meals.    clopidogrel (PLAVIX) 75 mg tablet Take 1 tablet (75 mg total) by mouth once daily.    escitalopram oxalate (LEXAPRO) 10 MG tablet     estrogens, conjugated, (PREMARIN) 0.3 MG tablet Take 1 tablet (0.3 mg total) by mouth every evening.    levothyroxine (SYNTHROID) 100 MCG tablet TAKE 1 TABLET(100 MCG) BY MOUTH EVERY DAY    omeprazole (PRILOSEC) 20 MG capsule Take 1 capsule (20 mg total) by mouth 2 (two) times daily. (Patient taking differently: Take 20 mg by mouth once daily. )    spironolactone (ALDACTONE) 25 MG tablet     VIRT-MICHAEL FORTE 2.5-25-2 mg Tab TAKE 1 TABLET BY MOUTH EVERY DAY    FLUZONE HIGH-DOSE 2017-18, PF, 180 mcg/0.5 mL vaccine ADMINISTER 0.5ML IN THE MUSCLE AS DIRECTED    nitroGLYCERIN (NITROSTAT) 0.4 MG SL tablet Place 1 tablet (0.4 mg total) under the tongue every 5 (five) minutes as needed for Chest pain.     Family History     Problem Relation (Age of Onset)    Breast cancer Sister    Heart attack Father    Heart disease Father    Heart failure Mother, Father    Hypertension Father    No Known Problems Maternal Grandfather, Paternal Grandmother, Paternal Grandfather, Maternal Aunt, Maternal Uncle, Paternal Aunt, Paternal Uncle        Social History Main Topics    Smoking status: Never Smoker    Smokeless tobacco: Never Used    Alcohol use 0.6 oz/week     1 Glasses of wine per week      Comment: wine - nightly    Drug use: No    Sexual activity: Not Currently     Review of Systems   Constitutional: Negative for activity change, appetite change, chills, fever and unexpected weight change.   HENT: Negative for rhinorrhea and sore throat.    Eyes: Negative for pain and visual disturbance.   Respiratory: Positive for shortness of breath. Negative for cough.    Cardiovascular: Negative for chest pain and  palpitations.   Gastrointestinal: Negative for abdominal pain, diarrhea and nausea.   Genitourinary: Negative for dysuria, flank pain, hematuria, urgency, vaginal discharge and vaginal pain.   Musculoskeletal: Negative for gait problem and neck stiffness.   Skin: Negative for pallor and rash.   Neurological: Negative for syncope, light-headedness and headaches.     Objective:     Vital Signs (Most Recent):  Temp: 97.1 °F (36.2 °C) (02/22/18 1908)  Pulse: 67 (02/22/18 1957)  Resp: 18 (02/22/18 1957)  BP: (!) 144/65 (02/22/18 1908)  SpO2: (!) 93 % (02/22/18 1957) Vital Signs (24h Range):  Temp:  [96.8 °F (36 °C)-97.7 °F (36.5 °C)] 97.1 °F (36.2 °C)  Pulse:  [56-72] 67  Resp:  [18-20] 18  SpO2:  [90 %-96 %] 93 %  BP: (100-154)/(57-67) 144/65     Weight: 76.7 kg (169 lb 1.5 oz)  Body mass index is 30.93 kg/m².    Physical Exam   Constitutional: She is oriented to person, place, and time. She appears well-developed and well-nourished.   Cardiovascular: Normal rate, regular rhythm and normal heart sounds.  Exam reveals no gallop and no friction rub.    No murmur heard.  Pulmonary/Chest: Effort normal and breath sounds normal. No respiratory distress. She has no wheezes. She has no rales.   Abdominal: Soft. Bowel sounds are normal. She exhibits no distension. There is no tenderness.   Musculoskeletal: Normal range of motion. She exhibits edema (trace ).   Neurological: She is alert and oriented to person, place, and time. She displays normal reflexes. No cranial nerve deficit. Coordination normal.   Skin: Skin is warm and dry.           Significant Labs:   CBC:   Recent Labs  Lab 02/22/18  1237   WBC 11.30   HGB 11.2*   HCT 34.5*        CMP:   Recent Labs  Lab 02/22/18  1237   *   K 4.0      CO2 18*   *   BUN 33*   CREATININE 1.0   CALCIUM 8.9   PROT 6.4   ALBUMIN 3.2*   BILITOT 0.6   ALKPHOS 118   AST 52*   ALT 63*   ANIONGAP 10   EGFRNONAA 50.4*       Significant Imaging: I have reviewed and  interpreted all pertinent imaging results/findings within the past 24 hours.   EKG: Afib , HR 59

## 2018-02-23 NOTE — DISCHARGE INSTRUCTIONS
Chest Echocardiography (Transthoracic)     During an echo, images of your heart appear on a monitor.   An echocardiogram (echo) is an imaging test.  A transthoracic echocardiogram is sometimes called by its abbreviation TTE. It may also be called surface echocardiogram because the images are non-invasive taken from the surface of the chest wall. It helps your healthcare provider evaluate your heart. A more invasive type of echocardiogram involves the ultrasound probe being passed into the esophagus to get images (transesophageal).     This test:  · Is safe and generally painless. Some people have discomfort from the echo probe being pressed against the bony areas of the chest. This is relieves once the probe is moved.  · Can be done in a hospital, test center, or doctors office  · Bounces harmless sound waves (ultrasound) off the heart using a transducer or probe (device that looks like a microphone)  · Allows your healthcare provider evaluate the size and shape of your heart, and the size, thickness and movement of your heart's walls, and the heart's pumping strength.  · Shows if the heart valves are working correctly, if blood is leaking backwards through your heart valves (regurgitation), or if the heart valves are to onarrow (stenosis)  · Shows if there is a tumor or infectious growth around your heart valves  · Will help your healthcare provider find out if there are problems with the outer lining of your heart (pericardium)  · Shows problems with the large blood vessels that enter and leave the heart  · Demonstrates blood clots in the heart chambers  · Shows abnormal holes between heart chambers  ·   Before your echo  · Discuss any questions or concerns you have with your healthcare provider.  · Mention any over-the-counter or prescription medicines, herbs, or supplements youre taking.  · Allow extra time for checking in. Bring your insurance cards, identification, and any co-payments that are required for  the test.  · Wear a 2-piece outfit for the test. You may be asked to remove clothing and jewelry from the waist up. If so, youll be given a short hospital gown.  · An intravenous catheter may be inserted into a vein in your arm or hand. Contrast or bubbles will be injected during the study.    During your echo  · Small pads (electrodes) are placed on your chest to monitor your heartbeat.  · A transducer coated with cool gel is moved firmly over your chest. This device creates the sound waves that make images of your heart. If you are overweight, the technician may have to apply more pressure to the chest wall to improve the quality of the images. This pressure can be uncomfortable over bony areas. Tell your technician if you are uncomfortable.  · At times, you may be asked to exhale and hold your breath for a few seconds. Air in your lungs can affect the images.  · The transducer may also be used to do a Doppler study. This test measures the direction and speed of blood flowing through the heart. During the test, you may hear a whooshing sound. This is the sound of blood flowing through the heart.  · The technician may use IV contrast to improve the image quality or agitated saline may be used to follow blood flow through the chambers of the heart.  · The images of your heart are stored electronically. This is so your healthcare provider can review them later.    After your echo  · Return to normal activity unless your healthcare provider tells you otherwise.  · Be sure to keep follow-up appointments.    Your test results  Your healthcare provider will discuss your test results with you during a future office visit. The test results help the healthcare provider plan your treatment and any other tests that are needed.

## 2018-02-23 NOTE — ANESTHESIA PREPROCEDURE EVALUATION
02/23/2018  Symone Lloyd is a 88 y.o., female.  Pre-operative evaluation for TRANSESOPHAGEAL ECHOCARDIOGRAM (NISHI) (N/A)    Chief Complaint:    PMH:Patient with CAD and multiple co morbidities having cardioversion for a fib. Most recent ECHO showed good EF with PAP 66. Presently no SOB or chest pain. No change in exercise tolerance.       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    MCI (mild cognitive impairment)    Hepatitis A    Neck pain    Cervicalgia    Atherosclerotic peripheral vascular disease with intermittent claudication : : s/p PTA-DCB to L.SFA    Diarrhea    Absolute anemia    Gastric ulcer with hemorrhage: s/p endoscopic clip     Depression    Acute on chronic diastolic (congestive) heart failure    A-fib    New onset atrial fibrillation         Past Surgical History:   Procedure Laterality Date    ANGIOPLASTY      APPENDECTOMY      Open    BREAST BIOPSY      CATARACT EXTRACTION, BILATERAL      COLONOSCOPY W/ POLYPECTOMY      CORONARY ANGIOPLASTY      s/p stents    ESOPHAGOGASTRODUODENOSCOPY      EYE SURGERY      TONSILLECTOMY, ADENOIDECTOMY      TOTAL ABDOMINAL HYSTERECTOMY      rso, endometriosis         Vital Signs Range (Last 24H):  Temp:  [36.2 °C (97.1 °F)-36.6 °C (97.9 °F)]   Pulse:  [56-86]   Resp:  [15-19]   BP: (115-151)/(56-67)   SpO2:  [90 %-96 %]       CBC:     Recent Labs  Lab 02/22/18  1237 02/23/18  0403   WBC 11.30 8.63   RBC 4.14 3.78*   HGB 11.2* 10.3*   HCT 34.5* 31.2*    275   MCV 83 83   MCH 27.1 27.2   MCHC 32.5 33.0       CMP:   Recent  Labs  Lab 18  1237 18  0403   * 138   K 4.0 3.8    104   CO2 18* 23   BUN 33* 32*   CREATININE 1.0 1.1   * 100   CALCIUM 8.9 8.6*   ALBUMIN 3.2* 2.8*   PROT 6.4 5.6*   ALKPHOS 118 99   ALT 63* 56*   AST 52* 40   BILITOT 0.6 0.7       INR:    Recent Labs  Lab 18  1237   INR 1.1         Diagnostic Studies:      EKD Echo:    Anesthesia Evaluation    I have reviewed the Patient Summary Reports.    I have reviewed the Nursing Notes.   I have reviewed the Medications.     Review of Systems  Anesthesia Hx:  No problems with previous Anesthesia    Social:  Non-Smoker    Hematology/Oncology:  Hematology Normal   Oncology Normal     EENT/Dental:EENT/Dental Normal   Pulmonary:  Pulmonary Normal    Renal/:  Renal/ Normal     Musculoskeletal:  Musculoskeletal Normal    Neurological:  Neurology Normal    Dermatological:  Skin Normal        Physical Exam  General:  Obesity, Well nourished    Airway/Jaw/Neck:  Airway Findings: Mouth Opening: Normal Tongue: Normal  General Airway Assessment: Adult  Mallampati: II  Improves to II with phonation.  TM Distance: Normal, at least 6 cm      Dental:  Dental Findings: In tact   Chest/Lungs:  Chest/Lungs Findings: Clear to auscultation     Heart/Vascular:  Heart Findings: Rate: Normal  Rhythm: Irregularly Irregular        Mental Status:  Mental Status Findings:  Cooperative, Alert and Oriented         Anesthesia Plan  Type of Anesthesia, risks & benefits discussed:  Anesthesia Type:  general, MAC  Patient's Preference:   Intra-op Monitoring Plan:   Intra-op Monitoring Plan Comments:   Post Op Pain Control Plan:   Post Op Pain Control Plan Comments:   Induction:   IV  Beta Blocker:  Patient is on a Beta-Blocker and has received one dose within the past 24 hours (No further documentation required).       Informed Consent: Patient understands risks and agrees with Anesthesia plan.  Questions answered. Anesthesia consent signed with patient.  ASA  Score: 3     Day of Surgery Review of History & Physical:            Ready For Surgery From Anesthesia Perspective.

## 2018-02-23 NOTE — ASSESSMENT & PLAN NOTE
Mildly hypertensive. -150s   Continue home amlodipine   Also on coreg, spironolactone, lasix, captopril as above

## 2018-02-23 NOTE — NURSING TRANSFER
Nursing Transfer Note      2/23/2018     Transfer To: EP    Transfer via stretcher    Transfer with cardiac monitoring    Transported by RN    Medicines sent: none    Chart send with patient: Yes    Notified: son

## 2018-02-23 NOTE — TRANSFER OF CARE
"Anesthesia Transfer of Care Note    Patient: Symone Lloyd    Procedure(s) Performed: Procedure(s) (LRB):  TRANSESOPHAGEAL ECHOCARDIOGRAM (NISHI) (N/A)    Patient location: [facility]    Anesthesia Type: general    Transport from OR: Transported from OR on 2-3 L/min O2 by NC with adequate spontaneous ventilation. Continuous ECG monitoring in transport    Post pain: adequate analgesia    Post assessment: no apparent anesthetic complications    Post vital signs: stable    Level of consciousness: awake and alert    Nausea/Vomiting: no nausea/vomiting    Complications: none    Transfer of care protocol was followed      Last vitals:   Visit Vitals  /63 (Patient Position: Lying)   Pulse 69   Temp 36.6 °C (97.9 °F) (Oral)   Resp 16   Ht 5' 2" (1.575 m)   Wt 76.7 kg (169 lb 1.5 oz)   SpO2 (!) 90%   Breastfeeding? No   BMI 30.93 kg/m²     "

## 2018-02-23 NOTE — PLAN OF CARE
Problem: Patient Care Overview  Goal: Plan of Care Review  Outcome: Ongoing (interventions implemented as appropriate)  Patient remains free from falls and injuries through out shift. Patient AAO and VSS. Patient denies chest pain. NPO for cardioversion in the morning. Lasix IVP 20 mg and Eliquis initiated given per MD order. Commode at bedside and patient continues on 3L NC. Patient's son at bedside. Plan of care reviewed with patient and family. Patient verbalizes understanding of plan.  Will continue to monitor.

## 2018-02-23 NOTE — CONSULTS
[facility]  Cardiology  Consult Note    Patient Name: Symone Lloyd  MRN: [id number]  Admission Date: 2/22/2018  Hospital Length of Stay: 1 days  Code Status: Full Code   Attending Provider: Suellen Fuentes MD   Consulting Provider: Teresa Hampton MD  Primary Care Physician: Amanda Mcdonald MD  Principal Problem:New onset atrial fibrillation    Patient information was obtained from patient, past medical records and ER records.     Consults  Subjective:     Chief Complaint:  SOB     HPI:   87 y/o Female with hx of HTN, HLD, HFpEF, CAD s/p PCI with SKYLAR to mLAD 8/2015, PCI LCX and D1 2001, PAD s/p PTA with DCB L SFA 2015 here for worsening SOB over last 2 days. Pt notes SOB with just getting dressed today. Over last few months has noticed more LOYOLA with walking around house and ADL's. She lives alone and has aids that help with ADL's. She was found to be in Afib with HR in 50-60's today. Cardiology consults would like to perform a NISHI/DCCV. She is currently on eliquis for A/C.    Dysphagia or odynophagia:  NO  Liver Disease, esophageal disease, or known varices:  YES  +1. (History of esophageal stricture about 30cm from the incisors that was last dilated 2015. No issues swallowing since. Reported tortuous lower 1/3 of the esophagus. Her son states that she has had her stomach stapled to the anterior abdominal wall(?) although no records support this.)  Upper GI Bleeding: NO (history of gastric ulcer that was clipped in 2015. No bleeding recently)  Snoring:  NO  Sleep Apnea:  NO  Prior neck surgery or radiation:  NO  Able to move neck in all directions:  YES  +1  History of anesthetic difficulties:  NO  Family history of anesthetic difficulties:  NO  Last oral intake:  02/23/2018 at 00:01    Mallampati Class:  3  ASA Score:  2    Past Medical History:   Diagnosis Date    A-fib 2/22/2018    Anticoagulant long-term use     Aortic valve disorders     Arthritis     Benign neoplasm of breast      Chest pain, unspecified     CHF (congestive heart failure)     Coronary artery disease     Depression     Diaphragmatic hernia without mention of obstruction or gangrene     Duodenal ulcer, unspecified as acute or chronic, without hemorrhage, perforation, or obstruction     Gastric ulcer, unspecified as acute or chronic, without mention of hemorrhage, perforation, or obstruction     GERD (gastroesophageal reflux disease)     Headache     Heart attack     History of migraine     HLD (hyperlipidemia)     HTN (hypertension)     Memory loss     Microscopic hematuria     Old myocardial infarction     Osteoporosis, unspecified     Personal history of colonic polyps     Primary pulmonary hypertension     Psychiatric problem     PTSD (post-traumatic stress disorder)     Rosacea     Stricture and stenosis of esophagus     Therapy     Unspecified hypothyroidism     Volvulus        Past Surgical History:   Procedure Laterality Date    ANGIOPLASTY      APPENDECTOMY      Open    BREAST BIOPSY      CATARACT EXTRACTION, BILATERAL      COLONOSCOPY W/ POLYPECTOMY      CORONARY ANGIOPLASTY      s/p stents    ESOPHAGOGASTRODUODENOSCOPY      EYE SURGERY      TONSILLECTOMY, ADENOIDECTOMY      TOTAL ABDOMINAL HYSTERECTOMY      rso, endometriosis       Review of patient's allergies indicates:   Allergen Reactions    Sulfa (sulfonamide antibiotics) Anaphylaxis       No current facility-administered medications on file prior to encounter.      Current Outpatient Prescriptions on File Prior to Encounter   Medication Sig    acetaminophen (TYLENOL) 80 MG Chew Take 500 mg by mouth as needed.    amlodipine (NORVASC) 5 MG tablet Take 1 tablet (5 mg total) by mouth once daily.    atorvastatin (LIPITOR) 20 MG tablet Take 4 tablets (80 mg total) by mouth once daily.    captopril (CAPOTEN) 50 MG tablet Take 50 mg by mouth once daily.    carvedilol (COREG) 25 MG tablet Take 25 mg by mouth 2 (two) times daily  with meals.    clopidogrel (PLAVIX) 75 mg tablet Take 1 tablet (75 mg total) by mouth once daily.    escitalopram oxalate (LEXAPRO) 10 MG tablet     estrogens, conjugated, (PREMARIN) 0.3 MG tablet Take 1 tablet (0.3 mg total) by mouth every evening.    levothyroxine (SYNTHROID) 100 MCG tablet TAKE 1 TABLET(100 MCG) BY MOUTH EVERY DAY    omeprazole (PRILOSEC) 20 MG capsule Take 1 capsule (20 mg total) by mouth 2 (two) times daily. (Patient taking differently: Take 20 mg by mouth once daily. )    spironolactone (ALDACTONE) 25 MG tablet     VIRT-MICHAEL FORTE 2.5-25-2 mg Tab TAKE 1 TABLET BY MOUTH EVERY DAY    FLUZONE HIGH-DOSE 2017-18, PF, 180 mcg/0.5 mL vaccine ADMINISTER 0.5ML IN THE MUSCLE AS DIRECTED    nitroGLYCERIN (NITROSTAT) 0.4 MG SL tablet Place 1 tablet (0.4 mg total) under the tongue every 5 (five) minutes as needed for Chest pain.     Family History     Problem Relation (Age of Onset)    Breast cancer Sister    Heart attack Father    Heart disease Father    Heart failure Mother, Father    Hypertension Father    No Known Problems Maternal Grandfather, Paternal Grandmother, Paternal Grandfather, Maternal Aunt, Maternal Uncle, Paternal Aunt, Paternal Uncle        Social History Main Topics    Smoking status: Never Smoker    Smokeless tobacco: Never Used    Alcohol use 0.6 oz/week     1 Glasses of wine per week      Comment: wine - nightly    Drug use: No    Sexual activity: Not Currently     Review of Systems   Constitution: Negative for chills, fever and malaise/fatigue.   HENT: Negative.    Cardiovascular: Positive for dyspnea on exertion. Negative for chest pain, irregular heartbeat, leg swelling, orthopnea, palpitations and paroxysmal nocturnal dyspnea.   Respiratory: Negative for shortness of breath and wheezing.    Skin: Negative for color change and rash.   Musculoskeletal: Negative for arthritis, back pain and myalgias.   Gastrointestinal: Negative for abdominal pain, constipation,  diarrhea and nausea.   Neurological: Negative for dizziness, numbness and paresthesias.   Psychiatric/Behavioral: Negative.      Objective:     Vital Signs (Most Recent):  Temp: 97.9 °F (36.6 °C) (02/23/18 1100)  Pulse: 67 (02/23/18 1200)  Resp: 16 (02/23/18 1100)  BP: 136/63 (02/23/18 1100)  SpO2: (!) 90 % (02/23/18 1100) Vital Signs (24h Range):  Temp:  [96.8 °F (36 °C)-97.9 °F (36.6 °C)] 97.9 °F (36.6 °C)  Pulse:  [56-86] 67  Resp:  [15-19] 16  SpO2:  [90 %-96 %] 90 %  BP: (115-154)/(56-67) 136/63     Weight: 76.7 kg (169 lb 1.5 oz)  Body mass index is 30.93 kg/m².    SpO2: (!) 90 %  O2 Device (Oxygen Therapy): room air      Intake/Output Summary (Last 24 hours) at 02/23/18 1423  Last data filed at 02/23/18 1400   Gross per 24 hour   Intake              358 ml   Output             1525 ml   Net            -1167 ml       Lines/Drains/Airways     Peripheral Intravenous Line                 Peripheral IV - Single Lumen 02/23/18 1352 Left Forearm less than 1 day                Physical Exam   Constitutional: Vital signs are normal. She appears well-developed and well-nourished. She is cooperative.  Non-toxic appearance. No distress.   HENT:   Head: Normocephalic and atraumatic.   Neck: No hepatojugular reflux and no JVD present. Carotid bruit is not present.   Cardiovascular: Normal rate, S1 normal, S2 normal and normal heart sounds.  An irregularly irregular rhythm present. Exam reveals no gallop.    No murmur heard.  Pulses:       Carotid pulses are 2+ on the right side, and 2+ on the left side.       Radial pulses are 2+ on the right side, and 2+ on the left side.        Dorsalis pedis pulses are 2+ on the right side, and 2+ on the left side.        Posterior tibial pulses are 2+ on the right side, and 2+ on the left side.   No lower extremity edema   Pulmonary/Chest: Effort normal and breath sounds normal. No respiratory distress. She has no decreased breath sounds. She has no wheezes. She has no rhonchi. She has  no rales.   Abdominal: Soft. Normal appearance and bowel sounds are normal. She exhibits no distension and no mass. There is no tenderness.   Neurological: She is alert.   Skin: Skin is warm, dry and intact.       Significant Labs:   CMP   Recent Labs  Lab 02/22/18  1237 02/23/18  0403   * 138   K 4.0 3.8    104   CO2 18* 23   * 100   BUN 33* 32*   CREATININE 1.0 1.1   CALCIUM 8.9 8.6*   PROT 6.4 5.6*   ALBUMIN 3.2* 2.8*   BILITOT 0.6 0.7   ALKPHOS 118 99   AST 52* 40   ALT 63* 56*   ANIONGAP 10 11   ESTGFRAFRICA 58.1* 51.8*   EGFRNONAA 50.4* 44.9*   , CBC   Recent Labs  Lab 02/22/18  1237 02/23/18  0403   WBC 11.30 8.63   HGB 11.2* 10.3*   HCT 34.5* 31.2*    275    and INR   Recent Labs  Lab 02/22/18  1237   INR 1.1       Significant Imaging: Echocardiogram:   2D echo with color flow doppler:   Results for orders placed or performed during the hospital encounter of 11/10/17   2D Echo w/ Color Flow Doppler   Result Value Ref Range    EF 55 55 - 65    Mitral Valve Regurgitation MILD     Diastolic Dysfunction Yes (A)     Est. PA Systolic Pressure 66.04 (A)     Tricuspid Valve Regurgitation MODERATE TO SEVERE (A)        EKG: AFib with HR of 60. Far right axis with intraventricular conduction delay.    Assessment and Plan:     A-fib    NISHI for evaluation of EMIGDIO prior to DCCV    -The risks, benefits & alternatives of the procedure were explained to the patient.    -The risks of transesophageal echo include but are not limited to:  Dental trauma, esophageal trauma/perforation, bleeding, laryngospasm/brochospasm, aspiration, sore throat/hoarseness, & dislodgement of the endotracheal tube/nasogastric tube (where applicable).    -The risks of moderate sedation include hypotension, respiratory depression, arrhythmias, bronchospasm, & death.    -Informed consent was obtained & the patient is agreeable to proceed with the procedure.             VTE Risk Mitigation         Ordered     apixaban tablet 5  mg  2 times daily     Route:  Oral        02/22/18 1856     Medium Risk of VTE  Once      02/22/18 1938     Place sequential compression device  Until discontinued      02/22/18 1938        Teresa Hampton MD  Cardiology   [facility]

## 2018-02-23 NOTE — PLAN OF CARE
CM met with patient and her son this am to obtain discharge planning assessment. Patient admitted for A-Fib. Planned discharge is home - Plan (A) or home with family - Plan (B).    PCP:  Amanda Mcdonald MD     Payor: [employer] MANAGED MEDICARE / Plan: [employer] 65 / Product Type: Medicare Advantage /        [facility] [address] [place] [address] [address]  [address]  [facility] [address]  Phone: [phone] Fax: [phone]       02/23/18 1131   Discharge Assessment   Assessment Type Discharge Planning Assessment   Confirmed/corrected address and phone number on facesheet? Yes   Assessment information obtained from? Patient   Communicated expected length of stay with patient/caregiver no   Prior to hospitilization cognitive status: Alert/Oriented   Prior to hospitalization functional status: Assistive Equipment   Current cognitive status: Alert/Oriented   Current Functional Status: Assistive Equipment   Lives With alone   Able to Return to Prior Arrangements yes   Is patient able to care for self after discharge? Yes   Who are your caregiver(s) and their phone number(s)? Tramaine guzmán [phone]   Patient's perception of discharge disposition home or selfcare   Readmission Within The Last 30 Days no previous admission in last 30 days   Patient currently being followed by outpatient case management? No   Patient currently receives any other outside agency services? No   Equipment Currently Used at Home cane, straight   Do you have any problems affording any of your prescribed medications? No   Is the patient taking medications as prescribed? yes   Does the patient have transportation home? Yes   Transportation Available family or friend will provide   Does the patient receive services at the [facility]? No   Discharge Plan A Home   Discharge Plan B Home with family   Patient/Family In Agreement With Plan yes

## 2018-02-23 NOTE — H&P
[facility] Hospital Medicine  History & Physical     Patient Name: Symone Lloyd  MRN: [id number]  Admission Date: 2/22/2018  Attending Physician: Suellen Fuentes MD  Primary Care Provider: Amanda Mcdonald MD    [facility] Medicine Team: [facility] MED  Suellen Fuentes MD     Patient information was obtained from patient and ER records.     Subjective:     Principal Problem:New onset atrial fibrillation    Chief Complaint:   Chief Complaint   Patient presents with    Shortness of Breath     Pt c/o SOB & dizziness.  Onset last night.  Pt denies chest pain.         HPI: 87 y/o woman with HTN, HLD, HFpEF, CAD s/p PCI with SKYLAR to mLAD 8/2015, PCI LCX and D1 2001 with worsening SOB over the last day. Pt states she normally completes her ADL's is able to walk independently with a cane. She states over the last day she has noticed worsening SOB with daily activities and walking. She went to the ED for evaluation and was found to be in afib w/ HR 50-60's today which is a new diagnosis. She denies any chest pain, palpitations, lightheadedness, cough, fevers, chills .         Past Medical History:   Diagnosis Date    A-fib 2/22/2018    Anticoagulant long-term use     Aortic valve disorders     Arthritis     Benign neoplasm of breast     Chest pain, unspecified     CHF (congestive heart failure)     Coronary artery disease     Depression     Diaphragmatic hernia without mention of obstruction or gangrene     Duodenal ulcer, unspecified as acute or chronic, without hemorrhage, perforation, or obstruction     Gastric ulcer, unspecified as acute or chronic, without mention of hemorrhage, perforation, or obstruction     GERD (gastroesophageal reflux disease)     Headache     Heart attack     History of migraine     HLD (hyperlipidemia)     HTN (hypertension)     Memory loss     Microscopic hematuria     Old myocardial infarction     Osteoporosis, unspecified     Personal history of  colonic polyps     Primary pulmonary hypertension     Psychiatric problem     PTSD (post-traumatic stress disorder)     Rosacea     Stricture and stenosis of esophagus     Therapy     Unspecified hypothyroidism     Volvulus        Past Surgical History:   Procedure Laterality Date    ANGIOPLASTY      APPENDECTOMY      Open    BREAST BIOPSY      CATARACT EXTRACTION, BILATERAL      COLONOSCOPY W/ POLYPECTOMY      CORONARY ANGIOPLASTY      s/p stents    ESOPHAGOGASTRODUODENOSCOPY      EYE SURGERY      TONSILLECTOMY, ADENOIDECTOMY      TOTAL ABDOMINAL HYSTERECTOMY      rso, endometriosis       Review of patient's allergies indicates:   Allergen Reactions    Sulfa (sulfonamide antibiotics) Anaphylaxis       No current facility-administered medications on file prior to encounter.      Current Outpatient Prescriptions on File Prior to Encounter   Medication Sig    acetaminophen (TYLENOL) 80 MG Chew Take 500 mg by mouth as needed.    amlodipine (NORVASC) 5 MG tablet Take 1 tablet (5 mg total) by mouth once daily.    atorvastatin (LIPITOR) 20 MG tablet Take 4 tablets (80 mg total) by mouth once daily.    captopril (CAPOTEN) 50 MG tablet Take 50 mg by mouth once daily.    carvedilol (COREG) 25 MG tablet Take 25 mg by mouth 2 (two) times daily with meals.    clopidogrel (PLAVIX) 75 mg tablet Take 1 tablet (75 mg total) by mouth once daily.    escitalopram oxalate (LEXAPRO) 10 MG tablet     estrogens, conjugated, (PREMARIN) 0.3 MG tablet Take 1 tablet (0.3 mg total) by mouth every evening.    levothyroxine (SYNTHROID) 100 MCG tablet TAKE 1 TABLET(100 MCG) BY MOUTH EVERY DAY    omeprazole (PRILOSEC) 20 MG capsule Take 1 capsule (20 mg total) by mouth 2 (two) times daily. (Patient taking differently: Take 20 mg by mouth once daily. )    spironolactone (ALDACTONE) 25 MG tablet     VIRT-MICHAEL FORTE 2.5-25-2 mg Tab TAKE 1 TABLET BY MOUTH EVERY DAY    FLUZONE HIGH-DOSE 2017-18, PF, 180 mcg/0.5 mL  vaccine ADMINISTER 0.5ML IN THE MUSCLE AS DIRECTED    nitroGLYCERIN (NITROSTAT) 0.4 MG SL tablet Place 1 tablet (0.4 mg total) under the tongue every 5 (five) minutes as needed for Chest pain.     Family History     Problem Relation (Age of Onset)    Breast cancer Sister    Heart attack Father    Heart disease Father    Heart failure Mother, Father    Hypertension Father    No Known Problems Maternal Grandfather, Paternal Grandmother, Paternal Grandfather, Maternal Aunt, Maternal Uncle, Paternal Aunt, Paternal Uncle        Social History Main Topics    Smoking status: Never Smoker    Smokeless tobacco: Never Used    Alcohol use 0.6 oz/week     1 Glasses of wine per week      Comment: wine - nightly    Drug use: No    Sexual activity: Not Currently     Review of Systems   Constitutional: Negative for activity change, appetite change, chills, fever and unexpected weight change.   HENT: Negative for rhinorrhea and sore throat.    Eyes: Negative for pain and visual disturbance.   Respiratory: Positive for shortness of breath. Negative for cough.    Cardiovascular: Negative for chest pain and palpitations.   Gastrointestinal: Negative for abdominal pain, diarrhea and nausea.   Genitourinary: Negative for dysuria, flank pain, hematuria, urgency, vaginal discharge and vaginal pain.   Musculoskeletal: Negative for gait problem and neck stiffness.   Skin: Negative for pallor and rash.   Neurological: Negative for syncope, light-headedness and headaches.     Objective:     Vital Signs (Most Recent):  Temp: 97.1 °F (36.2 °C) (02/22/18 1908)  Pulse: 67 (02/22/18 1957)  Resp: 18 (02/22/18 1957)  BP: (!) 144/65 (02/22/18 1908)  SpO2: (!) 93 % (02/22/18 1957) Vital Signs (24h Range):  Temp:  [96.8 °F (36 °C)-97.7 °F (36.5 °C)] 97.1 °F (36.2 °C)  Pulse:  [56-72] 67  Resp:  [18-20] 18  SpO2:  [90 %-96 %] 93 %  BP: (100-154)/(57-67) 144/65     Weight: 76.7 kg (169 lb 1.5 oz)  Body mass index is 30.93 kg/m².    Physical Exam    Constitutional: She is oriented to person, place, and time. She appears well-developed and well-nourished.   Cardiovascular: Normal rate, regular rhythm and normal heart sounds.  Exam reveals no gallop and no friction rub.    No murmur heard.  Pulmonary/Chest: Effort normal and breath sounds normal. No respiratory distress. She has no wheezes. She has no rales.   Abdominal: Soft. Bowel sounds are normal. She exhibits no distension. There is no tenderness.   Musculoskeletal: Normal range of motion. She exhibits edema (trace ).   Neurological: She is alert and oriented to person, place, and time. She displays normal reflexes. No cranial nerve deficit. Coordination normal.   Skin: Skin is warm and dry.           Significant Labs:   CBC:   Recent Labs  Lab 02/22/18  1237   WBC 11.30   HGB 11.2*   HCT 34.5*        CMP:   Recent Labs  Lab 02/22/18  1237   *   K 4.0      CO2 18*   *   BUN 33*   CREATININE 1.0   CALCIUM 8.9   PROT 6.4   ALBUMIN 3.2*   BILITOT 0.6   ALKPHOS 118   AST 52*   ALT 63*   ANIONGAP 10   EGFRNONAA 50.4*       Significant Imaging: I have reviewed and interpreted all pertinent imaging results/findings within the past 24 hours.   EKG: Afib , HR 59    Assessment/Plan:     * New onset atrial fibrillation    CHADSVASc of 6. Cardiology following. Possible cardioversion once euvolemic   - Will make NPO overnight just in case of cardioversion  - eloquis 5mg BID   - contiue coreg 25 BID   - telemetry           Acute on chronic diastolic (congestive) heart failure    Pt with mild volume overload on exam. Echo from 11/2017 shows EF 55%, PAP 66, diastolic dysfunction.     Continue lasix 20 IV BID   Continue Coreg 25 BID   Cont aldactone 25mg   Continue captopril 50mg   Strict I/O   Daily weights         Depression    Stable. Cont Lexapro           CAD (coronary artery disease): s/p BMS to D1 and LCx 2001; 2.25 SKYLAR to mid LAD 2015.    Stable. Cont plavix, statin, coreg, ACE          Dyslipidemia    Cont home statin           Hypothyroidism    Stable. Cont home synthroid           HTN (hypertension)    Mildly hypertensive. -150s   Continue home amlodipine   Also on coreg, spironolactone, lasix, captopril as above          VTE Risk Mitigation         Ordered     apixaban tablet 5 mg  2 times daily     Route:  Oral        02/22/18 1856     Medium Risk of VTE  Once      02/22/18 1938     Place sequential compression device  Until discontinued      02/22/18 1938             Suellen Fuentes MD  Department of Hospital Medicine   [facility]

## 2018-02-23 NOTE — PLAN OF CARE
Problem: Patient Care Overview  Goal: Plan of Care Review  Outcome: Ongoing (interventions implemented as appropriate)  Plan of care discussed with patient. Patient is free of fall/trauma/injury. Denies CP, SOB, or pain/discomfort. Patient is still NPO for DCCV. All questions addressed. Will continue to monitor

## 2018-02-23 NOTE — NURSING
Pt admitted to CSU. Pt arrived to floor with telemtry from ED via stretcher per pt escort. Telemetry transferred to CSU monitor.  VSS. NAD. No complaints at this time.  Plan of care initiated. CSU orders imitated. Pt's family at bedside. Bed in lowest position, SR up x2, call bell in reach. Will continue to monitor pt.

## 2018-02-23 NOTE — PROGRESS NOTES
Anesthesia and Cardiology called about pt's low HR in 30's/40's.    Per Dr. Torre, Anesthesia order given to check Blood glcsose.     Per Dr. Granados, order given for STAT EKG and repeat EKG in am. Order also given to hold 1715 dose of 25mg Carvedilol.

## 2018-02-23 NOTE — ANESTHESIA PREPROCEDURE EVALUATION
02/23/2018  Symone Lloyd is a 88 y.o., female.  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    MCI (mild cognitive impairment)    Hepatitis A    Neck pain    Cervicalgia    Atherosclerotic peripheral vascular disease with intermittent claudication : : s/p PTA-DCB to L.SFA    Diarrhea    Absolute anemia    Gastric ulcer with hemorrhage: s/p endoscopic clip     Depression    Acute on chronic diastolic (congestive) heart failure    A-fib    New onset atrial fibrillation       Pre-operative evaluation for CARDIOVERSION (N/A)    Chief Complaint:    PMH:    Past Surgical History:   Procedure Laterality Date    ANGIOPLASTY      APPENDECTOMY      Open    BREAST BIOPSY      CATARACT EXTRACTION, BILATERAL      COLONOSCOPY W/ POLYPECTOMY      CORONARY ANGIOPLASTY      s/p stents    ESOPHAGOGASTRODUODENOSCOPY      EYE SURGERY      TONSILLECTOMY, ADENOIDECTOMY      TOTAL ABDOMINAL HYSTERECTOMY      rso, endometriosis         Vital Signs Range (Last 24H):  Temp:  [36 °C (96.8 °F)-36.6 °C (97.9 °F)]   Pulse:  [56-86]   Resp:  [15-19]   BP: (115-154)/(56-67)   SpO2:  [90 %-96 %]       CBC:     Recent Labs  Lab 02/22/18  1237 02/23/18  0403   WBC 11.30 8.63   RBC 4.14 3.78*   HGB 11.2* 10.3*   HCT 34.5* 31.2*    275   MCV 83 83   MCH 27.1 27.2   MCHC 32.5 33.0       CMP:   Recent Labs  Lab 02/22/18  1237 02/23/18  0403   * 138   K 4.0 3.8    104   CO2 18* 23   BUN 33* 32*   CREATININE 1.0 1.1   * 100   CALCIUM 8.9 8.6*   ALBUMIN 3.2* 2.8*   PROT 6.4 5.6*   ALKPHOS 118 99    ALT 63* 56*   AST 52* 40   BILITOT 0.6 0.7       INR:    Recent Labs  Lab 18  1237   INR 1.1         Diagnostic Studies:      EKD Echo:    Anesthesia Evaluation    I have reviewed the Patient Summary Reports.    I have reviewed the Nursing Notes.   I have reviewed the Medications.     Review of Systems  Anesthesia Hx:  No problems with previous Anesthesia    Social:  Non-Smoker    Hematology/Oncology:  Hematology Normal   Oncology Normal     EENT/Dental:EENT/Dental Normal   Pulmonary:  Pulmonary Normal    Musculoskeletal:  Musculoskeletal Normal    Dermatological:  Skin Normal        Physical Exam  General:  Well nourished, Obesity    Airway/Jaw/Neck:  Airway Findings: Mouth Opening: Normal Tongue: Normal  General Airway Assessment: Adult  Mallampati: II  Improves to II with phonation.  TM Distance: Normal, at least 6 cm      Dental:  Dental Findings: In tact   Chest/Lungs:  Chest/Lungs Findings: Clear to auscultation     Heart/Vascular:  Heart Findings: Rate: Normal  Rhythm: Irregularly Irregular  Sounds: Normal        Mental Status:  Mental Status Findings:  Cooperative, Alert and Oriented         Anesthesia Plan  Type of Anesthesia, risks & benefits discussed:  Anesthesia Type:  general, MAC  Patient's Preference:   Intra-op Monitoring Plan:   Intra-op Monitoring Plan Comments:   Post Op Pain Control Plan:   Post Op Pain Control Plan Comments:   Induction:   IV  Beta Blocker:  Patient is on a Beta-Blocker and has received one dose within the past 24 hours (No further documentation required).       Informed Consent: Patient understands risks and agrees with Anesthesia plan.  Questions answered. Anesthesia consent signed with patient.  ASA Score: 3     Day of Surgery Review of History & Physical:            Ready For Surgery From Anesthesia Perspective.

## 2018-02-24 LAB
ALBUMIN SERPL BCP-MCNC: 2.9 G/DL
ALP SERPL-CCNC: 98 U/L
ALT SERPL W/O P-5'-P-CCNC: 51 U/L
ANION GAP SERPL CALC-SCNC: 8 MMOL/L
AST SERPL-CCNC: 30 U/L
BASOPHILS # BLD AUTO: 0.03 K/UL
BASOPHILS NFR BLD: 0.3 %
BILIRUB SERPL-MCNC: 0.7 MG/DL
BUN SERPL-MCNC: 32 MG/DL
CALCIUM SERPL-MCNC: 8.3 MG/DL
CHLORIDE SERPL-SCNC: 104 MMOL/L
CO2 SERPL-SCNC: 24 MMOL/L
CREAT SERPL-MCNC: 1.3 MG/DL
DIFFERENTIAL METHOD: ABNORMAL
EOSINOPHIL # BLD AUTO: 0.2 K/UL
EOSINOPHIL NFR BLD: 1.5 %
ERYTHROCYTE [DISTWIDTH] IN BLOOD BY AUTOMATED COUNT: 17.2 %
EST. GFR  (AFRICAN AMERICAN): 42.3 ML/MIN/1.73 M^2
EST. GFR  (NON AFRICAN AMERICAN): 36.7 ML/MIN/1.73 M^2
GLUCOSE SERPL-MCNC: 98 MG/DL
HCT VFR BLD AUTO: 32.3 %
HGB BLD-MCNC: 10.9 G/DL
IMM GRANULOCYTES # BLD AUTO: 0.02 K/UL
IMM GRANULOCYTES NFR BLD AUTO: 0.2 %
LYMPHOCYTES # BLD AUTO: 3.7 K/UL
LYMPHOCYTES NFR BLD: 35.6 %
MAGNESIUM SERPL-MCNC: 1.5 MG/DL
MCH RBC QN AUTO: 26.9 PG
MCHC RBC AUTO-ENTMCNC: 33.7 G/DL
MCV RBC AUTO: 80 FL
MONOCYTES # BLD AUTO: 1 K/UL
MONOCYTES NFR BLD: 9.4 %
NEUTROPHILS # BLD AUTO: 5.5 K/UL
NEUTROPHILS NFR BLD: 53 %
NRBC BLD-RTO: 0 /100 WBC
PLATELET # BLD AUTO: 293 K/UL
PMV BLD AUTO: 9.5 FL
POTASSIUM SERPL-SCNC: 4.2 MMOL/L
PROT SERPL-MCNC: 5.8 G/DL
RBC # BLD AUTO: 4.05 M/UL
SODIUM SERPL-SCNC: 136 MMOL/L
WBC # BLD AUTO: 10.44 K/UL

## 2018-02-24 PROCEDURE — 85025 COMPLETE CBC W/AUTO DIFF WBC: CPT

## 2018-02-24 PROCEDURE — 99232 SBSQ HOSP IP/OBS MODERATE 35: CPT | Mod: ,,, | Performed by: INTERNAL MEDICINE

## 2018-02-24 PROCEDURE — 11000001 HC ACUTE MED/SURG PRIVATE ROOM

## 2018-02-24 PROCEDURE — 25000003 PHARM REV CODE 250: Performed by: NURSE PRACTITIONER

## 2018-02-24 PROCEDURE — 94640 AIRWAY INHALATION TREATMENT: CPT

## 2018-02-24 PROCEDURE — 25000242 PHARM REV CODE 250 ALT 637 W/ HCPCS: Performed by: HOSPITALIST

## 2018-02-24 PROCEDURE — 80053 COMPREHEN METABOLIC PANEL: CPT

## 2018-02-24 PROCEDURE — 93010 ELECTROCARDIOGRAM REPORT: CPT | Mod: ,,, | Performed by: INTERNAL MEDICINE

## 2018-02-24 PROCEDURE — 94761 N-INVAS EAR/PLS OXIMETRY MLT: CPT

## 2018-02-24 PROCEDURE — 99232 SBSQ HOSP IP/OBS MODERATE 35: CPT | Mod: ,,, | Performed by: HOSPITALIST

## 2018-02-24 PROCEDURE — 25000003 PHARM REV CODE 250: Performed by: HOSPITALIST

## 2018-02-24 PROCEDURE — 83735 ASSAY OF MAGNESIUM: CPT

## 2018-02-24 PROCEDURE — 93005 ELECTROCARDIOGRAM TRACING: CPT

## 2018-02-24 PROCEDURE — 63600175 PHARM REV CODE 636 W HCPCS: Performed by: HOSPITALIST

## 2018-02-24 RX ORDER — LANOLIN ALCOHOL/MO/W.PET/CERES
400 CREAM (GRAM) TOPICAL EVERY 6 HOURS
Status: COMPLETED | OUTPATIENT
Start: 2018-02-24 | End: 2018-02-24

## 2018-02-24 RX ORDER — CARVEDILOL 6.25 MG/1
6.25 TABLET ORAL 2 TIMES DAILY WITH MEALS
Status: DISCONTINUED | OUTPATIENT
Start: 2018-02-24 | End: 2018-02-25 | Stop reason: HOSPADM

## 2018-02-24 RX ORDER — FUROSEMIDE 10 MG/ML
20 INJECTION INTRAMUSCULAR; INTRAVENOUS 2 TIMES DAILY
Status: DISCONTINUED | OUTPATIENT
Start: 2018-02-24 | End: 2018-02-24

## 2018-02-24 RX ORDER — FUROSEMIDE 20 MG/1
20 TABLET ORAL DAILY
Status: DISCONTINUED | OUTPATIENT
Start: 2018-02-25 | End: 2018-02-25 | Stop reason: HOSPADM

## 2018-02-24 RX ORDER — CARVEDILOL 12.5 MG/1
12.5 TABLET ORAL 2 TIMES DAILY WITH MEALS
Status: DISCONTINUED | OUTPATIENT
Start: 2018-02-24 | End: 2018-02-24

## 2018-02-24 RX ADMIN — STANDARDIZED SENNA CONCENTRATE AND DOCUSATE SODIUM 1 TABLET: 8.6; 5 TABLET, FILM COATED ORAL at 08:02

## 2018-02-24 RX ADMIN — AMLODIPINE BESYLATE 5 MG: 5 TABLET ORAL at 08:02

## 2018-02-24 RX ADMIN — IPRATROPIUM BROMIDE AND ALBUTEROL SULFATE 3 ML: .5; 3 SOLUTION RESPIRATORY (INHALATION) at 01:02

## 2018-02-24 RX ADMIN — APIXABAN 5 MG: 5 TABLET, FILM COATED ORAL at 08:02

## 2018-02-24 RX ADMIN — ESCITALOPRAM 10 MG: 5 TABLET, FILM COATED ORAL at 08:02

## 2018-02-24 RX ADMIN — FUROSEMIDE 20 MG: 10 INJECTION, SOLUTION INTRAMUSCULAR; INTRAVENOUS at 08:02

## 2018-02-24 RX ADMIN — ATORVASTATIN CALCIUM 20 MG: 20 TABLET, FILM COATED ORAL at 08:02

## 2018-02-24 RX ADMIN — CAPTOPRIL 50 MG: 50 TABLET ORAL at 08:02

## 2018-02-24 RX ADMIN — LEVOTHYROXINE SODIUM 100 MCG: 100 TABLET ORAL at 06:02

## 2018-02-24 RX ADMIN — SPIRONOLACTONE 25 MG: 25 TABLET, FILM COATED ORAL at 08:02

## 2018-02-24 RX ADMIN — MAGNESIUM OXIDE TAB 400 MG (241.3 MG ELEMENTAL MG) 400 MG: 400 (241.3 MG) TAB at 11:02

## 2018-02-24 RX ADMIN — CLOPIDOGREL 75 MG: 75 TABLET, FILM COATED ORAL at 08:02

## 2018-02-24 RX ADMIN — CARVEDILOL 6.25 MG: 6.25 TABLET, FILM COATED ORAL at 05:02

## 2018-02-24 RX ADMIN — PANTOPRAZOLE SODIUM 40 MG: 40 TABLET, DELAYED RELEASE ORAL at 08:02

## 2018-02-24 RX ADMIN — IPRATROPIUM BROMIDE AND ALBUTEROL SULFATE 3 ML: .5; 3 SOLUTION RESPIRATORY (INHALATION) at 08:02

## 2018-02-24 RX ADMIN — MAGNESIUM OXIDE TAB 400 MG (241.3 MG ELEMENTAL MG) 400 MG: 400 (241.3 MG) TAB at 07:02

## 2018-02-24 RX ADMIN — IPRATROPIUM BROMIDE AND ALBUTEROL SULFATE 3 ML: .5; 3 SOLUTION RESPIRATORY (INHALATION) at 07:02

## 2018-02-24 NOTE — HOSPITAL COURSE
The patient was aggressively diuresed and underwent NISHI guided DCCV and has converted to SR. Currently has sinus bradycardia in 40-50s with frequent PACs. Denied any complaints.

## 2018-02-24 NOTE — PROGRESS NOTES
Notified MD patient had been running in the 40-50s on the monitor. Ordered to hold the coreg. Continuing to monitor.

## 2018-02-24 NOTE — NURSING TRANSFER
Nursing Transfer Note      2/23/2018     Transfer To:  from [facility] 30    Transfer via bed    Transfer with  to O2, cardiac monitoring    Transported by RN X 2    Medicines sent: none    Chart send with patient: Yes    Notified: son    Patient reassessed at: 6:27 PM     Upon arrival to floor: patient oriented to room, call bell in reach and bed in lowest position    Report given to RN before transfer

## 2018-02-24 NOTE — NURSING TRANSFER
Nursing Transfer Note      2/23/2018     Transfer From: [facility] 30    Transfer via stretcher    Transfer with 3L to O2, cardiac monitoring    Transported by RN    Medicines sent: none    Chart send with patient: Yes    Notified: son    Patient reassessed at: 2/23/2018 1830   Upon arrival to floor: cardiac monitor applied, patient oriented to room, call bell in reach and bed in lowest position

## 2018-02-24 NOTE — PROGRESS NOTES
[facility]  Cardiology  Progress Note    Patient Name: Symone Lloyd  MRN: [id number]  Admission Date: 2/22/2018  Hospital Length of Stay: 1 days  Code Status: Full Code   Attending Physician: Suellen Fuentes MD   Primary Care Physician: Amanda Mcdonald MD  Expected Discharge Date: 2/24/2018  Principal Problem:New onset atrial fibrillation    Subjective:     Hospital Course:   The patient was aggressively diuresed and underwent NISHI guided DCCV and has converted to SR. Currently has sinus bradycardia in 40-50s with frequent PACs. Denied any complaints.    Interval History: S/p NISHI guided DCCV. Feels well and denies any complaints. Appears near euvolemic.    ROS   Constitution: Negative for fever, chills, weight loss or gain.   HENT: Negative for sore throat, rhinorrhea, or headache.  Eyes: Negative for blurred or double vision.   Cardiovascular: See above  Pulmonary: Negative for SOB   Gastrointestinal: Negative for abdominal pain, nausea, vomiting, or diarrhea.   : Negative for dysuria.   Neurological: Negative for focal weakness or sensory changes.    Objective:     Vital Signs (Most Recent):  Temp: 97.4 °F (36.3 °C) (02/23/18 1826)  Pulse: (!) 50 (02/23/18 1826)  Resp: 16 (02/23/18 1826)  BP: (!) 133/96 (02/23/18 1826)  SpO2: (!) 91 % (02/23/18 1826) Vital Signs (24h Range):  Temp:  [97 °F (36.1 °C)-97.9 °F (36.6 °C)] 97.4 °F (36.3 °C)  Pulse:  [47-86] 50  Resp:  [15-20] 16  SpO2:  [90 %-99 %] 91 %  BP: (115-139)/(43-96) 133/96     Weight: 76.7 kg (169 lb 1.5 oz)  Body mass index is 30.93 kg/m².     SpO2: (!) 91 %  O2 Device (Oxygen Therapy): nasal cannula      Intake/Output Summary (Last 24 hours) at 02/23/18 1909  Last data filed at 02/23/18 1551   Gross per 24 hour   Intake              608 ml   Output             1400 ml   Net             -792 ml       Lines/Drains/Airways     Peripheral Intravenous Line                 Peripheral IV - Single Lumen 02/23/18 1352 Left Forearm less  than 1 day                Physical Exam   Constitutional: NAD, conversant  HEENT: Sclera anicteric, PERRLA, EOMI  Neck: No JVD, no carotid bruits  CV: Regular rhythm, Bradycardic, no murmur, normal S1/S2, No Pericardial rub  Pulm: CTAB with no wheezes, rales, or rhonchi  GI: Abdomen soft, NTND, +BS  Extremities: 1+ LE edema, warm and well perfused, No cyanosis, No clubbing  Skin: No ecchymosis, erythema, or ulcers  Psych: AOx3, appropriate affect  Neuro: CNII-XII intact, no focal deficits        Significant Labs:   CMP   Recent Labs  Lab 02/22/18  1237 02/23/18  0403 02/23/18  1723   * 138 137   K 4.0 3.8 3.6    104 103   CO2 18* 23 24   * 100 112*   BUN 33* 32* 29*   CREATININE 1.0 1.1 1.1   CALCIUM 8.9 8.6* 9.1   PROT 6.4 5.6*  --    ALBUMIN 3.2* 2.8*  --    BILITOT 0.6 0.7  --    ALKPHOS 118 99  --    AST 52* 40  --    ALT 63* 56*  --    ANIONGAP 10 11 10   ESTGFRAFRICA 58.1* 51.8* 51.8*   EGFRNONAA 50.4* 44.9* 44.9*   , CBC   Recent Labs  Lab 02/22/18  1237 02/23/18  0403   WBC 11.30 8.63   HGB 11.2* 10.3*   HCT 34.5* 31.2*    275   , Troponin   Recent Labs  Lab 02/22/18  1237   TROPONINI 0.013    and All pertinent lab results from the last 24 hours have been reviewed.    Significant Imaging:   NISHI (2/23/18):  1 - Normal biventricular systolic function (LVEF 55-60%).     2 - Biatrial enlargement.     3 - Moderate tricuspid regurgitation.     4 - Left atrial appendage is multilobed with no visualized thrombus.     TTE (11/10/17):   1 - Normal left ventricular systolic function (EF 55-60%). The inferior septum is mildly hypokinetic    2 - Impaired LV relaxation, elevated LAP (grade 2 diastolic dysfunction).     3 - Normal right ventricular systolic function .     4 - Pulmonary hypertension. The estimated PA systolic pressure is 66 mmHg.     5 - Mild mitral regurgitation.     6 - Moderate to severe tricuspid regurgitation. Tricuspid annulus is dilated.     7 - Biatrial enlargement.      Assessment and Plan:       A-fib    - new onset  - s/p NISHI guided DCCV - currently has sinus bradycardia  - c/w eliquis for CVA PPx            Acute on chronic diastolic (congestive) heart failure    - Would cont Lasix 20mg IV BID  - Likely precipitated by dietary indiscretions, recent URI, along with new onset Afib  - Cont Aldactone and Norvasc for control of BP  - Educate on salt and fluid restriction  - will hold coreg tonight due to bradycardia  - monitor lytes - keep K>4.0, Mg>2.0            VTE Risk Mitigation         Ordered     apixaban tablet 5 mg  2 times daily     Route:  Oral        02/22/18 1856     Medium Risk of VTE  Once      02/22/18 1938     Place sequential compression device  Until discontinued      02/22/18 1938        Will monitor overnight for improvement in HR and assess volume status in AM. If appears euvolemic, will transition to PO lasix and recommend discharging in AM. Discussed with Dr. Lai.    Klaus Toney MD  Cardiology  [facility]

## 2018-02-24 NOTE — PROGRESS NOTES
[facility]  Cardiology  Progress Note    Patient Name: Symone Lloyd  MRN: [id number]  Admission Date: 2/22/2018  Hospital Length of Stay: 2 days  Code Status: Full Code   Attending Physician: Suellen Fuentes MD   Primary Care Physician: Amanda Mcdonald MD  Expected Discharge Date: 2/24/2018  Principal Problem:New onset atrial fibrillation    Subjective:     Hospital Course:   The patient was aggressively diuresed and underwent NISHI guided DCCV and has converted to SR. Currently has sinus bradycardia in 40-50s with frequent PACs. Denied any complaints.    Interval History: S/p NISHI guided DCCV. Feels well and denies any complaints. Appears near euvolemic.  Telemetry shows sinus bradycardia with PACs and PVCs. Rates upper 40's to mid 50's.    Coreg 25mg was held last evening and held today morning.    ROS     Constitution: Negative for fever, chills, weight loss or gain.   HENT: Negative for sore throat, rhinorrhea, or headache.  Eyes: Negative for blurred or double vision.   Cardiovascular: See above  Pulmonary: Negative for SOB   Gastrointestinal: Negative for abdominal pain, nausea, vomiting, or diarrhea.   : Negative for dysuria.   Neurological: Negative for focal weakness or sensory changes.    Objective:     Vital Signs (Most Recent):  Temp: 97.7 °F (36.5 °C) (02/24/18 1142)  Pulse: (!) 54 (02/24/18 1400)  Resp: 16 (02/24/18 1315)  BP: (!) 168/72 (02/24/18 1142)  SpO2: 95 % (02/24/18 1315) Vital Signs (24h Range):  Temp:  [97 °F (36.1 °C)-98.4 °F (36.9 °C)] 97.7 °F (36.5 °C)  Pulse:  [44-69] 54  Resp:  [16-20] 16  SpO2:  [86 %-99 %] 95 %  BP: (109-168)/(43-96) 168/72     Weight: 74.1 kg (163 lb 5.8 oz)  Body mass index is 29.88 kg/m².     SpO2: 95 %  O2 Device (Oxygen Therapy): room air      Intake/Output Summary (Last 24 hours) at 02/24/18 1426  Last data filed at 02/24/18 0500   Gross per 24 hour   Intake              430 ml   Output              250 ml   Net              180 ml        Lines/Drains/Airways     Peripheral Intravenous Line                 Peripheral IV - Single Lumen 02/23/18 1352 Left Forearm 1 day                Physical Exam     Constitutional: NAD, conversant  HEENT: Sclera anicteric, PERRLA, EOMI  Neck: No JVD, no carotid bruits  CV: Regular rhythm, Bradycardic, no murmur, normal S1/S2, No Pericardial rub  Pulm: CTAB with no wheezes, rales, or rhonchi  GI: Abdomen soft, NTND, +BS  Extremities: 1+ LE edema, warm and well perfused, No cyanosis, No clubbing  Skin: No ecchymosis, erythema, or ulcers  Psych: AOx3, appropriate affect  Neuro: CNII-XII intact, no focal deficits        Significant Labs:   CMP     Recent Labs  Lab 02/23/18  0403 02/23/18  1723 02/24/18  0444    137 136   K 3.8 3.6 4.2    103 104   CO2 23 24 24    112* 98   BUN 32* 29* 32*   CREATININE 1.1 1.1 1.3   CALCIUM 8.6* 9.1 8.3*   PROT 5.6*  --  5.8*   ALBUMIN 2.8*  --  2.9*   BILITOT 0.7  --  0.7   ALKPHOS 99  --  98   AST 40  --  30   ALT 56*  --  51*   ANIONGAP 11 10 8   ESTGFRAFRICA 51.8* 51.8* 42.3*   EGFRNONAA 44.9* 44.9* 36.7*   , CBC     Recent Labs  Lab 02/23/18  0403 02/24/18  0444   WBC 8.63 10.44   HGB 10.3* 10.9*   HCT 31.2* 32.3*    293   , Troponin No results for input(s): TROPONINI in the last 48 hours. and All pertinent lab results from the last 24 hours have been reviewed.    Significant Imaging:   NISHI (2/23/18):  1 - Normal biventricular systolic function (LVEF 55-60%).     2 - Biatrial enlargement.     3 - Moderate tricuspid regurgitation.     4 - Left atrial appendage is multilobed with no visualized thrombus.     TTE (11/10/17):   1 - Normal left ventricular systolic function (EF 55-60%). The inferior septum is mildly hypokinetic    2 - Impaired LV relaxation, elevated LAP (grade 2 diastolic dysfunction).     3 - Normal right ventricular systolic function .     4 - Pulmonary hypertension. The estimated PA systolic pressure is 66 mmHg.     5 - Mild mitral  regurgitation.     6 - Moderate to severe tricuspid regurgitation. Tricuspid annulus is dilated.     7 - Biatrial enlargement.     Assessment and Plan:         A-fib    - C/w Eliquis   -Due to bradycardia, would advise walking her and observing heart rates, if remains bradycardic in the 50's range would recommend to reduce coreg to 6.25mg BID. (son states that her baseline HR is in the 50's-60's on 25mg BID of coreg)              Acute on chronic diastolic (congestive) heart failure    - Would cont Lasix 20mg IV BID and can change to PO lasix from tomorrow  - Cont Aldactone and Norvasc for control of BP  - will hold coreg tonight due to bradycardia  - monitor lytes - keep K>4.0, Mg>2.0            VTE Risk Mitigation         Ordered     apixaban tablet 5 mg  2 times daily     Route:  Oral        02/22/18 1856     Medium Risk of VTE  Once      02/22/18 1938     Place sequential compression device  Until discontinued      02/22/18 1938          Kizzy Lopez MD  Cardiology  [facility]

## 2018-02-24 NOTE — ASSESSMENT & PLAN NOTE
- new onset  - s/p NISHI guided DCCV - currently has sinus bradycardia  - c/w eliquis for CVA PPx

## 2018-02-24 NOTE — ASSESSMENT & PLAN NOTE
- Would cont Lasix 20mg IV BID  - Likely precipitated by dietary indiscretions, recent URI, along with new onset Afib  - Cont Aldactone and Norvasc for control of BP  - Educate on salt and fluid restriction  - will hold coreg tonight due to bradycardia  - monitor lytes - keep K>4.0, Mg>2.0

## 2018-02-24 NOTE — SUBJECTIVE & OBJECTIVE
Interval History: S/p NISHI guided DCCV. Feels well and denies any complaints. Appears near euvolemic.    ROS   Constitution: Negative for fever, chills, weight loss or gain.   HENT: Negative for sore throat, rhinorrhea, or headache.  Eyes: Negative for blurred or double vision.   Cardiovascular: See above  Pulmonary: Negative for SOB   Gastrointestinal: Negative for abdominal pain, nausea, vomiting, or diarrhea.   : Negative for dysuria.   Neurological: Negative for focal weakness or sensory changes.    Objective:     Vital Signs (Most Recent):  Temp: 97.4 °F (36.3 °C) (02/23/18 1826)  Pulse: (!) 50 (02/23/18 1826)  Resp: 16 (02/23/18 1826)  BP: (!) 133/96 (02/23/18 1826)  SpO2: (!) 91 % (02/23/18 1826) Vital Signs (24h Range):  Temp:  [97 °F (36.1 °C)-97.9 °F (36.6 °C)] 97.4 °F (36.3 °C)  Pulse:  [47-86] 50  Resp:  [15-20] 16  SpO2:  [90 %-99 %] 91 %  BP: (115-139)/(43-96) 133/96     Weight: 76.7 kg (169 lb 1.5 oz)  Body mass index is 30.93 kg/m².     SpO2: (!) 91 %  O2 Device (Oxygen Therapy): nasal cannula      Intake/Output Summary (Last 24 hours) at 02/23/18 1909  Last data filed at 02/23/18 1551   Gross per 24 hour   Intake              608 ml   Output             1400 ml   Net             -792 ml       Lines/Drains/Airways     Peripheral Intravenous Line                 Peripheral IV - Single Lumen 02/23/18 1352 Left Forearm less than 1 day                Physical Exam   Constitutional: NAD, conversant  HEENT: Sclera anicteric, PERRLA, EOMI  Neck: No JVD, no carotid bruits  CV: Regular rhythm, Bradycardic, no murmur, normal S1/S2, No Pericardial rub  Pulm: CTAB with no wheezes, rales, or rhonchi  GI: Abdomen soft, NTND, +BS  Extremities: 1+ LE edema, warm and well perfused, No cyanosis, No clubbing  Skin: No ecchymosis, erythema, or ulcers  Psych: AOx3, appropriate affect  Neuro: CNII-XII intact, no focal deficits        Significant Labs:   CMP   Recent Labs  Lab 02/22/18  1237 02/23/18  0403 02/23/18  1723    * 138 137   K 4.0 3.8 3.6    104 103   CO2 18* 23 24   * 100 112*   BUN 33* 32* 29*   CREATININE 1.0 1.1 1.1   CALCIUM 8.9 8.6* 9.1   PROT 6.4 5.6*  --    ALBUMIN 3.2* 2.8*  --    BILITOT 0.6 0.7  --    ALKPHOS 118 99  --    AST 52* 40  --    ALT 63* 56*  --    ANIONGAP 10 11 10   ESTGFRAFRICA 58.1* 51.8* 51.8*   EGFRNONAA 50.4* 44.9* 44.9*   , CBC   Recent Labs  Lab 02/22/18  1237 02/23/18  0403   WBC 11.30 8.63   HGB 11.2* 10.3*   HCT 34.5* 31.2*    275   , Troponin   Recent Labs  Lab 02/22/18  1237   TROPONINI 0.013    and All pertinent lab results from the last 24 hours have been reviewed.    Significant Imaging:   NISHI (2/23/18):  1 - Normal biventricular systolic function (LVEF 55-60%).     2 - Biatrial enlargement.     3 - Moderate tricuspid regurgitation.     4 - Left atrial appendage is multilobed with no visualized thrombus.     TTE (11/10/17):   1 - Normal left ventricular systolic function (EF 55-60%). The inferior septum is mildly hypokinetic    2 - Impaired LV relaxation, elevated LAP (grade 2 diastolic dysfunction).     3 - Normal right ventricular systolic function .     4 - Pulmonary hypertension. The estimated PA systolic pressure is 66 mmHg.     5 - Mild mitral regurgitation.     6 - Moderate to severe tricuspid regurgitation. Tricuspid annulus is dilated.     7 - Biatrial enlargement.

## 2018-02-24 NOTE — PROGRESS NOTES
Ambulated with patient in hallway and monitored Rhythm per Drs request. Patient's heart rate jumped from the 50s to 80-90s and jumped up to 114 at one point but came back down to the 80-90 range. Patient stated that she felt SOB but that it happens when she ambulates. Notified MD Fuentes and she stated she would be stopping by the patient's room. Will continue to monitor.

## 2018-02-24 NOTE — SUBJECTIVE & OBJECTIVE
Interval History: Pt feels well this AM. Awaiting cardioversion.    Review of Systems   Constitutional: Negative for activity change, appetite change, chills, fever and unexpected weight change.   HENT: Negative for rhinorrhea and sore throat.    Eyes: Negative for pain and visual disturbance.   Respiratory: Positive for shortness of breath. Negative for cough.    Cardiovascular: Negative for chest pain and palpitations.   Gastrointestinal: Negative for abdominal pain, diarrhea and nausea.   Genitourinary: Negative for dysuria, flank pain, hematuria, urgency, vaginal discharge and vaginal pain.   Musculoskeletal: Negative for gait problem and neck stiffness.   Skin: Negative for pallor and rash.   Neurological: Negative for syncope, light-headedness and headaches.     Objective:     Vital Signs (Most Recent):  Temp: 97.7 °F (36.5 °C) (02/23/18 2305)  Pulse: (!) 50 (02/23/18 2305)  Resp: 18 (02/23/18 2305)  BP: 119/81 (02/23/18 2305)  SpO2: (!) 94 % (02/23/18 2305) Vital Signs (24h Range):  Temp:  [97 °F (36.1 °C)-98 °F (36.7 °C)] 97.7 °F (36.5 °C)  Pulse:  [47-86] 50  Resp:  [15-20] 18  SpO2:  [90 %-99 %] 94 %  BP: (109-139)/(43-96) 119/81     Weight: 76.7 kg (169 lb 1.5 oz)  Body mass index is 30.93 kg/m².    Intake/Output Summary (Last 24 hours) at 02/23/18 2339  Last data filed at 02/23/18 2300   Gross per 24 hour   Intake              430 ml   Output             1150 ml   Net             -720 ml      Physical Exam   Constitutional: She is oriented to person, place, and time. She appears well-developed and well-nourished.   Cardiovascular: Normal rate, regular rhythm and normal heart sounds.  Exam reveals no gallop and no friction rub.    No murmur heard.  Pulmonary/Chest: Effort normal and breath sounds normal. No respiratory distress. She has no wheezes. She has no rales.   Abdominal: Soft. Bowel sounds are normal. She exhibits no distension. There is no tenderness.   Musculoskeletal: Normal range of motion.  She exhibits edema (trace ).   Neurological: She is alert and oriented to person, place, and time. She displays normal reflexes. No cranial nerve deficit. Coordination normal.   Skin: Skin is warm and dry.       Significant Labs:   BMP:   Recent Labs  Lab 02/23/18  1723   *      K 3.6      CO2 24   BUN 29*   CREATININE 1.1   CALCIUM 9.1   MG 1.4*     CBC:   Recent Labs  Lab 02/22/18  1237 02/23/18  0403   WBC 11.30 8.63   HGB 11.2* 10.3*   HCT 34.5* 31.2*    275     CMP:   Recent Labs  Lab 02/22/18  1237 02/23/18  0403 02/23/18  1723   * 138 137   K 4.0 3.8 3.6    104 103   CO2 18* 23 24   * 100 112*   BUN 33* 32* 29*   CREATININE 1.0 1.1 1.1   CALCIUM 8.9 8.6* 9.1   PROT 6.4 5.6*  --    ALBUMIN 3.2* 2.8*  --    BILITOT 0.6 0.7  --    ALKPHOS 118 99  --    AST 52* 40  --    ALT 63* 56*  --    ANIONGAP 10 11 10   EGFRNONAA 50.4* 44.9* 44.9*       Significant Imaging: I have reviewed and interpreted all pertinent imaging results/findings within the past 24 hours.

## 2018-02-24 NOTE — ANESTHESIA POSTPROCEDURE EVALUATION
"Anesthesia Post Evaluation    Patient: Symone Lloyd    Procedure(s) Performed: Procedure(s) (LRB):  TRANSESOPHAGEAL ECHOCARDIOGRAM (NISHI) (N/A)    Final Anesthesia Type: general  Patient location during evaluation: PACU  Patient participation: Yes- Able to Participate  Level of consciousness: awake and alert  Post-procedure vital signs: reviewed and stable  Pain management: adequate  Airway patency: patent  PONV status at discharge: No PONV  Anesthetic complications: no      Cardiovascular status: blood pressure returned to baseline  Respiratory status: unassisted  Hydration status: euvolemic  Follow-up not needed.        Visit Vitals  BP (!) 168/72   Pulse (!) 53   Temp 36.5 °C (97.7 °F)   Resp 16   Ht 5' 2" (1.575 m)   Wt 74.1 kg (163 lb 5.8 oz)   SpO2 (!) 90%   Breastfeeding? No   BMI 29.88 kg/m²       Pain/Lenin Score: Pain Assessment Performed: Yes (2/24/2018 12:00 PM)  Presence of Pain: denies (2/24/2018 12:00 PM)  Pain Rating Prior to Med Admin: 8 (2/23/2018  4:54 PM)  Lenin Score: 10 (2/23/2018  6:10 PM)      "

## 2018-02-24 NOTE — ASSESSMENT & PLAN NOTE
[facility] of [facility]. Cardiology following.   - NISHI/Cardioversion today   - eloquis 5mg BID   - contiue coreg 25 BID   - telemetry

## 2018-02-24 NOTE — PROGRESS NOTES
Called into room by Respiratory. During treatment tech noticed that patients right hand is significantly more swollen than the left hand. Patient had a coband wrapped extremely tight around her wrist from lab draws. There was also bruising underneath the coband. Coband removed, hand elevated. Notified Dr. Fuentes. Ordered to keep hand elevated and that she would be stopping by later anyway so she will take a quick look as well. Continuing to monitor.

## 2018-02-24 NOTE — PLAN OF CARE
Problem: Patient Care Overview  Goal: Plan of Care Review  Outcome: Ongoing (interventions implemented as appropriate)  Plan of care discussed with patient. Patient is free of fall/trauma/injury. Denies CP, SOB, or pain/discomfort. Patient's Rhythm is still Sinus Amrik. Coreg held this morning. All questions addressed. Will continue to monitor

## 2018-02-24 NOTE — SUBJECTIVE & OBJECTIVE
Interval History: S/p NISHI guided DCCV. Feels well and denies any complaints. Appears near euvolemic.  Telemetry shows sinus bradycardia with PACs and PVCs. Rates upper 40's to mid 50's.    Coreg 25mg was held last evening and held today morning.    ROS     Constitution: Negative for fever, chills, weight loss or gain.   HENT: Negative for sore throat, rhinorrhea, or headache.  Eyes: Negative for blurred or double vision.   Cardiovascular: See above  Pulmonary: Negative for SOB   Gastrointestinal: Negative for abdominal pain, nausea, vomiting, or diarrhea.   : Negative for dysuria.   Neurological: Negative for focal weakness or sensory changes.    Objective:     Vital Signs (Most Recent):  Temp: 97.7 °F (36.5 °C) (02/24/18 1142)  Pulse: (!) 54 (02/24/18 1400)  Resp: 16 (02/24/18 1315)  BP: (!) 168/72 (02/24/18 1142)  SpO2: 95 % (02/24/18 1315) Vital Signs (24h Range):  Temp:  [97 °F (36.1 °C)-98.4 °F (36.9 °C)] 97.7 °F (36.5 °C)  Pulse:  [44-69] 54  Resp:  [16-20] 16  SpO2:  [86 %-99 %] 95 %  BP: (109-168)/(43-96) 168/72     Weight: 74.1 kg (163 lb 5.8 oz)  Body mass index is 29.88 kg/m².     SpO2: 95 %  O2 Device (Oxygen Therapy): room air      Intake/Output Summary (Last 24 hours) at 02/24/18 1426  Last data filed at 02/24/18 0500   Gross per 24 hour   Intake              430 ml   Output              250 ml   Net              180 ml       Lines/Drains/Airways     Peripheral Intravenous Line                 Peripheral IV - Single Lumen 02/23/18 1352 Left Forearm 1 day                Physical Exam     Constitutional: NAD, conversant  HEENT: Sclera anicteric, PERRLA, EOMI  Neck: No JVD, no carotid bruits  CV: Regular rhythm, Bradycardic, no murmur, normal S1/S2, No Pericardial rub  Pulm: CTAB with no wheezes, rales, or rhonchi  GI: Abdomen soft, NTND, +BS  Extremities: 1+ LE edema, warm and well perfused, No cyanosis, No clubbing  Skin: No ecchymosis, erythema, or ulcers  Psych: AOx3, appropriate affect  Neuro:  CNII-XII intact, no focal deficits        Significant Labs:   CMP     Recent Labs  Lab 02/23/18  0403 02/23/18  1723 02/24/18  0444    137 136   K 3.8 3.6 4.2    103 104   CO2 23 24 24    112* 98   BUN 32* 29* 32*   CREATININE 1.1 1.1 1.3   CALCIUM 8.6* 9.1 8.3*   PROT 5.6*  --  5.8*   ALBUMIN 2.8*  --  2.9*   BILITOT 0.7  --  0.7   ALKPHOS 99  --  98   AST 40  --  30   ALT 56*  --  51*   ANIONGAP 11 10 8   ESTGFRAFRICA 51.8* 51.8* 42.3*   EGFRNONAA 44.9* 44.9* 36.7*   , CBC     Recent Labs  Lab 02/23/18  0403 02/24/18  0444   WBC 8.63 10.44   HGB 10.3* 10.9*   HCT 31.2* 32.3*    293   , Troponin No results for input(s): TROPONINI in the last 48 hours. and All pertinent lab results from the last 24 hours have been reviewed.    Significant Imaging:   NISHI (2/23/18):  1 - Normal biventricular systolic function (LVEF 55-60%).     2 - Biatrial enlargement.     3 - Moderate tricuspid regurgitation.     4 - Left atrial appendage is multilobed with no visualized thrombus.     TTE (11/10/17):   1 - Normal left ventricular systolic function (EF 55-60%). The inferior septum is mildly hypokinetic    2 - Impaired LV relaxation, elevated LAP (grade 2 diastolic dysfunction).     3 - Normal right ventricular systolic function .     4 - Pulmonary hypertension. The estimated PA systolic pressure is 66 mmHg.     5 - Mild mitral regurgitation.     6 - Moderate to severe tricuspid regurgitation. Tricuspid annulus is dilated.     7 - Biatrial enlargement.

## 2018-02-24 NOTE — PROGRESS NOTES
Notified MD of patient's potassium and magnesium levels. Stated they would replace it. Continuing to monitor.

## 2018-02-24 NOTE — PROGRESS NOTES
[facility] Hospital Medicine  Progress Note    Patient Name: Symone Lloyd  MRN: [id number]  Patient Class: IP- Inpatient   Admission Date: 2/22/2018  Length of Stay: 1 days  Attending Physician: Suellen Fuentes MD  Primary Care Provider: Amanda Mcdonald MD    [facility] Medicine Team: [facility] HOSP MED  Suellen Fuentes MD    Subjective:     Principal Problem:New onset atrial fibrillation    HPI:  89 y/o woman with HTN, HLD, HFpEF, CAD s/p PCI with SKYLAR to mLAD 8/2015, PCI LCX and D1 2001 with worsening SOB over the last day. Pt states she normally completes her ADL's is able to walk independently with a cane. She states over the last day she has noticed worsening SOB with daily activities and walking. She went to the ED for evaluation and was found to be in afib w/ HR 50-60's today which is a new diagnosis. She denies any chest pain, palpitations, lightheadedness, cough, fevers, chills .         Hospital Course:  No notes on file    Interval History: Pt feels well this AM. Awaiting cardioversion.    Review of Systems   Constitutional: Negative for activity change, appetite change, chills, fever and unexpected weight change.   HENT: Negative for rhinorrhea and sore throat.    Eyes: Negative for pain and visual disturbance.   Respiratory: Positive for shortness of breath. Negative for cough.    Cardiovascular: Negative for chest pain and palpitations.   Gastrointestinal: Negative for abdominal pain, diarrhea and nausea.   Genitourinary: Negative for dysuria, flank pain, hematuria, urgency, vaginal discharge and vaginal pain.   Musculoskeletal: Negative for gait problem and neck stiffness.   Skin: Negative for pallor and rash.   Neurological: Negative for syncope, light-headedness and headaches.     Objective:     Vital Signs (Most Recent):  Temp: 97.7 °F (36.5 °C) (02/23/18 2305)  Pulse: (!) 50 (02/23/18 2305)  Resp: 18 (02/23/18 2305)  BP: 119/81 (02/23/18 2305)  SpO2: (!) 94 % (02/23/18 2305)  Vital Signs (24h Range):  Temp:  [97 °F (36.1 °C)-98 °F (36.7 °C)] 97.7 °F (36.5 °C)  Pulse:  [47-86] 50  Resp:  [15-20] 18  SpO2:  [90 %-99 %] 94 %  BP: (109-139)/(43-96) 119/81     Weight: 76.7 kg (169 lb 1.5 oz)  Body mass index is 30.93 kg/m².    Intake/Output Summary (Last 24 hours) at 02/23/18 2339  Last data filed at 02/23/18 2300   Gross per 24 hour   Intake              430 ml   Output             1150 ml   Net             -720 ml      Physical Exam   Constitutional: She is oriented to person, place, and time. She appears well-developed and well-nourished.   Cardiovascular: Normal rate, regular rhythm and normal heart sounds.  Exam reveals no gallop and no friction rub.    No murmur heard.  Pulmonary/Chest: Effort normal and breath sounds normal. No respiratory distress. She has no wheezes. She has no rales.   Abdominal: Soft. Bowel sounds are normal. She exhibits no distension. There is no tenderness.   Musculoskeletal: Normal range of motion. She exhibits edema (trace ).   Neurological: She is alert and oriented to person, place, and time. She displays normal reflexes. No cranial nerve deficit. Coordination normal.   Skin: Skin is warm and dry.       Significant Labs:   BMP:   Recent Labs  Lab 02/23/18  1723   *      K 3.6      CO2 24   BUN 29*   CREATININE 1.1   CALCIUM 9.1   MG 1.4*     CBC:   Recent Labs  Lab 02/22/18  1237 02/23/18  0403   WBC 11.30 8.63   HGB 11.2* 10.3*   HCT 34.5* 31.2*    275     CMP:   Recent Labs  Lab 02/22/18  1237 02/23/18  0403 02/23/18  1723   * 138 137   K 4.0 3.8 3.6    104 103   CO2 18* 23 24   * 100 112*   BUN 33* 32* 29*   CREATININE 1.0 1.1 1.1   CALCIUM 8.9 8.6* 9.1   PROT 6.4 5.6*  --    ALBUMIN 3.2* 2.8*  --    BILITOT 0.6 0.7  --    ALKPHOS 118 99  --    AST 52* 40  --    ALT 63* 56*  --    ANIONGAP 10 11 10   EGFRNONAA 50.4* 44.9* 44.9*       Significant Imaging: I have reviewed and interpreted all pertinent imaging  results/findings within the past 24 hours.    Assessment/Plan:      * New onset atrial fibrillation    CHADSVASc of 6. Cardiology following.   - NISHI/Cardioversion today   - eloquis 5mg BID   - contiue coreg 25 BID   - telemetry           Acute on chronic diastolic (congestive) heart failure    Pt with mild volume overload on exam. Echo from 11/2017 shows EF 55%, PAP 66, diastolic dysfunction.     Continue lasix 20 IV BID   Continue Coreg 25 BID   Cont aldactone 25mg   Continue captopril 50mg   Strict I/O   Daily weights         Depression    Stable. Cont Lexapro           CAD (coronary artery disease): s/p BMS to D1 and LCx 2001; 2.25 SKYLAR to mid LAD 2015.    Stable. Cont plavix, statin, coreg, ACE         Dyslipidemia    Cont home statin           Hypothyroidism    Stable. Cont home synthroid           HTN (hypertension)    Mildly hypertensive. -150s   Continue home amlodipine   Also on coreg, spironolactone, lasix, captopril as above          VTE Risk Mitigation         Ordered     apixaban tablet 5 mg  2 times daily     Route:  Oral        02/22/18 1856     Medium Risk of VTE  Once      02/22/18 1938     Place sequential compression device  Until discontinued      02/22/18 1938              Suellen Fuentes MD  Department of Hospital Medicine   [facility]

## 2018-02-25 VITALS
HEIGHT: 62 IN | OXYGEN SATURATION: 91 % | TEMPERATURE: 98 F | RESPIRATION RATE: 16 BRPM | BODY MASS INDEX: 29.78 KG/M2 | SYSTOLIC BLOOD PRESSURE: 140 MMHG | HEART RATE: 57 BPM | WEIGHT: 161.81 LBS | DIASTOLIC BLOOD PRESSURE: 63 MMHG

## 2018-02-25 LAB
ALBUMIN SERPL BCP-MCNC: 3 G/DL
ALP SERPL-CCNC: 108 U/L
ALT SERPL W/O P-5'-P-CCNC: 42 U/L
ANION GAP SERPL CALC-SCNC: 11 MMOL/L
AST SERPL-CCNC: 29 U/L
BASOPHILS # BLD AUTO: 0.05 K/UL
BASOPHILS NFR BLD: 0.4 %
BILIRUB SERPL-MCNC: 0.7 MG/DL
BUN SERPL-MCNC: 26 MG/DL
CALCIUM SERPL-MCNC: 8.6 MG/DL
CHLORIDE SERPL-SCNC: 105 MMOL/L
CO2 SERPL-SCNC: 22 MMOL/L
CREAT SERPL-MCNC: 1 MG/DL
DIFFERENTIAL METHOD: ABNORMAL
EOSINOPHIL # BLD AUTO: 0.2 K/UL
EOSINOPHIL NFR BLD: 1.5 %
ERYTHROCYTE [DISTWIDTH] IN BLOOD BY AUTOMATED COUNT: 17.5 %
EST. GFR  (AFRICAN AMERICAN): 58.1 ML/MIN/1.73 M^2
EST. GFR  (NON AFRICAN AMERICAN): 50.4 ML/MIN/1.73 M^2
GLUCOSE SERPL-MCNC: 97 MG/DL
HCT VFR BLD AUTO: 36.9 %
HGB BLD-MCNC: 12.2 G/DL
IMM GRANULOCYTES # BLD AUTO: 0.03 K/UL
IMM GRANULOCYTES NFR BLD AUTO: 0.3 %
LYMPHOCYTES # BLD AUTO: 4.8 K/UL
LYMPHOCYTES NFR BLD: 40.8 %
MAGNESIUM SERPL-MCNC: 1.6 MG/DL
MCH RBC QN AUTO: 26.6 PG
MCHC RBC AUTO-ENTMCNC: 33.1 G/DL
MCV RBC AUTO: 80 FL
MONOCYTES # BLD AUTO: 1.1 K/UL
MONOCYTES NFR BLD: 9 %
NEUTROPHILS # BLD AUTO: 5.6 K/UL
NEUTROPHILS NFR BLD: 48 %
NRBC BLD-RTO: 0 /100 WBC
PLATELET # BLD AUTO: 330 K/UL
PMV BLD AUTO: 9.4 FL
POTASSIUM SERPL-SCNC: 4.1 MMOL/L
PROT SERPL-MCNC: 6.3 G/DL
RBC # BLD AUTO: 4.59 M/UL
SODIUM SERPL-SCNC: 138 MMOL/L
WBC # BLD AUTO: 11.73 K/UL

## 2018-02-25 PROCEDURE — 97165 OT EVAL LOW COMPLEX 30 MIN: CPT

## 2018-02-25 PROCEDURE — 80053 COMPREHEN METABOLIC PANEL: CPT

## 2018-02-25 PROCEDURE — 85025 COMPLETE CBC W/AUTO DIFF WBC: CPT

## 2018-02-25 PROCEDURE — 83735 ASSAY OF MAGNESIUM: CPT

## 2018-02-25 PROCEDURE — 99239 HOSP IP/OBS DSCHRG MGMT >30: CPT | Mod: ,,, | Performed by: HOSPITALIST

## 2018-02-25 PROCEDURE — 94761 N-INVAS EAR/PLS OXIMETRY MLT: CPT

## 2018-02-25 PROCEDURE — 25000003 PHARM REV CODE 250: Performed by: HOSPITALIST

## 2018-02-25 PROCEDURE — 25000242 PHARM REV CODE 250 ALT 637 W/ HCPCS: Performed by: HOSPITALIST

## 2018-02-25 PROCEDURE — 36415 COLL VENOUS BLD VENIPUNCTURE: CPT

## 2018-02-25 PROCEDURE — 94640 AIRWAY INHALATION TREATMENT: CPT

## 2018-02-25 PROCEDURE — 99239 HOSP IP/OBS DSCHRG MGMT >30: CPT | Mod: ,,, | Performed by: INTERNAL MEDICINE

## 2018-02-25 PROCEDURE — 97535 SELF CARE MNGMENT TRAINING: CPT

## 2018-02-25 RX ORDER — CARVEDILOL 6.25 MG/1
6.25 TABLET ORAL 2 TIMES DAILY WITH MEALS
Qty: 60 TABLET | Refills: 3 | Status: SHIPPED | OUTPATIENT
Start: 2018-02-25 | End: 2018-02-25

## 2018-02-25 RX ORDER — FUROSEMIDE 20 MG/1
20 TABLET ORAL DAILY
Qty: 30 TABLET | Refills: 3 | Status: SHIPPED | OUTPATIENT
Start: 2018-02-25 | End: 2019-11-22

## 2018-02-25 RX ORDER — CARVEDILOL 6.25 MG/1
12.5 TABLET ORAL 2 TIMES DAILY WITH MEALS
Qty: 60 TABLET | Refills: 3 | Status: SHIPPED | OUTPATIENT
Start: 2018-02-25 | End: 2018-08-21 | Stop reason: ALTCHOICE

## 2018-02-25 RX ADMIN — AMLODIPINE BESYLATE 5 MG: 5 TABLET ORAL at 09:02

## 2018-02-25 RX ADMIN — APIXABAN 5 MG: 5 TABLET, FILM COATED ORAL at 09:02

## 2018-02-25 RX ADMIN — ESCITALOPRAM 10 MG: 5 TABLET, FILM COATED ORAL at 09:02

## 2018-02-25 RX ADMIN — IPRATROPIUM BROMIDE AND ALBUTEROL SULFATE 3 ML: .5; 3 SOLUTION RESPIRATORY (INHALATION) at 08:02

## 2018-02-25 RX ADMIN — ATORVASTATIN CALCIUM 20 MG: 20 TABLET, FILM COATED ORAL at 09:02

## 2018-02-25 RX ADMIN — SPIRONOLACTONE 25 MG: 25 TABLET, FILM COATED ORAL at 09:02

## 2018-02-25 RX ADMIN — CAPTOPRIL 50 MG: 50 TABLET ORAL at 09:02

## 2018-02-25 RX ADMIN — CARVEDILOL 6.25 MG: 6.25 TABLET, FILM COATED ORAL at 09:02

## 2018-02-25 RX ADMIN — FUROSEMIDE 20 MG: 20 TABLET ORAL at 09:02

## 2018-02-25 RX ADMIN — LEVOTHYROXINE SODIUM 100 MCG: 100 TABLET ORAL at 06:02

## 2018-02-25 RX ADMIN — IPRATROPIUM BROMIDE AND ALBUTEROL SULFATE 3 ML: .5; 3 SOLUTION RESPIRATORY (INHALATION) at 12:02

## 2018-02-25 RX ADMIN — CLOPIDOGREL 75 MG: 75 TABLET, FILM COATED ORAL at 09:02

## 2018-02-25 RX ADMIN — PANTOPRAZOLE SODIUM 40 MG: 40 TABLET, DELAYED RELEASE ORAL at 09:02

## 2018-02-25 NOTE — PLAN OF CARE
Problem: Occupational Therapy Goal  Goal: Occupational Therapy Goal  Outcome: Outcome(s) achieved Date Met: 02/25/18  OT eval completed. Pt is performing ADL safely and without  A. No further skilled OT services warranted. PEE James'2/25/2018

## 2018-02-25 NOTE — SUBJECTIVE & OBJECTIVE
Interval History: Patient feels well, no overnight complaints or acute vents, ambulated in hallway yesterday with HR increase to 80s-90s.Continues to be in sinus rhythm.      Review of Systems   All other systems reviewed and are negative.    Objective:     Vital Signs (Most Recent):  Temp: 98.1 °F (36.7 °C) (02/25/18 0716)  Pulse: 62 (02/25/18 0851)  Resp: 12 (02/25/18 0851)  BP: (!) 153/68 (02/25/18 0716)  SpO2: (!) 90 % (02/25/18 0851) Vital Signs (24h Range):  Temp:  [97.7 °F (36.5 °C)-98.8 °F (37.1 °C)] 98.1 °F (36.7 °C)  Pulse:  [49-67] 62  Resp:  [12-18] 12  SpO2:  [86 %-98 %] 90 %  BP: (144-177)/(65-74) 153/68     Weight: 74.1 kg (163 lb 5.8 oz)  Body mass index is 29.88 kg/m².     SpO2: (!) 90 %  O2 Device (Oxygen Therapy): room air      Intake/Output Summary (Last 24 hours) at 02/25/18 0927  Last data filed at 02/25/18 0600   Gross per 24 hour   Intake              600 ml   Output              550 ml   Net               50 ml       Lines/Drains/Airways     Peripheral Intravenous Line                 Peripheral IV - Single Lumen 02/23/18 1352 Left Forearm 1 day                Physical Exam   General: alert, awake and oriented x 3  Eyes:PERRL.   Neck:no JVD   Lungs:  clear to auscultation bilaterally and normal respiratory effort  Cardiovascular: Heart: regular rate and rhythm, S1, S2 normal, no murmur, click, rub or gallop.   Chest Wall: no tenderness.   Extremities: no cyanosis or edema.   Pulses: 2+ and symmetric.  Abdomen/Rectal: Abdomen: soft, non-tender non-distented; bowel sounds normal  Skin: No rashes or lesions  Neurologic: Normal strength and tone. No focal numbness or weakness  Psych/Behavioral:  Normal.      Significant Labs:   CMP   Recent Labs  Lab 02/23/18  1723 02/24/18  0444 02/25/18  0458    136 138   K 3.6 4.2 4.1    104 105   CO2 24 24 22*   * 98 97   BUN 29* 32* 26*   CREATININE 1.1 1.3 1.0   CALCIUM 9.1 8.3* 8.6*   PROT  --  5.8* 6.3   ALBUMIN  --  2.9* 3.0*    BILITOT  --  0.7 0.7   ALKPHOS  --  98 108   AST  --  30 29   ALT  --  51* 42   ANIONGAP 10 8 11   ESTGFRAFRICA 51.8* 42.3* 58.1*   EGFRNONAA 44.9* 36.7* 50.4*    and CBC   Recent Labs  Lab 02/24/18  0444 02/25/18  0458   WBC 10.44 11.73   HGB 10.9* 12.2   HCT 32.3* 36.9*    330       Significant Imaging: Echocardiogram:   2D echo with color flow doppler:   Results for orders placed or performed during the hospital encounter of 11/10/17   2D Echo w/ Color Flow Doppler   Result Value Ref Range    EF 55 55 - 65    Mitral Valve Regurgitation MILD     Diastolic Dysfunction Yes (A)     Est. PA Systolic Pressure 66.04 (A)     Tricuspid Valve Regurgitation MODERATE TO SEVERE (A)

## 2018-02-25 NOTE — ASSESSMENT & PLAN NOTE
CHADSVASc of 6. Cardiology following.   - NISHI/Cardioversion 2/23 now in sinus   - eloquis 5mg BID   - contiue coreg reduce to 6.25 BID   - telemetry

## 2018-02-25 NOTE — PROGRESS NOTES
[facility] Hospital Medicine  Progress Note    Patient Name: Symone Lloyd  MRN: [id number]  Patient Class: IP- Inpatient   Admission Date: 2/22/2018  Length of Stay: 2 days  Attending Physician: Suellen Fuentes MD  Primary Care Provider: Amanda Mcdonald MD    [facility] Medicine Team: [facility] HOSP MED K Suellen Fuentes MD    Subjective:     Principal Problem:New onset atrial fibrillation    HPI:  87 y/o woman with HTN, HLD, HFpEF, CAD s/p PCI with SKYLAR to mLAD 8/2015, PCI LCX and D1 2001 with worsening SOB over the last day. Pt states she normally completes her ADL's is able to walk independently with a cane. She states over the last day she has noticed worsening SOB with daily activities and walking. She went to the ED for evaluation and was found to be in afib w/ HR 50-60's today which is a new diagnosis. She denies any chest pain, palpitations, lightheadedness, cough, fevers, chills .         Hospital Course:  Admitted to hospital medicine for newly diagnosed atrial fibrillation.  Cardiology consulted. Pt was diuresed with IV lasix and taken for NISHI/Cardioversion on 2/23 which was successful with conversion to sinus rhythm.     Interval History: Pt feels well this AM. Overall improved. HR has been 50's. Pt walked in the [place] with nursing. HR improved to the 70-80s. Pt pt did have some mild SOB with ambulation but overall pt reports feels close to baseline. Likely DC in the AM.    Review of Systems   Constitutional: Negative for activity change, appetite change, chills, fever and unexpected weight change.   HENT: Negative for rhinorrhea and sore throat.    Eyes: Negative for pain and visual disturbance.   Respiratory: Positive for shortness of breath. Negative for cough.    Cardiovascular: Negative for chest pain and palpitations.   Gastrointestinal: Negative for abdominal pain, diarrhea and nausea.   Genitourinary: Negative for dysuria, flank pain, hematuria, urgency, vaginal discharge and  vaginal pain.   Musculoskeletal: Negative for gait problem and neck stiffness.   Skin: Negative for pallor and rash.   Neurological: Negative for syncope, light-headedness and headaches.     Objective:     Vital Signs (Most Recent):  Temp: 98.1 °F (36.7 °C) (02/24/18 2021)  Pulse: (!) 56 (02/24/18 2021)  Resp: 16 (02/24/18 2010)  BP: (!) 144/65 (02/24/18 2021)  SpO2: 98 % (02/24/18 2021) Vital Signs (24h Range):  Temp:  [97.7 °F (36.5 °C)-98.4 °F (36.9 °C)] 98.1 °F (36.7 °C)  Pulse:  [44-67] 56  Resp:  [16-18] 16  SpO2:  [86 %-98 %] 98 %  BP: (119-168)/(65-81) 144/65     Weight: 74.1 kg (163 lb 5.8 oz)  Body mass index is 29.88 kg/m².    Intake/Output Summary (Last 24 hours) at 02/24/18 2140  Last data filed at 02/24/18 1400   Gross per 24 hour   Intake              420 ml   Output              650 ml   Net             -230 ml      Physical Exam   Constitutional: She is oriented to person, place, and time. She appears well-developed and well-nourished.   Cardiovascular: Normal rate, regular rhythm and normal heart sounds.  Exam reveals no gallop and no friction rub.    No murmur heard.  Pulmonary/Chest: Effort normal and breath sounds normal. No respiratory distress. She has no wheezes. She has no rales.   Abdominal: Soft. Bowel sounds are normal. She exhibits no distension. There is no tenderness.   Musculoskeletal: Normal range of motion. She exhibits edema (trace ).   Neurological: She is alert and oriented to person, place, and time. She displays normal reflexes. No cranial nerve deficit. Coordination normal.   Skin: Skin is warm and dry.       Significant Labs:   BMP:     Recent Labs  Lab 02/24/18  0444   GLU 98      K 4.2      CO2 24   BUN 32*   CREATININE 1.3   CALCIUM 8.3*   MG 1.5*     CBC:     Recent Labs  Lab 02/23/18  0403 02/24/18  0444   WBC 8.63 10.44   HGB 10.3* 10.9*   HCT 31.2* 32.3*    293     CMP:     Recent Labs  Lab 02/23/18  0403 02/23/18  1723 02/24/18  0444    137  136   K 3.8 3.6 4.2    103 104   CO2 23 24 24    112* 98   BUN 32* 29* 32*   CREATININE 1.1 1.1 1.3   CALCIUM 8.6* 9.1 8.3*   PROT 5.6*  --  5.8*   ALBUMIN 2.8*  --  2.9*   BILITOT 0.7  --  0.7   ALKPHOS 99  --  98   AST 40  --  30   ALT 56*  --  51*   ANIONGAP 11 10 8   EGFRNONAA 44.9* 44.9* 36.7*       Significant Imaging: I have reviewed and interpreted all pertinent imaging results/findings within the past 24 hours.    Assessment/Plan:      * New onset atrial fibrillation    CHADSVASc of 6. Cardiology following.   - NISHI/Cardioversion 2/23 now in sinus   - eloquis 5mg BID   - contiue coreg reduce to 6.25 BID   - telemetry           Acute on chronic diastolic (congestive) heart failure    Pt with mild volume overload on exam. Echo from 11/2017 shows EF 55%, PAP 66, diastolic dysfunction. Volume status improved.  Was on lasix 20 IV BID; switch to lasix 20 qd  Continue Coreg 6.25 BID   Cont aldactone 25mg   Continue captopril 50mg   Strict I/O   Daily weights         Depression    Stable. Cont Lexapro           CAD (coronary artery disease): s/p BMS to D1 and LCx 2001; 2.25 SKYLAR to mid LAD 2015.    Stable. Cont plavix, statin, coreg, ACE         Dyslipidemia    Cont home statin           Hypothyroidism    Stable. Cont home synthroid           HTN (hypertension)    Mildly hypertensive. -150s   Continue home amlodipine   Also on coreg, spironolactone, lasix, captopril as above          VTE Risk Mitigation         Ordered     apixaban tablet 5 mg  2 times daily     Route:  Oral        02/22/18 1856     Medium Risk of VTE  Once      02/22/18 1938     Place sequential compression device  Until discontinued      02/22/18 1938              Suellen Fuentes MD  Department of Hospital Medicine   [facility]

## 2018-02-25 NOTE — PT/OT/SLP EVAL
Occupational Therapy   Evaluation and Discharge Note    Name: Symone Lloyd  MRN: [id number]  Admitting Diagnosis:  New onset atrial fibrillation 2 Days Post-Op    Recommendations:     Discharge Recommendations: home  Discharge Equipment Recommendations:  none  Barriers to discharge:  None    History:     Occupational Profile:  Living Environment: lives alone in 2 SH with chair lift to bedroom upstairs  Previous level of function: (I) with ADL; uses Hurrycane for mobility  Roles and Routines: homemaker; no longer drives  Equipment Owned:  cane, straight  Assistance upon Discharge: son can assist    Past Medical History:   Diagnosis Date    A-fib 2/22/2018    Anticoagulant long-term use     Aortic valve disorders     Arthritis     Benign neoplasm of breast     Chest pain, unspecified     CHF (congestive heart failure)     Coronary artery disease     Depression     Diaphragmatic hernia without mention of obstruction or gangrene     Duodenal ulcer, unspecified as acute or chronic, without hemorrhage, perforation, or obstruction     Gastric ulcer, unspecified as acute or chronic, without mention of hemorrhage, perforation, or obstruction     GERD (gastroesophageal reflux disease)     Headache     Heart attack     History of migraine     HLD (hyperlipidemia)     HTN (hypertension)     Memory loss     Microscopic hematuria     Old myocardial infarction     Osteoporosis, unspecified     Personal history of colonic polyps     Primary pulmonary hypertension     Psychiatric problem     PTSD (post-traumatic stress disorder)     Rosacea     Stricture and stenosis of esophagus     Therapy     Unspecified hypothyroidism     Volvulus        Past Surgical History:   Procedure Laterality Date    ANGIOPLASTY      APPENDECTOMY      Open    BREAST BIOPSY      CATARACT EXTRACTION, BILATERAL      COLONOSCOPY W/ POLYPECTOMY      CORONARY ANGIOPLASTY      s/p stents    ESOPHAGOGASTRODUODENOSCOPY      EYE  SURGERY      TONSILLECTOMY, ADENOIDECTOMY      TOTAL ABDOMINAL HYSTERECTOMY      rso, endometriosis       Subjective     Chief Complaint: no complaints  Patient/Family stated goals: to go home  Communicated with: RN prior to session.  Pain/Comfort:  · Pain Rating 1: 0/10  · Pain Rating Post-Intervention 1: 0/10    Patients cultural, spiritual, Synagogue conflicts given the current situation: None reported    Objective:     Patient found with: telemetry    General Precautions: Standard, fall   Orthopedic Precautions:    Braces:       Occupational Performance:    Bed Mobility:    · Patient completed Supine to Sit with modified independence    Functional Mobility/Transfers:  · Patient completed Sit <> Stand Transfer with modified independence  with  no assistive device   · Functional Mobility: CGA to/from bathroom and in hallway without HHA and no AD    Activities of Daily Living:  · Feeding:  modified independence    · Grooming: modified independence    · UB Dressing: modified independence    · LB Dressing: modified independence    · Toileting: modified independence      Cognitive/Visual Perceptual:  Oriented to: Person, Place, Time and Situation  Follows Commands/attention: Follows multistep  commands  Communication: clear/fluent  Memory:  No Deficits noted  Safety awareness/insight to disability: intact  Coping skills/emotional control: Appropriate to situation    Physical Exam:  Postural examination/scapula alignment:    -       No postural abnormalities identified  Sensation:    -       Intact  Upper Extremity Range of Motion:     -       Right Upper Extremity: WFL  -       Left Upper Extremity: WFL  Upper Extremity Strength:    -       Right Upper Extremity: WFL  -       Left Upper Extremity: WFL   Strength:    -       Right Upper Extremity: WFL  -       Left Upper Extremity: WFL  Fine Motor Coordination:    -       Intact  Gross motor coordination:   WFL    Patient left up in chair with all lines intact,  "call button in reach and rn notified    [facility] 6 Click:  [facility] Total Score: 23    Treatment & Education:  Pt ed on OT POC  Pt completed dressing including donning undergarments, shirt, pants; toileting; grooming/hygiene standing at sink and mobility in hallway with mild SOB.   Education:    Assessment:     Symone Lloyd is a 88 y.o. female with a medical diagnosis of New onset atrial fibrillation. At this time, patient is functioning at their prior level of function and does not require further acute OT services.     Clinical Decision Makin.  OT Low:  "Pt evaluation falls under low complexity for evaluation coding due to performance deficits noted in 1-3 areas as stated above and 0 co-morbities affecting current functional status. Data obtained from problem focused assessments. No modifications or assistance was required for completion of evaluation. Only brief occupational profile and history review completed."     Plan:     During this hospitalization, patient does not require further acute OT services.  Please re-consult if situation changes.    · Plan of Care Reviewed with: patient, son    This Plan of care has been discussed with the patient who was involved in its development and understands and is in agreement with the identified goals and treatment plan    GOALS:    Occupational Therapy Goals     Not on file          Multidisciplinary Problems (Resolved)        Problem: Occupational Therapy Goal    Goal Priority Disciplines Outcome Interventions   Occupational Therapy Goal   (Resolved)     OT, PT/OT Outcome(s) achieved                    Time Tracking:     OT Date of Treatment: 18  OT Start Time: 959  OT Stop Time:   OT Total Time (min): 24 min    Billable Minutes:Evaluation 10   Self Care/Home Management 12    PEE James  2018    "

## 2018-02-25 NOTE — ASSESSMENT & PLAN NOTE
- new onset  - s/p NISHI guided DCCV   - c/w eliquis for CVA PPx  -will recommend discharging on coreg 12.5 mg po BID  -ok to discharge per cardiology

## 2018-02-25 NOTE — HOSPITAL COURSE
Admitted to hospital medicine for newly diagnosed atrial fibrillation.  Cardiology consulted. Pt was diuresed with IV lasix and taken for NISHI/Cardioversion on 2/23 which was successful with conversion to sinus rhythm.

## 2018-02-25 NOTE — ASSESSMENT & PLAN NOTE
Pt with mild volume overload on exam. Echo from 11/2017 shows EF 55%, PAP 66, diastolic dysfunction. Volume status improved.  Was on lasix 20 IV BID; switch to lasix 20 qd  Continue Coreg 6.25 BID   Cont aldactone 25mg   Continue captopril 50mg   Strict I/O   Daily weights

## 2018-02-25 NOTE — ASSESSMENT & PLAN NOTE
- Likely precipitated by dietary indiscretions, recent URI, along with new onset Afib  - Cont Aldactone , lasix 20 mg po QD and Norvasc for control of BP  - Educate on salt and fluid restriction

## 2018-02-25 NOTE — PROGRESS NOTES
[facility]  Cardiology  Progress Note    Patient Name: Symone Lloyd  MRN: [id number]  Admission Date: 2/22/2018  Hospital Length of Stay: 3 days  Code Status: Full Code   Attending Physician: Suellen Fuentes MD   Primary Care Physician: Amanda Mcdonald MD  Expected Discharge Date: 2/24/2018  Principal Problem:New onset atrial fibrillation    Subjective:     Hospital Course:   The patient was aggressively diuresed and underwent NISHI guided DCCV and has converted to SR.     Interval History: Patient feels well, no overnight complaints or acute vents, ambulated in hallway yesterday with HR increase to 80s-90s.Continues to be in sinus rhythm.      Review of Systems   All other systems reviewed and are negative.    Objective:     Vital Signs (Most Recent):  Temp: 98.1 °F (36.7 °C) (02/25/18 0716)  Pulse: 62 (02/25/18 0851)  Resp: 12 (02/25/18 0851)  BP: (!) 153/68 (02/25/18 0716)  SpO2: (!) 90 % (02/25/18 0851) Vital Signs (24h Range):  Temp:  [97.7 °F (36.5 °C)-98.8 °F (37.1 °C)] 98.1 °F (36.7 °C)  Pulse:  [49-67] 62  Resp:  [12-18] 12  SpO2:  [86 %-98 %] 90 %  BP: (144-177)/(65-74) 153/68     Weight: 74.1 kg (163 lb 5.8 oz)  Body mass index is 29.88 kg/m².     SpO2: (!) 90 %  O2 Device (Oxygen Therapy): room air      Intake/Output Summary (Last 24 hours) at 02/25/18 0927  Last data filed at 02/25/18 0600   Gross per 24 hour   Intake              600 ml   Output              550 ml   Net               50 ml       Lines/Drains/Airways     Peripheral Intravenous Line                 Peripheral IV - Single Lumen 02/23/18 1352 Left Forearm 1 day                Physical Exam   General: alert, awake and oriented x 3  Eyes:PERRL.   Neck:no JVD   Lungs:  clear to auscultation bilaterally and normal respiratory effort  Cardiovascular: Heart: regular rate and rhythm, S1, S2 normal, no murmur, click, rub or gallop.   Chest Wall: no tenderness.   Extremities: no cyanosis or edema.   Pulses: 2+ and  symmetric.  Abdomen/Rectal: Abdomen: soft, non-tender non-distented; bowel sounds normal  Skin: No rashes or lesions  Neurologic: Normal strength and tone. No focal numbness or weakness  Psych/Behavioral:  Normal.      Significant Labs:   CMP   Recent Labs  Lab 02/23/18  1723 02/24/18  0444 02/25/18  0458    136 138   K 3.6 4.2 4.1    104 105   CO2 24 24 22*   * 98 97   BUN 29* 32* 26*   CREATININE 1.1 1.3 1.0   CALCIUM 9.1 8.3* 8.6*   PROT  --  5.8* 6.3   ALBUMIN  --  2.9* 3.0*   BILITOT  --  0.7 0.7   ALKPHOS  --  98 108   AST  --  30 29   ALT  --  51* 42   ANIONGAP 10 8 11   ESTGFRAFRICA 51.8* 42.3* 58.1*   EGFRNONAA 44.9* 36.7* 50.4*    and CBC   Recent Labs  Lab 02/24/18  0444 02/25/18  0458   WBC 10.44 11.73   HGB 10.9* 12.2   HCT 32.3* 36.9*    330       Significant Imaging: Echocardiogram:   2D echo with color flow doppler:   Results for orders placed or performed during the hospital encounter of 11/10/17   2D Echo w/ Color Flow Doppler   Result Value Ref Range    EF 55 55 - 65    Mitral Valve Regurgitation MILD     Diastolic Dysfunction Yes (A)     Est. PA Systolic Pressure 66.04 (A)     Tricuspid Valve Regurgitation MODERATE TO SEVERE (A)      Assessment and Plan:         A-fib    - new onset  - s/p NISHI guided DCCV   - c/w eliquis for CVA PPx  -will recommend discharging on coreg 12.5 mg po BID  -ok to discharge per cardiology          Acute on chronic diastolic (congestive) heart failure    - Likely precipitated by dietary indiscretions, recent URI, along with new onset Afib  - Cont Aldactone , lasix 20 mg po QD and Norvasc for control of BP  - Educate on salt and fluid restriction              VTE Risk Mitigation         Ordered     apixaban tablet 5 mg  2 times daily     Route:  Oral        02/22/18 1856     Medium Risk of VTE  Once      02/22/18 1938     Place sequential compression device  Until discontinued      02/22/18 1938          Florin Jones MD  Cardiology  [facility]

## 2018-02-26 NOTE — PHYSICIAN QUERY
PT Name: Symone Lloyd  MR #: [id number]    Physician Query Form - Atrial Fibrillation Specificity     CDS/: Elisabet Priest               Contact information: [email or web address]      This form is a permanent document in the medical record.     Query Date: February 26, 2018    By submitting this query, we are merely seeking further clarification of documentation. Please utilize your independent clinical judgment when addressing the question(s) below.    The medical record contains the following:   Indicators     Supporting Clinical Findings Location in Medical Record    Atrial Fibrillation New onset atrial fibrillation   H&P 2/22    EKG results Atrial fibrillation with slow ventricular response  Right superior axis deviation  Intraventricular conduction delay  Anteroseptal infarct (cited on or before 06-DEC-2014)  Abnormal ECG   EKG 2/22    Medication      Treatment      Other         Provider, please further specify the Atrial Fibrillation diagnosis.    [  ] Chronic  [ x ] Paroxysmal  [  ] Permanent  [  ] Persistent  [  ] Other (please specify): ____________________________  [  ] Clinically Undetermined    Please document in your progress notes daily for the duration of treatment until resolved, and include in your discharge summary.

## 2018-03-01 ENCOUNTER — HOSPITAL ENCOUNTER (OUTPATIENT)
Dept: CARDIOLOGY | Facility: CLINIC | Age: 83
Discharge: HOME | End: 2018-03-01
Payer: MEDICARE

## 2018-03-01 DIAGNOSIS — I25.10 CORONARY ARTERY DISEASE, ANGINA PRESENCE UNSPECIFIED, UNSPECIFIED VESSEL OR LESION TYPE, UNSPECIFIED WHETHER NATIVE OR TRANSPLANTED HEART: ICD-10-CM

## 2018-03-01 PROCEDURE — 93000 ELECTROCARDIOGRAM COMPLETE: CPT | Mod: S$GLB,,, | Performed by: INTERNAL MEDICINE

## 2018-03-02 DIAGNOSIS — I25.10 CORONARY ARTERY DISEASE, ANGINA PRESENCE UNSPECIFIED, UNSPECIFIED VESSEL OR LESION TYPE, UNSPECIFIED WHETHER NATIVE OR TRANSPLANTED HEART: Primary | ICD-10-CM

## 2018-03-02 NOTE — DISCHARGE SUMMARY
[facility] Hospital Medicine  Discharge Summary      Patient Name: Symone Lloyd  MRN: [id number]  Admission Date: 2/22/2018  Hospital Length of Stay: 3 days  Discharge Date and Time: 2/25/2018  2:17 PM  Attending Physician: No att. providers found   Discharging Provider: Suellen Fuentes MD  Primary Care Provider: Amanda Mcdonald MD    [facility] Medicine Team: [facility]OSP MED  Suellen Fuentes MD    Principal Problem:New onset atrial fibrillation     HPI:  87 y/o woman with HTN, HLD, HFpEF, CAD s/p PCI with SKYLAR to mLAD 8/2015, PCI LCX and D1 2001 with worsening SOB over the last day. Pt states she normally completes her ADL's is able to walk independently with a cane. She states over the last day she has noticed worsening SOB with daily activities and walking. She went to the ED for evaluation and was found to be in afib w/ HR 50-60's today which is a new diagnosis. She denies any chest pain, palpitations, lightheadedness, cough, fevers, chills .        Procedure(s) (LRB):  CARDIOVERSION (N/A)      Hospital Course:   * New onset atrial fibrillation  Acute on chronic diastolic (congestive) heart failure  Admitted to hospital medicine for newly diagnosed atrial fibrillation. CHADSVASc of 6. Cardiology consulted. Pt was started on eloquis and diuresed with IV lasix. She was  taken for NISHI/Cardioversion on 2/23 which was successful with conversion to sinus rhythm. Pt was then monitored and discharged home to f/u with cardiology and her PCP.     Consults:   Consults         Status Ordering Provider     Inpatient consult to Cardiology  Once     Provider:  (Not yet assigned)    Completed ADRI FISHER          Final Active Diagnoses:    Diagnosis Date Noted POA    PRINCIPAL PROBLEM:  New onset atrial fibrillation [I48.91] 02/22/2018 Yes    Acute on chronic diastolic (congestive) heart failure [I50.33] 02/22/2018 Unknown    A-fib [I48.91] 02/22/2018 Unknown    Depression [F32.9] 09/02/2015 Yes     CAD (coronary artery disease): s/p BMS to D1 and LCx 2001; 2.25 SKYLAR to mid LAD 2015. [I25.10] 12/20/2013 Yes    Dyslipidemia [E78.5] 12/18/2013 Yes    Hypothyroidism [E03.9] 11/29/2013 Yes    HTN (hypertension) [I10] 11/26/2012 Yes      Problems Resolved During this Admission:    Diagnosis Date Noted Date Resolved POA      Discharged Condition: good    Disposition: Home or Self Care    Follow Up:    Patient Instructions:     Ambulatory referral to Cardiology   Referral Priority: Urgent Referral Type: Consultation   Referral Reason: Specialty Services Required    Requested Specialty: Cardiology    Number of Visits Requested: 1      Diet Cardiac     Activity as tolerated       Medications:  Reconciled Home Medications:   Discharge Medication List as of 2/25/2018  1:08 PM      START taking these medications    Details   apixaban 5 mg Tab Take 1 tablet (5 mg total) by mouth 2 (two) times daily., Starting Sun 2/25/2018, Until Thu 4/26/2018, Normal      furosemide (LASIX) 20 MG tablet Take 1 tablet (20 mg total) by mouth once daily., Starting Sun 2/25/2018, Until Mon 2/25/2019, Normal         CONTINUE these medications which have CHANGED    Details   carvedilol (COREG) 6.25 MG tablet Take 2 tablets (12.5 mg total) by mouth 2 (two) times daily with meals., Starting Sun 2/25/2018, Until Tue 3/27/2018, Normal         CONTINUE these medications which have NOT CHANGED    Details   acetaminophen (TYLENOL) 80 MG Chew Take 500 mg by mouth as needed., Until Discontinued, Historical Med      amlodipine (NORVASC) 5 MG tablet Take 1 tablet (5 mg total) by mouth once daily., Starting Mon 8/21/2017, Normal      atorvastatin (LIPITOR) 20 MG tablet Take 4 tablets (80 mg total) by mouth once daily., Starting 3/10/2017, Until Discontinued, Normal      captopril (CAPOTEN) 50 MG tablet Take 50 mg by mouth once daily., Until Discontinued, Historical Med      clopidogrel (PLAVIX) 75 mg tablet Take 1 tablet (75 mg total) by mouth once  daily., Starting Mon 2/5/2018, Normal      escitalopram oxalate (LEXAPRO) 10 MG tablet Starting Sat 9/9/2017, Historical Med      estrogens, conjugated, (PREMARIN) 0.3 MG tablet Take 1 tablet (0.3 mg total) by mouth every evening., Starting 1/22/2016, Until Discontinued, Normal      FLUZONE HIGH-DOSE 2017-18, PF, 180 mcg/0.5 mL vaccine ADMINISTER 0.5ML IN THE MUSCLE AS DIRECTED, Historical Med      levothyroxine (SYNTHROID) 100 MCG tablet TAKE 1 TABLET(100 MCG) BY MOUTH EVERY DAY, Normal      nitroGLYCERIN (NITROSTAT) 0.4 MG SL tablet Place 1 tablet (0.4 mg total) under the tongue every 5 (five) minutes as needed for Chest pain., Starting Fri 1/22/2016, Until Thu 2/22/2018, Normal      omeprazole (PRILOSEC) 20 MG capsule Take 1 capsule (20 mg total) by mouth 2 (two) times daily., Starting 1/22/2016, Until Discontinued, Normal      spironolactone (ALDACTONE) 25 MG tablet Starting Mon 12/18/2017, Historical Med      VIRT-MICHAEL FORTE 2.5-25-2 mg Tab TAKE 1 TABLET BY MOUTH EVERY DAY, Normal             Significant Diagnostic Studies: Cardiac Graphics: Echocardiogram:   2D echo with color flow doppler:   Results for orders placed or performed during the hospital encounter of 11/10/17   2D Echo w/ Color Flow Doppler   Result Value Ref Range    EF 55 55 - 65    Mitral Valve Regurgitation MILD     Diastolic Dysfunction Yes (A)     Est. PA Systolic Pressure 66.04 (A)     Tricuspid Valve Regurgitation MODERATE TO SEVERE (A)        Pending Diagnostic Studies:     None        Indwelling Lines/Drains at time of discharge:   Lines/Drains/Airways          No matching active lines, drains, or airways          Time spent on the discharge of patient: >30 minutes  Patient was seen and examined on the date of discharge and determined to be suitable for discharge.         Suellen Fuentes MD  Department of Hospital Medicine  [facility]

## 2018-04-06 ENCOUNTER — OFFICE VISIT (OUTPATIENT)
Dept: ELECTROPHYSIOLOGY | Facility: CLINIC | Age: 83
End: 2018-04-06
Payer: MEDICARE

## 2018-04-06 ENCOUNTER — HOSPITAL ENCOUNTER (OUTPATIENT)
Dept: CARDIOLOGY | Facility: CLINIC | Age: 83
Discharge: HOME | End: 2018-04-06
Payer: MEDICARE

## 2018-04-06 VITALS
WEIGHT: 165 LBS | BODY MASS INDEX: 30.36 KG/M2 | HEART RATE: 62 BPM | DIASTOLIC BLOOD PRESSURE: 58 MMHG | SYSTOLIC BLOOD PRESSURE: 148 MMHG | HEIGHT: 62 IN

## 2018-04-06 DIAGNOSIS — N18.30 CKD (CHRONIC KIDNEY DISEASE) STAGE 3, GFR 30-59 ML/MIN: ICD-10-CM

## 2018-04-06 DIAGNOSIS — E78.5 DYSLIPIDEMIA: ICD-10-CM

## 2018-04-06 DIAGNOSIS — I48.19 PERSISTENT ATRIAL FIBRILLATION: ICD-10-CM

## 2018-04-06 DIAGNOSIS — I25.10 CORONARY ARTERY DISEASE INVOLVING NATIVE CORONARY ARTERY OF NATIVE HEART WITHOUT ANGINA PECTORIS: ICD-10-CM

## 2018-04-06 DIAGNOSIS — I10 ESSENTIAL HYPERTENSION: Primary | ICD-10-CM

## 2018-04-06 DIAGNOSIS — I25.10 CORONARY ARTERY DISEASE, ANGINA PRESENCE UNSPECIFIED, UNSPECIFIED VESSEL OR LESION TYPE, UNSPECIFIED WHETHER NATIVE OR TRANSPLANTED HEART: ICD-10-CM

## 2018-04-06 PROCEDURE — 93000 ELECTROCARDIOGRAM COMPLETE: CPT | Mod: S$GLB,,, | Performed by: INTERNAL MEDICINE

## 2018-04-06 PROCEDURE — 99213 OFFICE O/P EST LOW 20 MIN: CPT | Mod: S$GLB,,, | Performed by: INTERNAL MEDICINE

## 2018-04-06 PROCEDURE — 99999 PR PBB SHADOW E&M-EST. PATIENT-LVL III: CPT | Mod: PBBFAC,,, | Performed by: INTERNAL MEDICINE

## 2018-04-06 NOTE — PROGRESS NOTES
Subjective:    Patient ID:  Symone Lloyd is a 88 y.o. female who presents for evaluation of Atrial Fibrillation      HPI   88 y.o. F  HFpEF  HTN on meds  HL on meds  CAD/PCI 2001, 2015    Developed new LOYOLA on 2/23. Found in new onset AF with good rate control.  NISHI/DCCV done. Eliquis started.  Feels much better since.   Walks with cane. No SOB/LOYOLA. No CP.    11/17 echo 55-60% LVEF.  TSH wnl    My interpretation of today's ECG is NSR    Review of Systems   Constitution: Negative. Negative for weakness and malaise/fatigue.   HENT: Negative.  Negative for ear pain and tinnitus.    Eyes: Negative for blurred vision.   Cardiovascular: Negative.  Negative for chest pain, dyspnea on exertion, near-syncope, palpitations and syncope.   Respiratory: Negative.  Negative for shortness of breath.    Endocrine: Negative.  Negative for polyuria.   Hematologic/Lymphatic: Does not bruise/bleed easily.   Skin: Negative.  Negative for rash.   Musculoskeletal: Negative.  Negative for joint pain and muscle weakness.   Gastrointestinal: Negative.  Negative for abdominal pain and change in bowel habit.   Genitourinary: Negative for frequency.   Neurological: Negative.  Negative for dizziness.   Psychiatric/Behavioral: Negative.  Negative for depression. The patient is not nervous/anxious.    Allergic/Immunologic: Negative for environmental allergies.        Objective:    Physical Exam   Constitutional: She is oriented to person, place, and time. Vital signs are normal. She appears well-developed and well-nourished. She is active and cooperative.   HENT:   Head: Normocephalic and atraumatic.   Eyes: Conjunctivae and EOM are normal.   Neck: Normal range of motion. Carotid bruit is not present. No tracheal deviation and no edema present. No thyroid mass and no thyromegaly present.   Cardiovascular: Normal rate, regular rhythm, normal heart sounds, intact distal pulses and normal pulses.   No extrasystoles are present. PMI is not displaced.   Exam reveals no gallop and no friction rub.    No murmur heard.  Pulmonary/Chest: Effort normal and breath sounds normal. No respiratory distress. She has no wheezes. She has no rales.   Abdominal: Soft. Normal appearance. She exhibits no distension. There is no hepatosplenomegaly.   Musculoskeletal: Normal range of motion.   Neurological: She is alert and oriented to person, place, and time. Coordination normal.   Skin: Skin is warm and dry. No rash noted.   Psychiatric: She has a normal mood and affect. Her speech is normal and behavior is normal. Thought content normal. Cognition and memory are normal.   Nursing note and vitals reviewed.        Assessment:       1. Essential hypertension    2. Dyslipidemia    3. Coronary artery disease involving native coronary artery of native heart without angina pectoris    4. Persistent atrial fibrillation    5. CKD (chronic kidney disease) stage 3, GFR 30-59 ml/min         Plan:       Discussed AF and its basic pathophysiology, including its health implications and treatment options (rate vs. rhythm control, meds vs. procedural/device treatment) as appropriate for the patient.    Continue eliquis  No AAD at this time, given only one episode of AF. If recurs, consider AAD (likely amio).  Return in 1 year with echo, or earlier prn.

## 2018-04-06 NOTE — LETTER
April 6, 2018      Suellen Fuentes MD  [address]  [facility] [address]           [facility] Arrhythmia  [address]  [facility] [address]  Phone: [phone]  Fax: [phone]          Patient: Symone Lloyd   MR Number: [id number]   YOB: 1929   Date of Visit: 4/6/2018       Dear Dr. Suellen Fuentes:    Thank you for referring Symone Lloyd to me for evaluation. Attached you will find relevant portions of my assessment and plan of care.    If you have questions, please do not hesitate to call me. I look forward to following Symone Lloyd along with you.    Sincerely,    Matt Gambino MD    Enclosure  CC:  No Recipients    If you would like to receive this communication electronically, please contact [email or web address] or [phone] to request more information on [employer] access.    For providers and/or their staff who would like to refer a patient to [facility], please contact us through our one-stop-shop provider referral line, [facility], at [phone].    If you feel you have received this communication in error or would no longer like to receive these types of communications, please e-mail [email or web address]

## 2018-08-21 DIAGNOSIS — I10 HYPERTENSION, UNSPECIFIED TYPE: Primary | ICD-10-CM

## 2018-08-21 RX ORDER — CARVEDILOL 25 MG/1
25 TABLET ORAL 2 TIMES DAILY WITH MEALS
Qty: 180 TABLET | Refills: 3 | Status: SHIPPED | OUTPATIENT
Start: 2018-08-21

## 2018-08-21 RX ORDER — CARVEDILOL 25 MG/1
25 TABLET ORAL 2 TIMES DAILY WITH MEALS
COMMUNITY
End: 2018-08-21 | Stop reason: SDUPTHER

## 2018-11-05 DIAGNOSIS — F33.41 MAJOR DEPRESSIVE DISORDER, RECURRENT EPISODE, IN PARTIAL REMISSION: ICD-10-CM

## 2018-11-05 DIAGNOSIS — E03.9 HYPOTHYROIDISM, UNSPECIFIED TYPE: ICD-10-CM

## 2018-11-05 DIAGNOSIS — F43.21 GRIEF: ICD-10-CM

## 2018-11-06 RX ORDER — LEVOTHYROXINE SODIUM 100 UG/1
TABLET ORAL
Qty: 90 TABLET | Refills: 0 | Status: SHIPPED | OUTPATIENT
Start: 2018-11-06

## 2018-11-06 RX ORDER — ESCITALOPRAM OXALATE 20 MG/1
TABLET ORAL
Qty: 90 TABLET | Refills: 0 | OUTPATIENT
Start: 2018-11-06

## 2018-11-14 NOTE — PROGRESS NOTES
Subjective:   Patient ID:  Symone Lloyd is a 89 y.o. female who presents for follow up of follow up of Coronary Artery Disease (PCI 2001 and 2015); Peripheral Arterial Disease (s/p PTA DCB 2015); Chronic Kidney Disease (CKD 3); Hypertension; Hyperlipidemia; and Pulmonary Hypertension.      Assessment:     1. Atherosclerotic peripheral vascular disease with intermittent claudication : : s/p PTA-DCB to L.SFA : no claudication   2. CAD (coronary artery disease): s/p BMS to D1 and LCx 2001; 2.25 SKYLAR to mid LAD 2015. Without angina.   3. CKD (chronic kidney disease) stage 3, GFR 30-59 ml/min    4. Dyslipidemia : LDL < 100   5. Heart valve disease: nl EF; sev TR, inc RV, RA, and LA size; Mild MR    6. Primary pulmonary hypertension: PA 64 mmHg   7. Essential hypertension : at goal   8. Newt atrial fibrillation: TTE/DCCV success   9. Acute on chronic diastolic (congestive) heart failure : HFpEF: No symptoms of chf       Plan:     1. Cont meds.  2. Encourage activity: walking  3. RTC 1 yr.  4. To see Dr. Woodard after the 1st of the year.         HPI: Mrs. Lloyd comes to clinic in better spirits today than I have seen for some time.  She reports is living alone, which she prefers, but is not very active.  She denies any activity limiting symptoms; no orthopnea, PND, or LOYOLA.   No angina or chest pain.   She walks with a cane because of R. knee arthritis. She is sleeping well and looking forward to the Holidays with her family.      Since her last visit with me, she saw Dr. Christianson, a heart failure specialist who adjusted her medications and noted diastolic heart failure. She was admitted in Feb '18 for new onset AFib and CHF and was cardioverted.     TTEcho today:  · Left ventricular systolic function. The estimated ejection fraction is 58%  · Septal wall has abnormal motion.  · Mild right ventricular enlargement.  · Normal right ventricular systolic function.  · Severe left atrial enlargement.  · Severe right atrial  enlargement.  · Mild mitral regurgitation.  · Severe tricuspid regurgitation.  · Intermediate central venous pressure (8 mm Hg).  · The estimated PA systolic pressure is 63.65 mm Hg  · Pulmonary hypertension present.  · The ascending aorta is borderline-mildly dilated.  · Trivial pericardial effusion.    Review of Systems   Constitution: Negative for diaphoresis.   Cardiovascular: Negative for chest pain, claudication, cyanosis, dyspnea on exertion, irregular heartbeat, leg swelling, near-syncope, orthopnea, palpitations, paroxysmal nocturnal dyspnea and syncope.   Respiratory: Negative for shortness of breath and wheezing.    Musculoskeletal: Positive for arthritis and joint pain.   Neurological: Negative for brief paralysis, dizziness and focal weakness.   All other systems reviewed and are negative.      Past Medical History:   Diagnosis Date    A-fib 2/22/2018    Anticoagulant long-term use     Aortic valve disorders     Arthritis     Benign neoplasm of breast     Chest pain, unspecified     CHF (congestive heart failure)     Coronary artery disease     Depression     Diaphragmatic hernia without mention of obstruction or gangrene     Duodenal ulcer, unspecified as acute or chronic, without hemorrhage, perforation, or obstruction     Gastric ulcer, unspecified as acute or chronic, without mention of hemorrhage, perforation, or obstruction     GERD (gastroesophageal reflux disease)     Headache     Heart attack     History of migraine     HLD (hyperlipidemia)     HTN (hypertension)     Memory loss     Microscopic hematuria     Old myocardial infarction     Osteoporosis, unspecified     Personal history of colonic polyps     Primary pulmonary hypertension     Psychiatric problem     PTSD (post-traumatic stress disorder)     Rosacea     Stricture and stenosis of esophagus     Therapy     Unspecified hypothyroidism     Volvulus        Past Surgical History:   Procedure Laterality Date     ANGIOPLASTY      APPENDECTOMY      Open    BREAST BIOPSY      CARDIOVERSION N/A 2/23/2018    Performed by [facility]affi Brown [facility] at [facility] CATH LAB    CATARACT EXTRACTION, BILATERAL      COLONOSCOPY N/A 12/20/2012    Performed by [facility] at [facility] (4TH FLR)    COLONOSCOPY W/ POLYPECTOMY      CORONARY ANGIOPLASTY      s/p stents    EGD (ESOPHAGOGASTRODUODENOSCOPY) N/A 2/4/2014    Performed by Chau Toney MD at [facility] ENDO (4TH FLR)    EGD (ESOPHAGOGASTRODUODENOSCOPY) N/A 4/23/2013    Performed by Chau Toney MD at [facility] ENDO (4TH FLR)    EGD (ESOPHAGOGASTRODUODENOSCOPY) N/A 12/13/2012    Per[facility] by [facility] at [facility] (4TH FLR)    ESOPHAGOGASTRODUODENOSCOPY      ESOPHAGOGASTRODUODENOSCOPY (EGD) N/A 10/22/2015    Performed by Chau Toney MD at [facility] ENDO (4TH FLR)    ESOPHAGOGASTRODUODENOSCOPY (EGD) N/A 8/8/2015    Performed by [facility] at [facility] (2ND FLR)    EYE SURGERY      GASTROPEXY N/A 3/8/2013    Performed by Harvey Alvarez MD at [facility] OR 2ND FLR    HEART CATH-LEFT Left 8/7/2015    Performed by Kenneth Rivero MD at [facility] CATH LAB    STENT-CORONARY N/A 8/11/2015    Performed by Charan Sung MD at [facility] CATH LAB    TONSILLECTOMY, ADENOIDECTOMY      TOTAL ABDOMINAL HYSTERECTOMY      rso, endometriosis    TRANSESOPHAGEAL ECHOCARDIOGRAM (NISHI) N/A 2/23/2018    Performed by [facility] Diagnostic Provider at [facility] CATH LAB       Social History     Tobacco Use    Smoking status: Never Smoker    Smokeless tobacco: Never Used   Substance Use Topics    Alcohol use: Yes     Alcohol/week: 0.6 oz     Types: 1 Glasses of wine per week     Comment: occ.    Drug use: No       Family History   Problem Relation Age of Onset    Heart failure Mother         d. 81    Heart attack Father         d. 76    Hypertension Father     Heart failure Father     Heart disease Father     No Known Problems Maternal Grandfather     No Known Problems Paternal  Grandmother     No Known Problems Paternal Grandfather     No Known Problems Maternal Aunt     No Known Problems Maternal Uncle     No Known Problems Paternal Aunt     No Known Problems Paternal Uncle     Breast cancer Sister        Current Outpatient Medications   Medication Sig    acetaminophen (TYLENOL) 80 MG Chew Take 500 mg by mouth as needed.    amLODIPine (NORVASC) 10 MG tablet 5 mg once daily.     apixaban (ELIQUIS) 5 mg Tab Take 5 mg by mouth 2 (two) times daily.    atorvastatin (LIPITOR) 20 MG tablet Take 4 tablets (80 mg total) by mouth once daily.    carvedilol (COREG) 25 MG tablet Take 1 tablet (25 mg total) by mouth 2 (two) times daily with meals.    clopidogrel (PLAVIX) 75 mg tablet Take 1 tablet (75 mg total) by mouth once daily.    escitalopram oxalate (LEXAPRO) 10 MG tablet     estrogens, conjugated, (PREMARIN) 0.3 MG tablet Take 1 tablet (0.3 mg total) by mouth every evening.    furosemide (LASIX) 20 MG tablet Take 1 tablet (20 mg total) by mouth once daily.    levothyroxine (SYNTHROID) 100 MCG tablet TAKE 1 TABLET(100 MCG) BY MOUTH EVERY DAY    omeprazole (PRILOSEC) 20 MG capsule Take 1 capsule (20 mg total) by mouth 2 (two) times daily. (Patient taking differently: Take 20 mg by mouth once daily. )    spironolactone (ALDACTONE) 25 MG tablet 25 mg once daily.     VIRT-MICHAEL FORTE 2.5-25-2 mg Tab TAKE 1 TABLET BY MOUTH EVERY DAY    FLUZONE HIGH-DOSE 2017-18, PF, 180 mcg/0.5 mL vaccine ADMINISTER 0.5ML IN THE MUSCLE AS DIRECTED    nitroGLYCERIN (NITROSTAT) 0.4 MG SL tablet Place 1 tablet (0.4 mg total) under the tongue every 5 (five) minutes as needed for Chest pain.     No current facility-administered medications for this visit.        Review of patient's allergies indicates:   Allergen Reactions    Sulfa (sulfonamide antibiotics) Anaphylaxis       Objective:     /60 (BP Location: Right arm, Patient Position: Sitting, BP Method: Large (Automatic))   Pulse (!) 53   Ht  "5' 1.5" (1.562 m)   Wt 72.6 kg (160 lb 0.9 oz)   SpO2 (!) 93%   BMI 29.75 kg/m²     Physical Exam   Constitutional: She is oriented to person, place, and time. She appears well-developed and well-nourished.   HENT:   Head: Normocephalic and atraumatic.   Eyes: Pupils are equal, round, and reactive to light.   Neck: Normal range of motion. Neck supple. No thyromegaly present.   Cardiovascular: Normal rate, regular rhythm, S1 normal and S2 normal.   Murmur heard.   Medium-pitched harsh early systolic murmur is present with a grade of 2/6 at the upper left sternal border.  Pulmonary/Chest: Effort normal and breath sounds normal. No respiratory distress. She has no wheezes. She has no rales.   Abdominal: Soft. Bowel sounds are normal. She exhibits no distension. There is no hepatosplenomegaly. There is no tenderness.   Musculoskeletal: Normal range of motion. She exhibits no edema or tenderness.   Neurological: She is alert and oriented to person, place, and time. No cranial nerve deficit.   Skin: Skin is warm.   Psychiatric: She has a normal mood and affect. Her behavior is normal. Judgment and thought content normal.   Nursing note and vitals reviewed.        Chemistry        Component Value Date/Time     02/25/2018 0458    K 4.1 02/25/2018 0458     02/25/2018 0458    CO2 22 (L) 02/25/2018 0458    BUN 26 (H) 02/25/2018 0458    CREATININE 1.0 02/25/2018 0458    GLU 97 02/25/2018 0458        Component Value Date/Time    CALCIUM 8.6 (L) 02/25/2018 0458    ALKPHOS 108 02/25/2018 0458    AST 29 02/25/2018 0458    ALT 42 02/25/2018 0458    BILITOT 0.7 02/25/2018 0458    ESTGFRAFRICA 58.1 (A) 02/25/2018 0458    EGFRNONAA 50.4 (A) 02/25/2018 0458            Lab Results   Component Value Date    CHOL 167 11/16/2018    CHOL 183 11/10/2017    CHOL 192 07/07/2017     Lab Results   Component Value Date    HDL 52 11/16/2018    HDL 72 11/10/2017    HDL 59 07/07/2017     Lab Results   Component Value Date    LDLCALC " 98.6 11/16/2018    LDLCALC 94.2 11/10/2017    LDLCALC 110.0 07/07/2017     Lab Results   Component Value Date    TRIG 82 11/16/2018    TRIG 84 11/10/2017    TRIG 115 07/07/2017     Lab Results   Component Value Date    CHOLHDL 31.1 11/16/2018    CHOLHDL 39.3 11/10/2017    CHOLHDL 30.7 07/07/2017         Lab Results   Component Value Date     02/25/2018    K 4.1 02/25/2018     02/25/2018    CO2 22 (L) 02/25/2018    BUN 26 (H) 02/25/2018    CREATININE 1.0 02/25/2018    GLU 97 02/25/2018    HGBA1C 5.5 10/31/2017    MG 1.6 02/25/2018    AST 29 02/25/2018    ALT 42 02/25/2018    ALBUMIN 3.0 (L) 02/25/2018    PROT 6.3 02/25/2018    BILITOT 0.7 02/25/2018    WBC 11.73 02/25/2018    HGB 12.2 02/25/2018    HCT 36.9 (L) 02/25/2018    HCT 40 06/07/2017    MCV 80 (L) 02/25/2018     02/25/2018    INR 1.1 02/22/2018    TSH 3.936 02/22/2018    CHOL 167 11/16/2018    HDL 52 11/16/2018    LDLCALC 98.6 11/16/2018    TRIG 82 11/16/2018

## 2018-11-16 ENCOUNTER — OFFICE VISIT (OUTPATIENT)
Dept: CARDIOLOGY | Facility: CLINIC | Age: 83
End: 2018-11-16
Payer: MEDICARE

## 2018-11-16 ENCOUNTER — HOSPITAL ENCOUNTER (OUTPATIENT)
Dept: CARDIOLOGY | Facility: CLINIC | Age: 83
Discharge: HOME | End: 2018-11-16
Attending: INTERNAL MEDICINE
Payer: MEDICARE

## 2018-11-16 VITALS
BODY MASS INDEX: 29.45 KG/M2 | HEIGHT: 62 IN | WEIGHT: 160.06 LBS | SYSTOLIC BLOOD PRESSURE: 131 MMHG | HEART RATE: 53 BPM | OXYGEN SATURATION: 93 % | DIASTOLIC BLOOD PRESSURE: 60 MMHG

## 2018-11-16 VITALS
HEART RATE: 56 BPM | BODY MASS INDEX: 30.36 KG/M2 | DIASTOLIC BLOOD PRESSURE: 80 MMHG | SYSTOLIC BLOOD PRESSURE: 170 MMHG | WEIGHT: 165 LBS | HEIGHT: 62 IN

## 2018-11-16 DIAGNOSIS — I27.0 PRIMARY PULMONARY HYPERTENSION: ICD-10-CM

## 2018-11-16 DIAGNOSIS — I10 ESSENTIAL HYPERTENSION: ICD-10-CM

## 2018-11-16 DIAGNOSIS — I25.10 CORONARY ARTERY DISEASE, ANGINA PRESENCE UNSPECIFIED, UNSPECIFIED VESSEL OR LESION TYPE, UNSPECIFIED WHETHER NATIVE OR TRANSPLANTED HEART: ICD-10-CM

## 2018-11-16 DIAGNOSIS — I50.33 ACUTE ON CHRONIC DIASTOLIC (CONGESTIVE) HEART FAILURE: ICD-10-CM

## 2018-11-16 DIAGNOSIS — N18.30 CKD (CHRONIC KIDNEY DISEASE) STAGE 3, GFR 30-59 ML/MIN: ICD-10-CM

## 2018-11-16 DIAGNOSIS — I25.10 CORONARY ARTERY DISEASE INVOLVING NATIVE CORONARY ARTERY OF NATIVE HEART WITHOUT ANGINA PECTORIS: Primary | ICD-10-CM

## 2018-11-16 DIAGNOSIS — I70.219 ATHEROSCLEROTIC PERIPHERAL VASCULAR DISEASE WITH INTERMITTENT CLAUDICATION: ICD-10-CM

## 2018-11-16 DIAGNOSIS — I38 HEART VALVE DISEASE: ICD-10-CM

## 2018-11-16 DIAGNOSIS — I48.19 PERSISTENT ATRIAL FIBRILLATION: ICD-10-CM

## 2018-11-16 DIAGNOSIS — E78.5 DYSLIPIDEMIA: ICD-10-CM

## 2018-11-16 LAB
ASCENDING AORTA: 3.71 CM
AV MEAN GRADIENT: 5.12 MMHG
AV PEAK GRADIENT: 10.76 MMHG
AV VALVE AREA: 2.41 CM2
BSA FOR ECHO PROCEDURE: 1.81 M2
CV ECHO LV RWT: 0.41 CM
DOP CALC AO PEAK VEL: 1.64 M/S
DOP CALC AO VTI: 37.38 CM
DOP CALC LVOT AREA: 3.27 CM2
DOP CALC LVOT DIAMETER: 2.04 CM
DOP CALC LVOT STROKE VOLUME: 90.26 CM3
DOP CALCLVOT PEAK VEL VTI: 27.63 CM
E WAVE DECELERATION TIME: 283.28 MSEC
E/A RATIO: 0.6
E/E' RATIO: 7.18
ECHO LV POSTERIOR WALL: 0.81 CM (ref 0.6–1.1)
FRACTIONAL SHORTENING: 28 % (ref 28–44)
INTERVENTRICULAR SEPTUM: 0.77 CM (ref 0.6–1.1)
IVRT: 0.11 MSEC
LA MAJOR: 7.02 CM
LA MINOR: 6.98 CM
LA WIDTH: 4.24 CM
LEFT ATRIUM SIZE: 3.8 CM
LEFT ATRIUM VOLUME INDEX: 53 ML/M2
LEFT ATRIUM VOLUME: 95.87 CM3
LEFT INTERNAL DIMENSION IN SYSTOLE: 2.83 CM (ref 2.1–4)
LEFT VENTRICLE DIASTOLIC VOLUME INDEX: 37.61 ML/M2
LEFT VENTRICLE DIASTOLIC VOLUME: 68.08 ML
LEFT VENTRICLE MASS INDEX: 49.7 G/M2
LEFT VENTRICLE SYSTOLIC VOLUME INDEX: 16.8 ML/M2
LEFT VENTRICLE SYSTOLIC VOLUME: 30.38 ML
LEFT VENTRICULAR INTERNAL DIMENSION IN DIASTOLE: 3.95 CM (ref 3.5–6)
LEFT VENTRICULAR MASS: 90.03 G
LV LATERAL E/E' RATIO: 6.78
LV SEPTAL E/E' RATIO: 7.63
MV PEAK A VEL: 1.02 M/S
MV PEAK E VEL: 0.61 M/S
PISA TR MAX VEL: 3.73 M/S
PULM VEIN S/D RATIO: 2.77
PV PEAK D VEL: 0.3 M/S
PV PEAK S VEL: 0.83 M/S
PV PEAK VELOCITY: 0.84 CM/S
RA MAJOR: 7.61 CM
RA PRESSURE: 8 MMHG
RA WIDTH: 5.22 CM
RIGHT VENTRICULAR END-DIASTOLIC DIMENSION: 4.42 CM
RV TISSUE DOPPLER FREE WALL SYSTOLIC VELOCITY 1 (APICAL 4 CHAMBER VIEW): 14.1 M/S
SINUS: 2.93 CM
STJ: 2.63 CM
TDI LATERAL: 0.09
TDI SEPTAL: 0.08
TDI: 0.09
TR MAX PG: 55.65 MMHG
TRICUSPID ANNULAR PLANE SYSTOLIC EXCURSION: 2.36 CM
TV REST PULMONARY ARTERY PRESSURE: 63.65 MMHG

## 2018-11-16 PROCEDURE — 93306 TTE W/DOPPLER COMPLETE: CPT | Mod: S$GLB,,, | Performed by: INTERNAL MEDICINE

## 2018-11-16 PROCEDURE — 99213 OFFICE O/P EST LOW 20 MIN: CPT | Mod: S$GLB,,, | Performed by: INTERNAL MEDICINE

## 2018-11-16 PROCEDURE — 99499 UNLISTED E&M SERVICE: CPT | Mod: S$GLB,,, | Performed by: INTERNAL MEDICINE

## 2018-11-16 PROCEDURE — 99999 PR PBB SHADOW E&M-EST. PATIENT-LVL III: CPT | Mod: PBBFAC,,, | Performed by: INTERNAL MEDICINE

## 2018-11-16 PROCEDURE — 1101F PT FALLS ASSESS-DOCD LE1/YR: CPT | Mod: CPTII,S$GLB,, | Performed by: INTERNAL MEDICINE

## 2018-11-16 RX ORDER — AMLODIPINE BESYLATE 10 MG/1
5 TABLET ORAL DAILY
COMMUNITY
Start: 2018-11-13

## 2019-01-29 NOTE — PROGRESS NOTES
"CC: Annual PE    88 yo female presents for PE  Nonsmoker  Social ETOH , wine only  FHX: + CAD and HTN    MEDS:  reviewed.     SCREENING TESTS:   Chol/lab:pending  C-scope:12/2012, Dr. Mcgarry, + hemorrhoids   denies blood in stool  EGD:2015,   Findings:       One moderate benign-appearing, intrinsic stenosis   s/p  Dilation     Large hiatus hernia was present  The stomach was normal. the ulcer has healed. the clips are still   WWE: SHAREE, has gyn  Mammo: 11/2017  DEXA: 11/2017  Osteopenia of the femoral neck. Adult scoliosis associated with increased lumbar spine BMD.  FRAX calculation does not support treatment for osteoporosis.  Recommendations:  1) Adequate calcium and Vitamin D therapy  2) Appropriate exercise  3) Consider repeat BMD in 2- 4 years    Eye exam: UTD  DDS exam: UTD    VACCINATIONS:   FLU, yearly  Tetanus 06/2006.   Pneumovax 11/2007., 2017   Prevnar, 1/2015  Zostavax, 1/2014  Shingrix, to consider    REVIEW OF SYSTEMS:   Constitutional: No fever, chills, or night sweats.   ENT: No ear pain or pressure, no sinus pain or pressure.    CV: No chest pain, shortness of breath, PND, orthopnea.  + claudication left leg w/ walking, several blocks  RESP: No cough or wheezing.   GI: no nausea, vomiting, or diarrhea    no constipation   : Not having any difficulty urinating, dysuria, or hematuria.   + nocturia x 1 , falls back to sleep easily  MS:  joint swelling hands    + hip pain , thinks hip doing better,   maybe 2/2 to decreased activity, 2019  NEURO: No HA or dizziness    No focal deficits or stroke like sx.   SKIN: No rashes or lesions.   ADL"S: no longer drives, does less cooking, has maid, still helps - washes clothes and cleans her house   pays own bills w/ son and takes care of meds  Memory: Mild loss, delayed recall  Mental Health: spouse d.   Pt prefers to live in  instead of [place],   3 children all reside in [facility]  AD's: + will, + living will, and + POA  Nutrition: Good  Gait: no falls, " 2019  Safety: Intact  Urinary incontinence: +wears a pad, day and night, night worse; stable  ROR negative except as previously noted      PAST MEDICAL HISTORY:   Hypertension of 30+ years,   Pulmonary heart disease,   Thyroid disease 20+ years,   MI in 2000, status post 2 stents   Visual disturbance, cataracts.   Osteoporosis,   Joint pain, s/p inj July 2009, injected w/ Supartz.   Breast lesion that was benign    PHYSICAL EXAM:    VSS:   GENERAL: She is alert and oriented, in no acute distress. She is well   developed, well nourished, conversant, and cooperative.   Pleasant as always  EYES: Conjunctivae and lids unremarkable. Sclerae anicteric.  Rims of the lid pink. Pupils reactive.   ENT: Hearing intact. Canals with slight cerumen.   TM visualized unremarkable.   Nasal mucosa, turbinates, and oropharynx   Unremarkable. Frenulum pink. Sinuses nontender.   NECK: Supple. No thyromegaly or lymphadenopathy noted.   RESPIRATORY: Efforts unlabored.   LUNGS: Clear to auscultation.   HEART: Regular rate and rhythm. No carotid bruits noted,   1+ pedal pulse. No edema.   ABDOMEN: Bowel sounds present, soft, nontender.  No hepatosplenomegaly noted.   MUSCULOSKELETAL: Gait normal. No clubbing, cyanosis, or edema.   NEURO: JAIME. No tremor noted.   SKIN:Warm and dry    IMPRESSION:   Annual PE  FHx: MI  FHx: Breast cancer  Old MI, asx  HTN, mildly elevated- pt son MD will check BP @ home  Primary pulmonary HTN, asx  CKD 3  Afib, asx  HLD, asx  PVD, + claudication intermittent, s/p stent  Hypothyroidism, stable by hx  Heart valve disorders-mod TR and mild MR and mild AR, stable by hx  Anemia, asx  Vitamin D deficiency  Hyperglycemia    PLAN:  HD flu  Lab today  Continue present meds  Call prn  RTC 6 mos EP

## 2019-02-01 ENCOUNTER — LAB VISIT (OUTPATIENT)
Dept: LAB | Facility: HOSPITAL | Age: 84
End: 2019-02-01
Attending: INTERNAL MEDICINE
Payer: MEDICARE

## 2019-02-01 ENCOUNTER — OFFICE VISIT (OUTPATIENT)
Dept: INTERNAL MEDICINE | Facility: CLINIC | Age: 84
End: 2019-02-01
Payer: MEDICARE

## 2019-02-01 VITALS
SYSTOLIC BLOOD PRESSURE: 118 MMHG | RESPIRATION RATE: 20 BRPM | HEART RATE: 59 BPM | WEIGHT: 155.88 LBS | OXYGEN SATURATION: 95 % | HEIGHT: 62 IN | BODY MASS INDEX: 28.69 KG/M2 | TEMPERATURE: 98 F | DIASTOLIC BLOOD PRESSURE: 84 MMHG

## 2019-02-01 DIAGNOSIS — E78.5 DYSLIPIDEMIA: ICD-10-CM

## 2019-02-01 DIAGNOSIS — R73.9 HYPERGLYCEMIA: ICD-10-CM

## 2019-02-01 DIAGNOSIS — D64.9 ANEMIA, UNSPECIFIED TYPE: ICD-10-CM

## 2019-02-01 DIAGNOSIS — E55.9 VITAMIN D DEFICIENCY: ICD-10-CM

## 2019-02-01 DIAGNOSIS — I27.0 PRIMARY PULMONARY HYPERTENSION: ICD-10-CM

## 2019-02-01 DIAGNOSIS — Z00.00 ANNUAL PHYSICAL EXAM: Primary | ICD-10-CM

## 2019-02-01 DIAGNOSIS — N18.30 CKD (CHRONIC KIDNEY DISEASE), STAGE III: ICD-10-CM

## 2019-02-01 DIAGNOSIS — I70.219 ATHEROSCLEROTIC PERIPHERAL VASCULAR DISEASE WITH INTERMITTENT CLAUDICATION: ICD-10-CM

## 2019-02-01 DIAGNOSIS — I48.19 PERSISTENT ATRIAL FIBRILLATION: ICD-10-CM

## 2019-02-01 DIAGNOSIS — E03.9 HYPOTHYROIDISM, UNSPECIFIED TYPE: ICD-10-CM

## 2019-02-01 DIAGNOSIS — I10 ESSENTIAL HYPERTENSION: ICD-10-CM

## 2019-02-01 DIAGNOSIS — I25.2 OLD MI (MYOCARDIAL INFARCTION): ICD-10-CM

## 2019-02-01 LAB
25(OH)D3+25(OH)D2 SERPL-MCNC: 25 NG/ML
ALBUMIN SERPL BCP-MCNC: 3.8 G/DL
ALP SERPL-CCNC: 98 U/L
ALT SERPL W/O P-5'-P-CCNC: 24 U/L
ANION GAP SERPL CALC-SCNC: 8 MMOL/L
AST SERPL-CCNC: 28 U/L
BASOPHILS # BLD AUTO: 0.05 K/UL
BASOPHILS NFR BLD: 0.5 %
BILIRUB SERPL-MCNC: 0.4 MG/DL
BUN SERPL-MCNC: 42 MG/DL
CALCIUM SERPL-MCNC: 9.9 MG/DL
CHLORIDE SERPL-SCNC: 104 MMOL/L
CO2 SERPL-SCNC: 25 MMOL/L
CREAT SERPL-MCNC: 1.1 MG/DL
DIFFERENTIAL METHOD: ABNORMAL
EOSINOPHIL # BLD AUTO: 0.2 K/UL
EOSINOPHIL NFR BLD: 1.6 %
ERYTHROCYTE [DISTWIDTH] IN BLOOD BY AUTOMATED COUNT: 20 %
EST. GFR  (AFRICAN AMERICAN): 51.4 ML/MIN/1.73 M^2
EST. GFR  (NON AFRICAN AMERICAN): 44.6 ML/MIN/1.73 M^2
ESTIMATED AVG GLUCOSE: 120 MG/DL
GLUCOSE SERPL-MCNC: 110 MG/DL
HBA1C MFR BLD HPLC: 5.8 %
HCT VFR BLD AUTO: 37 %
HGB BLD-MCNC: 11.7 G/DL
IMM GRANULOCYTES # BLD AUTO: 0.03 K/UL
IMM GRANULOCYTES NFR BLD AUTO: 0.3 %
IRON SERPL-MCNC: 22 UG/DL
LYMPHOCYTES # BLD AUTO: 4.3 K/UL
LYMPHOCYTES NFR BLD: 39.9 %
MCH RBC QN AUTO: 25.5 PG
MCHC RBC AUTO-ENTMCNC: 31.6 G/DL
MCV RBC AUTO: 81 FL
MONOCYTES # BLD AUTO: 0.9 K/UL
MONOCYTES NFR BLD: 8.3 %
NEUTROPHILS # BLD AUTO: 5.3 K/UL
NEUTROPHILS NFR BLD: 49.4 %
NRBC BLD-RTO: 0 /100 WBC
PLATELET # BLD AUTO: 358 K/UL
PMV BLD AUTO: 10.2 FL
POTASSIUM SERPL-SCNC: 4.4 MMOL/L
PROT SERPL-MCNC: 7.3 G/DL
RBC # BLD AUTO: 4.58 M/UL
SATURATED IRON: 5 %
SODIUM SERPL-SCNC: 137 MMOL/L
TOTAL IRON BINDING CAPACITY: 462 UG/DL
TRANSFERRIN SERPL-MCNC: 312 MG/DL
TSH SERPL DL<=0.005 MIU/L-ACNC: 1.95 UIU/ML
WBC # BLD AUTO: 10.78 K/UL

## 2019-02-01 PROCEDURE — 82306 VITAMIN D 25 HYDROXY: CPT

## 2019-02-01 PROCEDURE — 99999 PR PBB SHADOW E&M-EST. PATIENT-LVL III: CPT | Mod: PBBFAC,,, | Performed by: INTERNAL MEDICINE

## 2019-02-01 PROCEDURE — 90662 FLU VACCINE - HIGH DOSE (65+) PRESERVATIVE FREE IM: ICD-10-PCS | Mod: S$GLB,,, | Performed by: INTERNAL MEDICINE

## 2019-02-01 PROCEDURE — 90662 IIV NO PRSV INCREASED AG IM: CPT | Mod: S$GLB,,, | Performed by: INTERNAL MEDICINE

## 2019-02-01 PROCEDURE — 99397 PER PM REEVAL EST PAT 65+ YR: CPT | Mod: 25,S$GLB,, | Performed by: INTERNAL MEDICINE

## 2019-02-01 PROCEDURE — 99499 UNLISTED E&M SERVICE: CPT | Mod: S$GLB,,, | Performed by: INTERNAL MEDICINE

## 2019-02-01 PROCEDURE — 80053 COMPREHEN METABOLIC PANEL: CPT

## 2019-02-01 PROCEDURE — 85025 COMPLETE CBC W/AUTO DIFF WBC: CPT

## 2019-02-01 PROCEDURE — 99999 PR PBB SHADOW E&M-EST. PATIENT-LVL III: ICD-10-PCS | Mod: PBBFAC,,, | Performed by: INTERNAL MEDICINE

## 2019-02-01 PROCEDURE — 83540 ASSAY OF IRON: CPT

## 2019-02-01 PROCEDURE — 99499 RISK ADDL DX/OHS AUDIT: ICD-10-PCS | Mod: S$GLB,,, | Performed by: INTERNAL MEDICINE

## 2019-02-01 PROCEDURE — G0008 ADMIN INFLUENZA VIRUS VAC: HCPCS | Mod: S$GLB,,, | Performed by: INTERNAL MEDICINE

## 2019-02-01 PROCEDURE — G0008 FLU VACCINE - HIGH DOSE (65+) PRESERVATIVE FREE IM: ICD-10-PCS | Mod: S$GLB,,, | Performed by: INTERNAL MEDICINE

## 2019-02-01 PROCEDURE — 99397 PR PREVENTIVE VISIT,EST,65 & OVER: ICD-10-PCS | Mod: 25,S$GLB,, | Performed by: INTERNAL MEDICINE

## 2019-02-01 PROCEDURE — 36415 COLL VENOUS BLD VENIPUNCTURE: CPT | Mod: PO

## 2019-02-01 PROCEDURE — 83036 HEMOGLOBIN GLYCOSYLATED A1C: CPT

## 2019-02-01 PROCEDURE — 84443 ASSAY THYROID STIM HORMONE: CPT

## 2019-02-01 RX ORDER — CAPTOPRIL 50 MG/1
TABLET ORAL
Refills: 3 | COMMUNITY
Start: 2018-11-15

## 2019-02-04 ENCOUNTER — TELEPHONE (OUTPATIENT)
Dept: INTERNAL MEDICINE | Facility: CLINIC | Age: 84
End: 2019-02-04

## 2019-02-04 DIAGNOSIS — N18.30 CKD (CHRONIC KIDNEY DISEASE) STAGE 3, GFR 30-59 ML/MIN: ICD-10-CM

## 2019-02-04 DIAGNOSIS — D50.0 IRON DEFICIENCY ANEMIA DUE TO CHRONIC BLOOD LOSS: Primary | ICD-10-CM

## 2019-02-04 NOTE — TELEPHONE ENCOUNTER
MY [facility] MESSAGE  Unclear who monitors her account  Please call pt and advise      Thyroid test was normal,  She should continue her present dose of levothyroxine    Vitamin D level is too low, please take Vitamin D 3, two thousand IU daily   3 month average blood sugar, Hemoglobin A1C, was in the Pre-diabetic range, please be cautious with sugar products  And simple carbohydrates ( white bread, white rice, white potatoes and pasta)    Chemistries revealed decreased kidney function , please increase your water intake and avoid  NSAIDS, ( Motrin, Aleve, Advil, ibuprofen and naproxen), you are okay to take Tylenol/acetaminophen for pain or fever    Mildly anemic w/ iron levels that are too low,   recommend taking a daily iron tablet and advise if you see any blood in your stool or urine.     Please mail a FitKit  to check for blood in stool.    Will need to recheck in 4 weeks CBC and iron studies  And 3 mos f/up o/v w/ CMP, CBC, and iron studies

## 2019-02-04 NOTE — TELEPHONE ENCOUNTER
Spoke to pt and daughter-in-law, Gissel. I informed them of results.  Copy of labs mailed to pt.  Fitkit mailed.    Labs ordered and scheduled.  F/u scheduled.

## 2019-04-22 ENCOUNTER — INITIAL CONSULT (OUTPATIENT)
Dept: HEMATOLOGY/ONCOLOGY | Facility: CLINIC | Age: 84
End: 2019-04-22
Payer: MEDICARE

## 2019-04-22 DIAGNOSIS — G31.84 MCI (MILD COGNITIVE IMPAIRMENT): ICD-10-CM

## 2019-04-22 DIAGNOSIS — Z51.5 PALLIATIVE CARE BY SPECIALIST: ICD-10-CM

## 2019-04-22 DIAGNOSIS — I50.33 ACUTE ON CHRONIC DIASTOLIC (CONGESTIVE) HEART FAILURE: Primary | ICD-10-CM

## 2019-04-22 DIAGNOSIS — I48.20 CHRONIC ATRIAL FIBRILLATION: ICD-10-CM

## 2019-04-22 PROCEDURE — 99497 ADVNCD CARE PLAN 30 MIN: CPT | Mod: S$GLB,,, | Performed by: INTERNAL MEDICINE

## 2019-04-22 PROCEDURE — 99497 PR ADVNCD CARE PLAN 30 MIN: ICD-10-PCS | Mod: S$GLB,,, | Performed by: INTERNAL MEDICINE

## 2019-04-22 PROCEDURE — 3288F PR FALLS RISK ASSESSMENT DOCUMENTED: ICD-10-PCS | Mod: CPTII,S$GLB,, | Performed by: INTERNAL MEDICINE

## 2019-04-22 PROCEDURE — 1100F PTFALLS ASSESS-DOCD GE2>/YR: CPT | Mod: CPTII,S$GLB,, | Performed by: INTERNAL MEDICINE

## 2019-04-22 PROCEDURE — 1100F PR PT FALLS ASSESS DOC 2+ FALLS/FALL W/INJURY/YR: ICD-10-PCS | Mod: CPTII,S$GLB,, | Performed by: INTERNAL MEDICINE

## 2019-04-22 PROCEDURE — 99999 PR PBB SHADOW E&M-EST. PATIENT-LVL III: ICD-10-PCS | Mod: PBBFAC,,, | Performed by: INTERNAL MEDICINE

## 2019-04-22 PROCEDURE — 99999 PR PBB SHADOW E&M-EST. PATIENT-LVL III: CPT | Mod: PBBFAC,,, | Performed by: INTERNAL MEDICINE

## 2019-04-22 PROCEDURE — 99499 UNLISTED E&M SERVICE: CPT | Mod: S$GLB,,, | Performed by: INTERNAL MEDICINE

## 2019-04-22 PROCEDURE — 99205 PR OFFICE/OUTPT VISIT, NEW, LEVL V, 60-74 MIN: ICD-10-PCS | Mod: 25,S$GLB,, | Performed by: INTERNAL MEDICINE

## 2019-04-22 PROCEDURE — 3288F FALL RISK ASSESSMENT DOCD: CPT | Mod: CPTII,S$GLB,, | Performed by: INTERNAL MEDICINE

## 2019-04-22 PROCEDURE — 99499 RISK ADDL DX/OHS AUDIT: ICD-10-PCS | Mod: S$GLB,,, | Performed by: INTERNAL MEDICINE

## 2019-04-22 PROCEDURE — 99205 OFFICE O/P NEW HI 60 MIN: CPT | Mod: 25,S$GLB,, | Performed by: INTERNAL MEDICINE

## 2019-04-22 NOTE — LETTER
April 25, 2019      Patrick Carrillo MD  [address]  [facility] [address]           [facility] Hematology Oncology  [address]  [facility] [address]  Phone: [phone]          Patient: Symone Lloyd   MR Number: [id number]   YOB: 1929   Date of Visit: 4/22/2019       Dear Dr. Patrick Carrillo:    Thank you for referring Symone Lloyd to me for evaluation. Attached you will find relevant portions of my assessment and plan of care.    If you have questions, please do not hesitate to call me. I look forward to following Symone Lloyd along with you.    Sincerely,    Bhavya Engle MD    Enclosure  CC:  No Recipients    If you would like to receive this communication electronically, please contact [email or web address] or [phone] to request more information on [employer] Link access.    For providers and/or their staff who would like to refer a patient to [facility], please contact us through our one-stop-shop provider referral line, [facility], at [phone].    If you feel you have received this communication in error or would no longer like to receive these types of communications, please e-mail [email or web address]

## 2019-04-23 DIAGNOSIS — D50.8 OTHER IRON DEFICIENCY ANEMIA: ICD-10-CM

## 2019-04-23 DIAGNOSIS — I50.33 ACUTE ON CHRONIC DIASTOLIC (CONGESTIVE) HEART FAILURE: Primary | ICD-10-CM

## 2019-04-24 ENCOUNTER — TELEPHONE (OUTPATIENT)
Dept: HEMATOLOGY/ONCOLOGY | Facility: CLINIC | Age: 84
End: 2019-04-24

## 2019-04-24 NOTE — TELEPHONE ENCOUNTER
----- Message from Bhavya Engle MD sent at 4/24/2019  7:33 AM CDT -----  I did a home visit on this patient on Monday 4/22. Can you make her an appointment and check her in for 4 pm.  Thanks  s

## 2019-04-25 VITALS
RESPIRATION RATE: 16 BRPM | HEART RATE: 64 BPM | DIASTOLIC BLOOD PRESSURE: 59 MMHG | SYSTOLIC BLOOD PRESSURE: 136 MMHG | TEMPERATURE: 98 F

## 2019-04-25 PROBLEM — Z51.5 PALLIATIVE CARE BY SPECIALIST: Status: ACTIVE | Noted: 2019-04-25

## 2019-04-25 NOTE — PROGRESS NOTES
"Consult Note  Palliative Care      Consult Requested By: Dr. Patrick Carrillo  Reason for Consult: Heart failure and goals of care.    ASSESSMENT/PLAN:     Plan/Recommendations:  Symone was seen today for congestive heart failure.    Diagnoses and all orders for this visit:    Acute on chronic diastolic (congestive) heart failure  Continue current regimen of Lasix 20 mg daily with spironolactone 25 mg on Monday and Thursday. Takes occasional extra dose of Lasix. Son will assess tomorrow to see if she needs any additional meds.   Chronic A fib with bradycardia-able to do her ADL. Continue to monitor.  MCI (mild cognitive impairment)  Not currently interfering with activity or ability to be managed in the home setting. She has 24 hour supervision. Son is looking for a home and then will consider additional help in home.  Palliative care by specialist  Patient and son are very clear that the patient wishes to avoid hospitalizations if possible. She did agree to have ED visit for acute events such as fall with fracture or need for laceration repair. She wants to be managed in the home. She is accepting of palliative principles and hospice when needed. Discussed having Roxanol available and son declines at this time.      Ethical / Legal Advance Care Planning      - surrogate decision maker: Name:Aj Lloyd MD, Relationship: son.   - Code Status: DNR.    - LaPOST: completed at this visit. She would like DNR, selective treatment and no FT but fluids for a reversible problem    - other advance directive: yes, Capacity to make medical decisions: yes, Conflicts:  None  The completed LaPOST document will be uploaded into the chart. DNR will be entered as an order.         SUBJECTIVE:     History of Present Illness:  Patient is a 89 y.o. year old female presenting with mild shortness of breath with activity. She is closely monitored by her son who is currently living with her. She has "companions" during the day who assist " with meals and some ADL. Her memory is worsening but mostly able to recall details of past and current life. Son says she is occasionally confused. She sleeps well. Was tired from the weekend and going to the park. She uses rollator for activity.     Past Medical History:   Diagnosis Date    A-fib 2/22/2018    Anticoagulant long-term use     Aortic valve disorders     Arthritis     Benign neoplasm of breast     CHF (congestive heart failure)     Coronary artery disease     Depression     Diaphragmatic hernia without mention of obstruction or gangrene     Duodenal ulcer, unspecified as acute or chronic, without hemorrhage, perforation, or obstruction     Gastric ulcer, unspecified as acute or chronic, without mention of hemorrhage, perforation, or obstruction     GERD (gastroesophageal reflux disease)     Headache     History of migraine     HTN (hypertension)     Memory loss     Microscopic hematuria     Old myocardial infarction     Osteoporosis, unspecified     Personal history of colonic polyps     Primary pulmonary hypertension     Psychiatric problem     PTSD (post-traumatic stress disorder)     Rosacea     Stricture and stenosis of esophagus     Volvulus      Past Surgical History:   Procedure Laterality Date    ANGIOPLASTY      APPENDECTOMY      Open    BREAST BIOPSY      CARDIOVERSION N/A 2/23/2018    Performed by [facility] at [facility] CATH LAB    CATARACT EXTRACTION, BILATERAL      COLONOSCOPY N/A 12/20/2012    Performed by [facility] at [facility] (4TH FLR)    COLONOSCOPY W/ POLYPECTOMY      CORONARY ANGIOPLASTY      s/p stents    EGD (ESOPHAGOGASTRODUODENOSCOPY) N/A 2/4/2014    Performed by Chau Toney MD at [facility] ENDO (4TH FLR)    EGD (ESOPHAGOGASTRODUODENOSCOPY) N/A 4/23/2013    Performed by [facility] MD at [facility] ENDO (4TH FLR)    EGD (ESOPHAGOGASTRODUODENOSCOPY) N/A 12/13/2012    Performed by [facility] MD at [facility] ENDO (4TH FLR)     ESOPHAGOGASTRODUODENOSCOPY      ESOPHAGOGASTRODUODENOSCOPY (EGD) N/A 10/22/2015    Performed by [facility] at [facility] ENDO (4TH FLR)    ESOPHAGOGASTRODUODENOSCOPY (EGD) N/A 8/8/2015    Performed by Chau Toney MD at [facility] ENDO (2ND FLR)    EYE SURGERY      GASTROPEXY N/A 3/8/2013    Performed by Harvey Alvarez MD at [facility] OR 2ND FLR    HEART CATH-LEFT Left 8/7/2015    Performed by Kenneth Rivero MD at [facility] CATH LAB    STENT-CORONARY N/A 8/11/2015    Performed by Charan Sung MD at [facility] CATH LAB    TONSILLECTOMY, ADENOIDECTOMY      TOTAL ABDOMINAL HYSTERECTOMY      rso, endometriosis    TRANSESOPHAGEAL ECHOCARDIOGRAM (NISHI) N/A 2/23/2018    Performed by [facility] Diagnostic Provider at [facility] CATH LAB     Family History   Problem Relation Age of Onset    Heart failure Mother         d. 81    Heart attack Father         d. 76    Hypertension Father     Heart failure Father     Heart disease Father     No Known Problems Maternal Grandfather     No Known Problems Paternal Grandmother     No Known Problems Paternal Grandfather     Breast cancer Sister         lives in [place], 10 yrs younger    No Known Problems Son         lives in  @ [employer]- MD- Pediatric- Head of Dept    No Known Problems Son         lives in  @ [employer]- Surgeon-    No Known Problems Daughter         lives in [facility], head of [place] [employer]     Review of patient's allergies indicates:   Allergen Reactions    Sulfa (sulfonamide antibiotics) Anaphylaxis       Medications:    Current Outpatient Medications:     acetaminophen (TYLENOL) 80 MG Chew, Take 500 mg by mouth as needed., Disp: , Rfl:     amLODIPine (NORVASC) 10 MG tablet, 5 mg once daily. , Disp: , Rfl:     apixaban (ELIQUIS) 5 mg Tab, Take 5 mg by mouth 2 (two) times daily., Disp: , Rfl:     atorvastatin (LIPITOR) 20 MG tablet, Take 4 tablets (80 mg total) by mouth once daily., Disp: 360 tablet, Rfl: 4    captopril (CAPOTEN) 50 MG tablet, TK 1 T PO QD, Disp:  , Rfl: 3    carvedilol (COREG) 25 MG tablet, Take 1 tablet (25 mg total) by mouth 2 (two) times daily with meals., Disp: 180 tablet, Rfl: 3    clopidogrel (PLAVIX) 75 mg tablet, Take 1 tablet (75 mg total) by mouth once daily., Disp: 90 tablet, Rfl: 4    escitalopram oxalate (LEXAPRO) 10 MG tablet, , Disp: , Rfl: 0    estrogens, conjugated, (PREMARIN) 0.3 MG tablet, Take 1 tablet (0.3 mg total) by mouth every evening., Disp: 90 tablet, Rfl: 4    FLUZONE HIGH-DOSE 2017-18, PF, 180 mcg/0.5 mL vaccine, ADMINISTER 0.5ML IN THE MUSCLE AS DIRECTED, Disp: , Rfl: 0    furosemide (LASIX) 20 MG tablet, Take 1 tablet (20 mg total) by mouth once daily., Disp: 30 tablet, Rfl: 3    levothyroxine (SYNTHROID) 100 MCG tablet, TAKE 1 TABLET(100 MCG) BY MOUTH EVERY DAY, Disp: 90 tablet, Rfl: 0    nitroGLYCERIN (NITROSTAT) 0.4 MG SL tablet, Place 1 tablet (0.4 mg total) under the tongue every 5 (five) minutes as needed for Chest pain., Disp: 90 tablet, Rfl: 4    omeprazole (PRILOSEC) 20 MG capsule, Take 1 capsule (20 mg total) by mouth 2 (two) times daily. (Patient taking differently: Take 20 mg by mouth once daily. ), Disp: 180 capsule, Rfl: 4    spironolactone (ALDACTONE) 25 MG tablet, 25 mg once daily. , Disp: , Rfl:     VIRT-MICHAEL FORTE 2.5-25-2 mg Tab, TAKE 1 TABLET BY MOUTH EVERY DAY, Disp: 90 tablet, Rfl: 0      24h Oral Morphine Equivalents (OME): none    OBJECTIVE:   Symptom Assessment (ESAS 0-10 scale)     ESAS 0 1 2 3 4 5 6 7 8 9 10   Pain x             Dyspnea   x           Anxiety x             Nausea x             Depression  x             Anorexia x             Fatigue  x            Insomnia x             Restlessness  x             Agitation              Constipation    no  Bowel Management Plan (BMP): yes  Diarrhea        no  Comments:   Performance Status: PPS Score 60  ROS:  Review of Systems   Constitutional: Positive for fatigue.   HENT: Negative.    Eyes: Negative.    Respiratory: Positive for shortness  of breath.    Cardiovascular: Negative.  Negative for chest pain.   Gastrointestinal: Negative.    Endocrine: Negative.    Genitourinary: Negative.    Musculoskeletal: Negative.  Negative for arthralgias.   Skin: Negative.    Allergic/Immunologic: Negative.    Neurological: Negative.    Hematological: Negative.    Psychiatric/Behavioral: Positive for confusion.        Son reports some confusion. Today seems on task.       Physical Exam:  Vitals: Temp: 98 °F (36.7 °C) (04/22/19 1845)  Pulse: 64 (04/22/19 1845)  Resp: 16 (04/22/19 1845)  BP: (!) 136/59 (04/22/19 1845)  Physical Exam   Constitutional: She is oriented to person, place, and time and well-developed, well-nourished, and in no distress.   HENT:   Head: Normocephalic.   Nose: Nose normal.   Mouth/Throat: Oropharynx is clear and moist.   Eyes: Pupils are equal, round, and reactive to light. Conjunctivae and EOM are normal.   Neck: Normal range of motion. Neck supple.   Cardiovascular: Intact distal pulses. An irregularly irregular rhythm present. Bradycardia present.   Murmur heard.   Systolic murmur is present with a grade of 2/6.  Pulmonary/Chest: Effort normal and breath sounds normal.   Abdominal: Bowel sounds are normal.   Musculoskeletal: Normal range of motion.   Neurological: She is alert and oriented to person, place, and time. Gait normal.   Skin: Skin is warm and dry.   2+ pitting edema in both legs symmetric.   Psychiatric: Mood, memory, affect and judgment normal.   Nursing note and vitals reviewed.      Labs:  CBC:   WBC   Date Value Ref Range Status   02/01/2019 10.78 3.90 - 12.70 K/uL Final       Hemoglobin   Date Value Ref Range Status   02/01/2019 11.7 (L) 12.0 - 16.0 g/dL Final       POC Hematocrit   Date Value Ref Range Status   06/07/2017 40 36 - 54 %PCV Final     Hematocrit   Date Value Ref Range Status   02/01/2019 37.0 37.0 - 48.5 % Final       MCV   Date Value Ref Range Status   02/01/2019 81 (L) 82 - 98 fL Final       Platelets    Date Value Ref Range Status   02/01/2019 358 (H) 150 - 350 K/uL Final           LFT:   Lab Results   Component Value Date    AST 28 02/01/2019    ALKPHOS 98 02/01/2019    BILITOT 0.4 02/01/2019       Albumin:   Albumin   Date Value Ref Range Status   02/01/2019 3.8 3.5 - 5.2 g/dL Final     Protein:   Total Protein   Date Value Ref Range Status   02/01/2019 7.3 6.0 - 8.4 g/dL Final       Radiology:None    Psychosocial/Cultural/Spiritual: Very involved with family. 2 sons in town. Frequent visitors.    F- Meghan and Belief [employer]     I - Importance some  .  C - Community not involved    A - Address in Care yes      Total visit in home   1 hour. Advance care planning discussion and completion of forms 20 minutes.  Signature: Bhavya Engle MD

## 2019-05-21 ENCOUNTER — TELEPHONE (OUTPATIENT)
Dept: ELECTROPHYSIOLOGY | Facility: CLINIC | Age: 84
End: 2019-05-21

## 2019-05-21 DIAGNOSIS — I25.10 CORONARY ARTERY DISEASE, ANGINA PRESENCE UNSPECIFIED, UNSPECIFIED VESSEL OR LESION TYPE, UNSPECIFIED WHETHER NATIVE OR TRANSPLANTED HEART: Primary | ICD-10-CM

## 2019-05-28 ENCOUNTER — OFFICE VISIT (OUTPATIENT)
Dept: ELECTROPHYSIOLOGY | Facility: CLINIC | Age: 84
End: 2019-05-28
Payer: MEDICARE

## 2019-05-28 ENCOUNTER — HOSPITAL ENCOUNTER (OUTPATIENT)
Dept: CARDIOLOGY | Facility: CLINIC | Age: 84
Discharge: HOME | End: 2019-05-28
Payer: MEDICARE

## 2019-05-28 VITALS
HEART RATE: 56 BPM | HEIGHT: 64 IN | WEIGHT: 150.81 LBS | SYSTOLIC BLOOD PRESSURE: 160 MMHG | DIASTOLIC BLOOD PRESSURE: 64 MMHG | BODY MASS INDEX: 25.75 KG/M2

## 2019-05-28 DIAGNOSIS — N18.30 CKD (CHRONIC KIDNEY DISEASE) STAGE 3, GFR 30-59 ML/MIN: ICD-10-CM

## 2019-05-28 DIAGNOSIS — I10 ESSENTIAL HYPERTENSION: ICD-10-CM

## 2019-05-28 DIAGNOSIS — I70.219 ATHEROSCLEROTIC PERIPHERAL VASCULAR DISEASE WITH INTERMITTENT CLAUDICATION: ICD-10-CM

## 2019-05-28 DIAGNOSIS — I25.10 CORONARY ARTERY DISEASE INVOLVING NATIVE CORONARY ARTERY OF NATIVE HEART WITHOUT ANGINA PECTORIS: ICD-10-CM

## 2019-05-28 DIAGNOSIS — I48.20 CHRONIC ATRIAL FIBRILLATION: Primary | ICD-10-CM

## 2019-05-28 DIAGNOSIS — R00.1 BRADYCARDIA: ICD-10-CM

## 2019-05-28 DIAGNOSIS — I38 HEART VALVE DISEASE: ICD-10-CM

## 2019-05-28 DIAGNOSIS — I25.10 CORONARY ARTERY DISEASE, ANGINA PRESENCE UNSPECIFIED, UNSPECIFIED VESSEL OR LESION TYPE, UNSPECIFIED WHETHER NATIVE OR TRANSPLANTED HEART: ICD-10-CM

## 2019-05-28 PROCEDURE — 1101F PR PT FALLS ASSESS DOC 0-1 FALLS W/OUT INJ PAST YR: ICD-10-PCS | Mod: CPTII,S$GLB,, | Performed by: INTERNAL MEDICINE

## 2019-05-28 PROCEDURE — 1101F PT FALLS ASSESS-DOCD LE1/YR: CPT | Mod: CPTII,S$GLB,, | Performed by: INTERNAL MEDICINE

## 2019-05-28 PROCEDURE — 99205 OFFICE O/P NEW HI 60 MIN: CPT | Mod: S$GLB,,, | Performed by: INTERNAL MEDICINE

## 2019-05-28 PROCEDURE — 99999 PR PBB SHADOW E&M-EST. PATIENT-LVL III: ICD-10-PCS | Mod: PBBFAC,,, | Performed by: INTERNAL MEDICINE

## 2019-05-28 PROCEDURE — 93005 RHYTHM STRIP: ICD-10-PCS | Mod: S$GLB,,, | Performed by: INTERNAL MEDICINE

## 2019-05-28 PROCEDURE — 99205 PR OFFICE/OUTPT VISIT, NEW, LEVL V, 60-74 MIN: ICD-10-PCS | Mod: S$GLB,,, | Performed by: INTERNAL MEDICINE

## 2019-05-28 PROCEDURE — 93010 ELECTROCARDIOGRAM REPORT: CPT | Mod: S$GLB,,, | Performed by: INTERNAL MEDICINE

## 2019-05-28 PROCEDURE — 93005 ELECTROCARDIOGRAM TRACING: CPT | Mod: S$GLB,,, | Performed by: INTERNAL MEDICINE

## 2019-05-28 PROCEDURE — 93010 RHYTHM STRIP: ICD-10-PCS | Mod: S$GLB,,, | Performed by: INTERNAL MEDICINE

## 2019-05-28 PROCEDURE — 99999 PR PBB SHADOW E&M-EST. PATIENT-LVL III: CPT | Mod: PBBFAC,,, | Performed by: INTERNAL MEDICINE
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
## 2019-06-05 ENCOUNTER — CLINICAL SUPPORT (OUTPATIENT)
Dept: CARDIOLOGY | Facility: HOSPITAL | Age: 84
End: 2019-06-05
Attending: INTERNAL MEDICINE
Payer: MEDICARE

## 2019-06-05 DIAGNOSIS — R00.1 BRADYCARDIA: ICD-10-CM

## 2019-06-05 PROCEDURE — 93227 HOLTER MONITOR - 24 HOUR (CUPID ONLY): ICD-10-PCS | Mod: ,,, | Performed by: INTERNAL MEDICINE

## 2019-06-05 PROCEDURE — 93227 XTRNL ECG REC<48 HR R&I: CPT | Mod: ,,, | Performed by: INTERNAL MEDICINE

## 2019-06-05 PROCEDURE — 93226 XTRNL ECG REC<48 HR SCAN A/R: CPT

## 2019-06-10 LAB
OHS CV EVENT MONITOR DAY: 0
OHS CV HOLTER LENGTH DECIMAL HOURS: 24
OHS CV HOLTER LENGTH HOURS: 24
OHS CV HOLTER LENGTH MINUTES: 0

## 2019-06-14 ENCOUNTER — TELEPHONE (OUTPATIENT)
Dept: ELECTROPHYSIOLOGY | Facility: CLINIC | Age: 84
End: 2019-06-14

## 2019-06-14 NOTE — TELEPHONE ENCOUNTER
Spoke with patient and her son,  Aj Lloyd. We scheduled PPM Implant for 7/25/2019. Will email all information to him as requested.

## 2019-06-14 NOTE — TELEPHONE ENCOUNTER
----- Message from Pilo Kerr MD sent at 6/14/2019 10:52 AM CDT -----  Looks like she needs a pacemaker. DC PM, rotation is fine. Hold apixaban 2 days prior

## 2019-07-11 DIAGNOSIS — R00.1 SINUS BRADYCARDIA: Primary | ICD-10-CM

## 2019-07-11 DIAGNOSIS — I49.9 CARDIAC ARRHYTHMIA, UNSPECIFIED CARDIAC ARRHYTHMIA TYPE: ICD-10-CM

## 2019-08-02 ENCOUNTER — HOME CARE VISIT (OUTPATIENT)
Dept: HEMATOLOGY/ONCOLOGY | Facility: CLINIC | Age: 84
End: 2019-08-02
Payer: MEDICARE

## 2019-08-02 DIAGNOSIS — Z51.5 PALLIATIVE CARE BY SPECIALIST: ICD-10-CM

## 2019-08-02 DIAGNOSIS — I50.33 ACUTE ON CHRONIC DIASTOLIC (CONGESTIVE) HEART FAILURE: Primary | ICD-10-CM

## 2019-08-02 PROCEDURE — 99499 UNLISTED E&M SERVICE: CPT | Mod: S$PBB,,, | Performed by: INTERNAL MEDICINE

## 2019-08-02 PROCEDURE — 99499 RISK ADDL DX/OHS AUDIT: ICD-10-PCS | Mod: S$PBB,,, | Performed by: INTERNAL MEDICINE

## 2019-08-02 RX ORDER — MORPHINE SULFATE 20 MG/ML
10 SOLUTION ORAL
Qty: 30 ML | Refills: 0 | Status: SHIPPED | OUTPATIENT
Start: 2019-08-02 | End: 2020-08-01

## 2019-08-02 NOTE — PROGRESS NOTES
Patient seen at home by son Dr. Lloyd.   Having left shoulder pain and not feeling well.  VS ok  Pain is in left bicep. She is anxious.Has CHF  Will get RX for roxanol 20 mg/ml 0.5 ml every 2 hours as needed for pain. RX sent to [facility] main pharmacy.  Patient has a palliative plan of care.

## 2019-09-09 DIAGNOSIS — I25.10 CORONARY ARTERY DISEASE INVOLVING NATIVE CORONARY ARTERY OF NATIVE HEART WITHOUT ANGINA PECTORIS: Primary | ICD-10-CM

## 2019-11-20 NOTE — PROGRESS NOTES
Subjective:   Patient ID:  Symone Lloyd is a 89 y.o. female who presents for follow up of Congestive Heart Failure; Coronary Artery Disease; Peripheral Arterial Disease; Chronic Kidney Disease; and Hypertension      Assessment:     1. Coronary artery disease involving native coronary artery of native heart without angina pectoris    2. Atherosclerotic peripheral vascular disease with intermittent claudication : : s/p PTA-DCB to L.SFA    3. Dyslipidemia : LDL at goal   4. CKD (chronic kidney disease) stage 3, GFR 30-59 ml/min : renal function stable   5. Depression, unspecified depression type    6. Acute on chronic diastolic (congestive) heart failure    7. Essential hypertension : at goal   8. Primary pulmonary hypertension    9. Heart valve disease: mod-severe TR, Mild AR and MR        Plan:     1. Discuss with Dr. Christianson the addition of entresto for managing CHF (LOYOLA) symptoms.  2. Cont meds and return as needed.      HPI: Mrs. Lloyd was in very good spirits today and had no specific complaints of angina or claudication. She is walking with a cane.  Her son (Dr. Aj Lloyd) reports that she intermittently has increased amounts of pedal edema and SOB for which he titrates her lasix with good results.  He is asking if there is any role for entresto for the management of her symptoms.  She has decided to pursue a palliative care option at home and is meeting with a home hospice nurse this afternoon. Several weeks ago she had an episode of irregular heart beats, that may have been afib, and her beta blocker was increased for rate control.     11/2018 2D ECHO:CONCLUSIONS     1 - Normal left ventricular systolic function (EF 55-60%). The inferior septum is mildly hypokinetic    2 - Impaired LV relaxation, elevated LAP (grade 2 diastolic dysfunction).     3 - Normal right ventricular systolic function .     4 - Pulmonary hypertension. The estimated PA systolic pressure is 66 mmHg.     5 - Mild mitral  regurgitation.     6 - Moderate to severe tricuspid regurgitation. Tricuspid annulus is dilated.     7 - Biatrial enlargement.      Review of Systems   Constitution: Negative for diaphoresis.   Cardiovascular: Positive for dyspnea on exertion, leg swelling and palpitations. Negative for chest pain, claudication, cyanosis, irregular heartbeat, near-syncope and orthopnea.   Respiratory: Positive for shortness of breath. Negative for wheezing.    All other systems reviewed and are negative.      Past Medical History:   Diagnosis Date    A-fib 2/22/2018    Anticoagulant long-term use     Aortic valve disorders     Arthritis     Benign neoplasm of breast     CHF (congestive heart failure)     Coronary artery disease     Depression     Diaphragmatic hernia without mention of obstruction or gangrene     Duodenal ulcer, unspecified as acute or chronic, without hemorrhage, perforation, or obstruction     Gastric ulcer, unspecified as acute or chronic, without mention of hemorrhage, perforation, or obstruction     GERD (gastroesophageal reflux disease)     Headache     History of migraine     HTN (hypertension)     Memory loss     Microscopic hematuria     Old myocardial infarction     Osteoporosis, unspecified     Personal history of colonic polyps     Primary pulmonary hypertension     Psychiatric problem     PTSD (post-traumatic stress disorder)     Rosacea     Stricture and stenosis of esophagus     Volvulus        Past Surgical History:   Procedure Laterality Date    ANGIOPLASTY      APPENDECTOMY      Open    BREAST BIOPSY      CATARACT EXTRACTION, BILATERAL      COLONOSCOPY W/ POLYPECTOMY      CORONARY ANGIOPLASTY      s/p stents    ESOPHAGOGASTRODUODENOSCOPY      EYE SURGERY      TONSILLECTOMY, ADENOIDECTOMY      TOTAL ABDOMINAL HYSTERECTOMY      rso, endometriosis       Social History     Tobacco Use    Smoking status: Never Smoker    Smokeless tobacco: Never Used   Substance Use  Topics    Alcohol use: Yes     Alcohol/week: 1.0 standard drinks     Types: 1 Glasses of wine per week     Comment: occ.    Drug use: No       Family History   Problem Relation Age of Onset    Heart failure Mother         d. 81    Heart attack Father         d. 76    Hypertension Father     Heart failure Father     Heart disease Father     No Known Problems Maternal Grandfather     No Known Problems Paternal Grandmother     No Known Problems Paternal Grandfather     Breast cancer Sister         lives in [place], 10 yrs younger    No Known Problems Son         lives in  @ [employer]- MD- Pediatric- Head of Dept    No Known Problems Son         lives in  @ [employer]- Surgeon-    No Known Problems Daughter         lives in [facility], head of [place] [employer]       Current Outpatient Medications   Medication Sig    acetaminophen (TYLENOL) 80 MG Chew Take 500 mg by mouth as needed.    amLODIPine (NORVASC) 10 MG tablet 5 mg once daily.     apixaban (ELIQUIS) 5 mg Tab Take 5 mg by mouth 2 (two) times daily.    atorvastatin (LIPITOR) 20 MG tablet Take 4 tablets (80 mg total) by mouth once daily.    captopril (CAPOTEN) 50 MG tablet TK 1 T PO QD    carvedilol (COREG) 25 MG tablet Take 1 tablet (25 mg total) by mouth 2 (two) times daily with meals.    clopidogrel (PLAVIX) 75 mg tablet Take 1 tablet (75 mg total) by mouth once daily.    escitalopram oxalate (LEXAPRO) 10 MG tablet     estrogens, conjugated, (PREMARIN) 0.3 MG tablet Take 1 tablet (0.3 mg total) by mouth every evening.    FLUZONE HIGH-DOSE 2017-18, PF, 180 mcg/0.5 mL vaccine ADMINISTER 0.5ML IN THE MUSCLE AS DIRECTED    furosemide (LASIX) 20 MG tablet Take 1 tablet (20 mg total) by mouth once daily. (Patient taking differently: Take 40 mg by mouth once daily. Taking 40mg's 3 times a week and other days 20mg's)    levothyroxine (SYNTHROID) 100 MCG tablet TAKE 1 TABLET(100 MCG) BY MOUTH EVERY DAY    morphine 100 mg/5 mL (20 mg/mL) concentrated solution  "Take 0.5 mLs (10 mg total) by mouth every 2 (two) hours as needed for Pain. pallative patient.    nitroGLYCERIN (NITROSTAT) 0.4 MG SL tablet Place 1 tablet (0.4 mg total) under the tongue every 5 (five) minutes as needed for Chest pain.    omeprazole (PRILOSEC) 20 MG capsule Take 1 capsule (20 mg total) by mouth 2 (two) times daily. (Patient taking differently: Take 20 mg by mouth once daily. )    spironolactone (ALDACTONE) 25 MG tablet 25 mg once daily.     VIRT-MICHAEL FORTE 2.5-25-2 mg Tab TAKE 1 TABLET BY MOUTH EVERY DAY     No current facility-administered medications for this visit.        Review of patient's allergies indicates:   Allergen Reactions    Sulfa (sulfonamide antibiotics) Anaphylaxis       Objective:     /63 (BP Location: Left arm, Patient Position: Sitting, BP Method: Large (Automatic))   Pulse 64   Ht 5' 4" (1.626 m)   Wt 69.4 kg (153 lb)   SpO2 (!) 93%   BMI 26.26 kg/m²     Physical Exam   Constitutional: She is oriented to person, place, and time. She appears well-developed and well-nourished.   HENT:   Head: Normocephalic and atraumatic.   Eyes: Pupils are equal, round, and reactive to light.   Neck: Normal range of motion. Neck supple. No thyromegaly present.   Cardiovascular: Normal rate, regular rhythm, S1 normal, S2 normal and intact distal pulses.   Murmur heard.  High-pitched blowing holosystolic murmur is present with a grade of 2/6 at the apex. Mild edema of left ankle and foot.  Pulses:       Carotid pulses are 2+ on the right side, and 2+ on the left side.       Radial pulses are 2+ on the right side, and 2+ on the left side.   Pulmonary/Chest: Effort normal and breath sounds normal. No respiratory distress. She has no wheezes. She has no rales.   Abdominal: Soft. Bowel sounds are normal. She exhibits no distension. There is no hepatosplenomegaly. There is no tenderness.   Musculoskeletal: Normal range of motion. She exhibits no edema or tenderness.   Neurological: She " is alert and oriented to person, place, and time. No cranial nerve deficit.   Skin: Skin is warm.   Psychiatric: She has a normal mood and affect. Her behavior is normal. Judgment and thought content normal.   Nursing note and vitals reviewed.        Chemistry        Component Value Date/Time     11/22/2019 1013    K 3.9 11/22/2019 1013     11/22/2019 1013    CO2 29 11/22/2019 1013    BUN 28 (H) 11/22/2019 1013    CREATININE 1.1 11/22/2019 1013     11/22/2019 1013        Component Value Date/Time    CALCIUM 9.4 11/22/2019 1013    ALKPHOS 98 02/01/2019 1500    AST 28 02/01/2019 1500    ALT 24 02/01/2019 1500    BILITOT 0.4 02/01/2019 1500    ESTGFRAFRICA 51.1 (A) 11/22/2019 1013    EGFRNONAA 44.3 (A) 11/22/2019 1013            Lab Results   Component Value Date    CHOL 144 11/22/2019    CHOL 167 11/16/2018    CHOL 183 11/10/2017     Lab Results   Component Value Date    HDL 47 11/22/2019    HDL 52 11/16/2018    HDL 72 11/10/2017     Lab Results   Component Value Date    LDLCALC 80.4 11/22/2019    LDLCALC 98.6 11/16/2018    LDLCALC 94.2 11/10/2017     Lab Results   Component Value Date    TRIG 83 11/22/2019    TRIG 82 11/16/2018    TRIG 84 11/10/2017     Lab Results   Component Value Date    CHOLHDL 32.6 11/22/2019    CHOLHDL 31.1 11/16/2018    CHOLHDL 39.3 11/10/2017         Lab Results   Component Value Date     11/22/2019    K 3.9 11/22/2019     11/22/2019    CO2 29 11/22/2019    BUN 28 (H) 11/22/2019    CREATININE 1.1 11/22/2019     11/22/2019    HGBA1C 5.8 (H) 02/01/2019    MG 1.6 02/25/2018    AST 28 02/01/2019    ALT 24 02/01/2019    ALBUMIN 3.8 02/01/2019    PROT 7.3 02/01/2019    BILITOT 0.4 02/01/2019    WBC 10.78 02/01/2019    HGB 11.7 (L) 02/01/2019    HCT 37.0 02/01/2019    HCT 40 06/07/2017    MCV 81 (L) 02/01/2019     (H) 02/01/2019    INR 1.1 02/22/2018    TSH 1.953 02/01/2019    CHOL 144 11/22/2019    HDL 47 11/22/2019    LDLCALC 80.4 11/22/2019    TRIG 83  11/22/2019

## 2019-11-22 ENCOUNTER — OFFICE VISIT (OUTPATIENT)
Dept: CARDIOLOGY | Facility: CLINIC | Age: 84
End: 2019-11-22
Payer: MEDICARE

## 2019-11-22 ENCOUNTER — LAB VISIT (OUTPATIENT)
Dept: LAB | Facility: HOSPITAL | Age: 84
End: 2019-11-22
Attending: INTERNAL MEDICINE
Payer: MEDICARE

## 2019-11-22 VITALS
WEIGHT: 153 LBS | HEIGHT: 64 IN | BODY MASS INDEX: 26.12 KG/M2 | SYSTOLIC BLOOD PRESSURE: 129 MMHG | DIASTOLIC BLOOD PRESSURE: 63 MMHG | OXYGEN SATURATION: 93 % | HEART RATE: 64 BPM

## 2019-11-22 DIAGNOSIS — I25.10 CORONARY ARTERY DISEASE INVOLVING NATIVE CORONARY ARTERY OF NATIVE HEART WITHOUT ANGINA PECTORIS: ICD-10-CM

## 2019-11-22 DIAGNOSIS — F32.A DEPRESSION, UNSPECIFIED DEPRESSION TYPE: ICD-10-CM

## 2019-11-22 DIAGNOSIS — I38 HEART VALVE DISEASE: ICD-10-CM

## 2019-11-22 DIAGNOSIS — I25.10 CORONARY ARTERY DISEASE INVOLVING NATIVE CORONARY ARTERY OF NATIVE HEART WITHOUT ANGINA PECTORIS: Primary | ICD-10-CM

## 2019-11-22 DIAGNOSIS — I70.219 ATHEROSCLEROTIC PERIPHERAL VASCULAR DISEASE WITH INTERMITTENT CLAUDICATION: ICD-10-CM

## 2019-11-22 DIAGNOSIS — I50.33 ACUTE ON CHRONIC DIASTOLIC (CONGESTIVE) HEART FAILURE: ICD-10-CM

## 2019-11-22 DIAGNOSIS — I10 ESSENTIAL HYPERTENSION: ICD-10-CM

## 2019-11-22 DIAGNOSIS — E78.5 DYSLIPIDEMIA: ICD-10-CM

## 2019-11-22 DIAGNOSIS — N18.30 CKD (CHRONIC KIDNEY DISEASE) STAGE 3, GFR 30-59 ML/MIN: ICD-10-CM

## 2019-11-22 DIAGNOSIS — I27.0 PRIMARY PULMONARY HYPERTENSION: ICD-10-CM

## 2019-11-22 LAB
ANION GAP SERPL CALC-SCNC: 8 MMOL/L (ref 8–16)
BUN SERPL-MCNC: 28 MG/DL (ref 8–23)
CALCIUM SERPL-MCNC: 9.4 MG/DL (ref 8.7–10.5)
CHLORIDE SERPL-SCNC: 102 MMOL/L (ref 95–110)
CHOLEST SERPL-MCNC: 144 MG/DL (ref 120–199)
CHOLEST/HDLC SERPL: 3.1 {RATIO} (ref 2–5)
CO2 SERPL-SCNC: 29 MMOL/L (ref 23–29)
CREAT SERPL-MCNC: 1.1 MG/DL (ref 0.5–1.4)
EST. GFR  (AFRICAN AMERICAN): 51.1 ML/MIN/1.73 M^2
EST. GFR  (NON AFRICAN AMERICAN): 44.3 ML/MIN/1.73 M^2
GLUCOSE SERPL-MCNC: 100 MG/DL (ref 70–110)
HDLC SERPL-MCNC: 47 MG/DL (ref 40–75)
HDLC SERPL: 32.6 % (ref 20–50)
LDLC SERPL CALC-MCNC: 80.4 MG/DL (ref 63–159)
NONHDLC SERPL-MCNC: 97 MG/DL
POTASSIUM SERPL-SCNC: 3.9 MMOL/L (ref 3.5–5.1)
SODIUM SERPL-SCNC: 139 MMOL/L (ref 136–145)
TRIGL SERPL-MCNC: 83 MG/DL (ref 30–150)

## 2019-11-22 PROCEDURE — 80061 LIPID PANEL: CPT

## 2019-11-22 PROCEDURE — 1101F PT FALLS ASSESS-DOCD LE1/YR: CPT | Mod: CPTII,S$GLB,, | Performed by: INTERNAL MEDICINE

## 2019-11-22 PROCEDURE — 1159F MED LIST DOCD IN RCRD: CPT | Mod: S$GLB,,, | Performed by: INTERNAL MEDICINE

## 2019-11-22 PROCEDURE — 80048 BASIC METABOLIC PNL TOTAL CA: CPT

## 2019-11-22 PROCEDURE — 99499 UNLISTED E&M SERVICE: CPT | Mod: S$GLB,,, | Performed by: INTERNAL MEDICINE

## 2019-11-22 PROCEDURE — 1126F PR PAIN SEVERITY QUANTIFIED, NO PAIN PRESENT: ICD-10-PCS | Mod: S$GLB,,, | Performed by: INTERNAL MEDICINE

## 2019-11-22 PROCEDURE — 1101F PR PT FALLS ASSESS DOC 0-1 FALLS W/OUT INJ PAST YR: ICD-10-PCS | Mod: CPTII,S$GLB,, | Performed by: INTERNAL MEDICINE

## 2019-11-22 PROCEDURE — 99999 PR PBB SHADOW E&M-EST. PATIENT-LVL III: CPT | Mod: PBBFAC,,, | Performed by: INTERNAL MEDICINE

## 2019-11-22 PROCEDURE — 99499 RISK ADDL DX/OHS AUDIT: ICD-10-PCS | Mod: S$GLB,,, | Performed by: INTERNAL MEDICINE

## 2019-11-22 PROCEDURE — 99213 PR OFFICE/OUTPT VISIT, EST, LEVL III, 20-29 MIN: ICD-10-PCS | Mod: S$GLB,,, | Performed by: INTERNAL MEDICINE

## 2019-11-22 PROCEDURE — 1126F AMNT PAIN NOTED NONE PRSNT: CPT | Mod: S$GLB,,, | Performed by: INTERNAL MEDICINE

## 2019-11-22 PROCEDURE — 36415 COLL VENOUS BLD VENIPUNCTURE: CPT

## 2019-11-22 PROCEDURE — 99999 PR PBB SHADOW E&M-EST. PATIENT-LVL III: ICD-10-PCS | Mod: PBBFAC,,, | Performed by: INTERNAL MEDICINE

## 2019-11-22 PROCEDURE — 1159F PR MEDICATION LIST DOCUMENTED IN MEDICAL RECORD: ICD-10-PCS | Mod: S$GLB,,, | Performed by: INTERNAL MEDICINE

## 2019-11-22 PROCEDURE — 99213 OFFICE O/P EST LOW 20 MIN: CPT | Mod: S$GLB,,, | Performed by: INTERNAL MEDICINE

## 2020-01-09 ENCOUNTER — DOCUMENTATION ONLY (OUTPATIENT)
Dept: PALLIATIVE MEDICINE | Facility: CLINIC | Age: 85
End: 2020-01-09

## 2020-01-09 DIAGNOSIS — I50.33 ACUTE ON CHRONIC DIASTOLIC (CONGESTIVE) HEART FAILURE: ICD-10-CM

## 2020-01-09 DIAGNOSIS — Z51.5 PALLIATIVE CARE BY SPECIALIST: Primary | ICD-10-CM

## 2020-01-09 DIAGNOSIS — G31.84 MCI (MILD COGNITIVE IMPAIRMENT): ICD-10-CM

## 2020-01-09 NOTE — PROGRESS NOTES
Advance Care Planning   20 minute discussion with son over the phone yesterday afternoon.  His mother has end-stage heart disease with dyspnea on exertion as well as orthopnea.  He would like her enrolled in [facility] and they have agreed to enrollment next Monday.  He also talked about having her see Dr. Mahad Flores for her pulmonary hypertension.  The patient however does not want any intervention done.  She has a LaPOST document. She will need adjustment of the LaPOST document to reflect comfort focused treatment from selective treatments.She wants to remain in her own home until the time of her death.  Dr. Lloyd has Roxanol available for use.  I instructed him to have her use 0.25 mL at the onset of shortness of breath and may repeat in 15 min.  We also talked about using long-acting MS Contin at night if all of her symptoms are at bedtime ordering sleep.  He is agreeable to evaluating his use.

## 2020-10-05 ENCOUNTER — PATIENT MESSAGE (OUTPATIENT)
Dept: ADMINISTRATIVE | Facility: HOSPITAL | Age: 85
End: 2020-10-05

## 2021-01-04 ENCOUNTER — PATIENT MESSAGE (OUTPATIENT)
Dept: ADMINISTRATIVE | Facility: HOSPITAL | Age: 86
End: 2021-01-04

## 2021-04-05 ENCOUNTER — PATIENT MESSAGE (OUTPATIENT)
Dept: ADMINISTRATIVE | Facility: HOSPITAL | Age: 86
End: 2021-04-05

## 2021-07-06 ENCOUNTER — PATIENT MESSAGE (OUTPATIENT)
Dept: ADMINISTRATIVE | Facility: HOSPITAL | Age: 86
End: 2021-07-06

## 2021-07-06 NOTE — HPI
87 y/o woman with HTN, HLD, HFpEF, CAD s/p PCI with SKYLAR to mLAD 8/2015, PCI LCX and D1 2001 with worsening SOB over the last day. Pt states she normally completes her ADL's is able to walk independently with a cane. She states over the last day she has noticed worsening SOB with daily activities and walking. She went to the ED for evaluation and was found to be in afib w/ HR 50-60's today which is a new diagnosis. She denies any chest pain, palpitations, lightheadedness, cough, fevers, chills .        show

## 2021-10-04 ENCOUNTER — PATIENT MESSAGE (OUTPATIENT)
Dept: ADMINISTRATIVE | Facility: HOSPITAL | Age: 86
End: 2021-10-04

## 2022-01-25 ENCOUNTER — PATIENT MESSAGE (OUTPATIENT)
Dept: ADMINISTRATIVE | Facility: HOSPITAL | Age: 87
End: 2022-01-25
Payer: MEDICARE

## 2023-12-04 NOTE — ED PROVIDER NOTES
Encounter Date: 2/22/2018    SCRIBE #1 NOTE: I, Lazaro Malin, am scribing for, and in the presence of,  Dr. Cervantes. I have scribed the entire note.       History     Chief Complaint   Patient presents with    Shortness of Breath     Pt c/o SOB & dizziness.  Onset last night.  Pt denies chest pain.      Time patient was seen by the provider: 12:20 PM      The patient is a 88 y.o. female with co-morbidities including: HTN who presents to the ED with a complaint of shortness of breath, which began this morning. Pt's caretaker noticed shortness of breath as well as wheezing this morning. No history of COPD or asthma. Pt's son also reports rhinorrhea, postnasal drip, and cough which have persisted for 4-5 days. She has had recent sick contact with son who was diagnosed with the flu 2 weeks ago. She has taken tamiflu and zpak. No dysuria, constipation, diarrhea, abdominal pain, nausea, vomiting, or urine color change.       The history is provided by the patient, medical records and a relative.     Review of patient's allergies indicates:   Allergen Reactions    Sulfa (sulfonamide antibiotics) Anaphylaxis     Past Medical History:   Diagnosis Date    A-fib 2/22/2018    Aortic valve disorders     Arthritis     Benign neoplasm of breast     Chest pain, unspecified     CHF (congestive heart failure)     Coronary artery disease     Depression     Diaphragmatic hernia without mention of obstruction or gangrene     Duodenal ulcer, unspecified as acute or chronic, without hemorrhage, perforation, or obstruction     Gastric ulcer, unspecified as acute or chronic, without mention of hemorrhage, perforation, or obstruction     GERD (gastroesophageal reflux disease)     Headache     Heart attack     History of migraine     HLD (hyperlipidemia)     HTN (hypertension)     Memory loss     Microscopic hematuria     Old myocardial infarction     Osteoporosis, unspecified     Personal history of colonic polyps      Primary pulmonary hypertension     Psychiatric problem     PTSD (post-traumatic stress disorder)     Rosacea     Stricture and stenosis of esophagus     Therapy     Unspecified hypothyroidism     Volvulus      Past Surgical History:   Procedure Laterality Date    ANGIOPLASTY      APPENDECTOMY      Open    BREAST BIOPSY      CATARACT EXTRACTION, BILATERAL      COLONOSCOPY W/ POLYPECTOMY      CORONARY ANGIOPLASTY      s/p stents    ESOPHAGOGASTRODUODENOSCOPY      TONSILLECTOMY, ADENOIDECTOMY      TOTAL ABDOMINAL HYSTERECTOMY      rso, endometriosis     Family History   Problem Relation Age of Onset    Heart failure Mother      d. 81    Heart attack Father      d. 76    Hypertension Father     Heart failure Father     Heart disease Father     No Known Problems Maternal Grandfather     No Known Problems Paternal Grandmother     No Known Problems Paternal Grandfather     No Known Problems Maternal Aunt     No Known Problems Maternal Uncle     No Known Problems Paternal Aunt     No Known Problems Paternal Uncle     Breast cancer Sister      Social History   Substance Use Topics    Smoking status: Never Smoker    Smokeless tobacco: Never Used    Alcohol use 0.6 oz/week     1 Glasses of wine per week      Comment: wine - nightly     Review of Systems   Constitutional: Negative for fever.   HENT: Positive for postnasal drip and rhinorrhea. Negative for sore throat.    Respiratory: Positive for cough, shortness of breath and wheezing.    Cardiovascular: Negative for chest pain.   Gastrointestinal: Negative for abdominal pain, constipation, diarrhea, nausea and vomiting.   Genitourinary: Negative for dysuria.   Musculoskeletal: Negative for back pain.   Skin: Negative for rash.   Neurological: Negative for weakness.   Hematological: Does not bruise/bleed easily.       Physical Exam     Initial Vitals [02/22/18 1118]   BP Pulse Resp Temp SpO2   (!) 100/59 68 20 97.7 °F (36.5 °C) 95 %      MAP        72.67         Physical Exam    Nursing note and vitals reviewed.  Constitutional: She appears well-developed and well-nourished. No distress.   HENT:   Head: Normocephalic and atraumatic.   Posterior erythema. Uvula is midline. Mucous membrane mildly dry    Neck: Normal range of motion. Neck supple. No JVD present.   Cardiovascular: Regular rhythm, normal heart sounds and intact distal pulses.   Irregularly irregular    Pulmonary/Chest: Breath sounds normal. No respiratory distress. She has no rhonchi. She has no rales.   Faint expiratory wheezing    Abdominal: Soft. She exhibits no distension. There is no tenderness.   Musculoskeletal: Normal range of motion.   Trace edema bilaterally    Lymphadenopathy:     She has no cervical adenopathy.   Neurological: She is alert and oriented to person, place, and time. She has normal strength. No cranial nerve deficit or sensory deficit.   Skin: Skin is warm and dry.         ED Course   Procedures  Labs Reviewed   CBC W/ AUTO DIFFERENTIAL - Abnormal; Notable for the following:        Result Value    Hemoglobin 11.2 (*)     Hematocrit 34.5 (*)     RDW 17.6 (*)     Immature Grans (Abs) 0.06 (*)     All other components within normal limits   COMPREHENSIVE METABOLIC PANEL - Abnormal; Notable for the following:     Sodium 135 (*)     CO2 18 (*)     Glucose 121 (*)     BUN, Bld 33 (*)     Albumin 3.2 (*)     AST 52 (*)     ALT 63 (*)     eGFR if  58.1 (*)     eGFR if non  50.4 (*)     All other components within normal limits   URINALYSIS, REFLEX TO URINE CULTURE - Abnormal; Notable for the following:     Occult Blood UA 1+ (*)     All other components within normal limits    Narrative:     Preferred Collection Type->Urine, Clean Catch   B-TYPE NATRIURETIC PEPTIDE - Abnormal; Notable for the following:      (*)     All other components within normal limits   URINALYSIS MICROSCOPIC - Abnormal; Notable for the following:     Hyaline  Casts, UA 18 (*)     All other components within normal limits    Narrative:     Preferred Collection Type->Urine, Clean Catch   TROPONIN I   TSH   PROTIME-INR   INFLUENZA A AND B ANTIGEN     EKG Readings: (Independently Interpreted)   New onset a-fib with slow ventricular response q waves in the anterior leads, changes when compared to June 7, 2017          Medical Decision Making:   History:   Old Medical Records: I decided to obtain old medical records.  Initial Assessment:   Emergent evaluation of shortness of breath. My initial differential diagnoses include but are not limited to: new onset a-fib, arrhythmia, pneumonia, bronchitis, flu, electrolyte derangement. Labs, EKG, chest x-ray ordered     4:21 PM  Cardiology evaluated pt. They are recommending admission to medicine for new onset a-fib and hypervolemia   Independently Interpreted Test(s):   I have ordered and independently interpreted EKG Reading(s) - see prior notes  Clinical Tests:   Lab Tests: Ordered and Reviewed  Radiological Study: Ordered and Reviewed  Medical Tests: Ordered and Reviewed            Scribe Attestation:   Scribe #1: I performed the above scribed service and the documentation accurately describes the services I performed. I attest to the accuracy of the note.    Attending Attestation:           Physician Attestation for Scribe:      Comments: I, Dr. Tita Cervantes, personally performed the services described in this documentation. All medical record entries made by the scribe were at my direction and in my presence.  I have reviewed the chart and agree that the record reflects my personal performance and is accurate and complete. Tita Cervantes MD.                 Clinical Impression:   The primary encounter diagnosis was New onset atrial fibrillation. A diagnosis of Hypervolemia, unspecified hypervolemia type was also pertinent to this visit.    Disposition:   Disposition: Admitted                        Tita Cervantes MD  02/22/18  [id number]     left retinal detachment

## 2024-09-10 NOTE — SUBJECTIVE & OBJECTIVE
Interval History: Pt feels well this AM. Overall improved. HR has been 50's. Pt walked in the [place] with nursing. HR improved to the 70-80s. Pt pt did have some mild SOB with ambulation but overall pt reports feels close to baseline. Likely DC in the AM.    Review of Systems   Constitutional: Negative for activity change, appetite change, chills, fever and unexpected weight change.   HENT: Negative for rhinorrhea and sore throat.    Eyes: Negative for pain and visual disturbance.   Respiratory: Positive for shortness of breath. Negative for cough.    Cardiovascular: Negative for chest pain and palpitations.   Gastrointestinal: Negative for abdominal pain, diarrhea and nausea.   Genitourinary: Negative for dysuria, flank pain, hematuria, urgency, vaginal discharge and vaginal pain.   Musculoskeletal: Negative for gait problem and neck stiffness.   Skin: Negative for pallor and rash.   Neurological: Negative for syncope, light-headedness and headaches.     Objective:     Vital Signs (Most Recent):  Temp: 98.1 °F (36.7 °C) (02/24/18 2021)  Pulse: (!) 56 (02/24/18 2021)  Resp: 16 (02/24/18 2010)  BP: (!) 144/65 (02/24/18 2021)  SpO2: 98 % (02/24/18 2021) Vital Signs (24h Range):  Temp:  [97.7 °F (36.5 °C)-98.4 °F (36.9 °C)] 98.1 °F (36.7 °C)  Pulse:  [44-67] 56  Resp:  [16-18] 16  SpO2:  [86 %-98 %] 98 %  BP: (119-168)/(65-81) 144/65     Weight: 74.1 kg (163 lb 5.8 oz)  Body mass index is 29.88 kg/m².    Intake/Output Summary (Last 24 hours) at 02/24/18 2140  Last data filed at 02/24/18 1400   Gross per 24 hour   Intake              420 ml   Output              650 ml   Net             -230 ml      Physical Exam   Constitutional: She is oriented to person, place, and time. She appears well-developed and well-nourished.   Cardiovascular: Normal rate, regular rhythm and normal heart sounds.  Exam reveals no gallop and no friction rub.    No murmur heard.  Pulmonary/Chest: Effort normal and breath sounds normal. No  respiratory distress. She has no wheezes. She has no rales.   Abdominal: Soft. Bowel sounds are normal. She exhibits no distension. There is no tenderness.   Musculoskeletal: Normal range of motion. She exhibits edema (trace ).   Neurological: She is alert and oriented to person, place, and time. She displays normal reflexes. No cranial nerve deficit. Coordination normal.   Skin: Skin is warm and dry.       Significant Labs:   BMP:     Recent Labs  Lab 02/24/18  0444   GLU 98      K 4.2      CO2 24   BUN 32*   CREATININE 1.3   CALCIUM 8.3*   MG 1.5*     CBC:     Recent Labs  Lab 02/23/18  0403 02/24/18  0444   WBC 8.63 10.44   HGB 10.3* 10.9*   HCT 31.2* 32.3*    293     CMP:     Recent Labs  Lab 02/23/18  0403 02/23/18  1723 02/24/18  0444    137 136   K 3.8 3.6 4.2    103 104   CO2 23 24 24    112* 98   BUN 32* 29* 32*   CREATININE 1.1 1.1 1.3   CALCIUM 8.6* 9.1 8.3*   PROT 5.6*  --  5.8*   ALBUMIN 2.8*  --  2.9*   BILITOT 0.7  --  0.7   ALKPHOS 99  --  98   AST 40  --  30   ALT 56*  --  51*   ANIONGAP 11 10 8   EGFRNONAA 44.9* 44.9* 36.7*       Significant Imaging: I have reviewed and interpreted all pertinent imaging results/findings within the past 24 hours.   No